# Patient Record
Sex: MALE | Race: BLACK OR AFRICAN AMERICAN | Employment: FULL TIME | URBAN - METROPOLITAN AREA
[De-identification: names, ages, dates, MRNs, and addresses within clinical notes are randomized per-mention and may not be internally consistent; named-entity substitution may affect disease eponyms.]

---

## 2018-05-29 ENCOUNTER — OFFICE VISIT (OUTPATIENT)
Dept: FAMILY MEDICINE CLINIC | Age: 48
End: 2018-05-29
Payer: MEDICARE

## 2018-05-29 VITALS
OXYGEN SATURATION: 98 % | TEMPERATURE: 98.2 F | SYSTOLIC BLOOD PRESSURE: 122 MMHG | HEART RATE: 78 BPM | HEIGHT: 70 IN | DIASTOLIC BLOOD PRESSURE: 80 MMHG | BODY MASS INDEX: 29.73 KG/M2 | WEIGHT: 207.7 LBS

## 2018-05-29 DIAGNOSIS — Z00.00 PREVENTATIVE HEALTH CARE: ICD-10-CM

## 2018-05-29 DIAGNOSIS — Z23 NEED FOR PROPHYLACTIC VACCINATION AND INOCULATION AGAINST VARICELLA: ICD-10-CM

## 2018-05-29 DIAGNOSIS — Z83.3 FAMILY HISTORY OF DIABETES MELLITUS: ICD-10-CM

## 2018-05-29 DIAGNOSIS — Z12.11 SCREENING FOR COLON CANCER: ICD-10-CM

## 2018-05-29 DIAGNOSIS — Z13.220 SCREENING FOR HYPERLIPIDEMIA: ICD-10-CM

## 2018-05-29 DIAGNOSIS — F31.9 BIPOLAR 1 DISORDER (HCC): Primary | ICD-10-CM

## 2018-05-29 DIAGNOSIS — Z87.891 FORMER SMOKER: ICD-10-CM

## 2018-05-29 DIAGNOSIS — I10 ESSENTIAL HYPERTENSION: ICD-10-CM

## 2018-05-29 DIAGNOSIS — Z23 NEED FOR PROPHYLACTIC VACCINATION AGAINST DIPHTHERIA-TETANUS-PERTUSSIS (DTP): ICD-10-CM

## 2018-05-29 PROCEDURE — 90715 TDAP VACCINE 7 YRS/> IM: CPT | Performed by: NURSE PRACTITIONER

## 2018-05-29 PROCEDURE — 99204 OFFICE O/P NEW MOD 45 MIN: CPT | Performed by: NURSE PRACTITIONER

## 2018-05-29 PROCEDURE — 90471 IMMUNIZATION ADMIN: CPT | Performed by: NURSE PRACTITIONER

## 2018-05-29 RX ORDER — LISINOPRIL 5 MG/1
5 TABLET ORAL DAILY
COMMUNITY
End: 2018-05-29 | Stop reason: SDUPTHER

## 2018-05-29 RX ORDER — HYDROCHLOROTHIAZIDE 12.5 MG/1
12.5 TABLET ORAL DAILY
COMMUNITY
End: 2018-05-29 | Stop reason: SDUPTHER

## 2018-05-29 RX ORDER — LISINOPRIL 5 MG/1
5 TABLET ORAL DAILY
Qty: 90 TABLET | Refills: 0 | Status: SHIPPED | OUTPATIENT
Start: 2018-05-29 | End: 2018-06-29 | Stop reason: SDUPTHER

## 2018-05-29 RX ORDER — LABETALOL 100 MG/1
100 TABLET, FILM COATED ORAL 2 TIMES DAILY
Qty: 180 TABLET | Refills: 0 | Status: SHIPPED | OUTPATIENT
Start: 2018-05-29 | End: 2018-06-29 | Stop reason: SDUPTHER

## 2018-05-29 RX ORDER — HYDROCHLOROTHIAZIDE 12.5 MG/1
12.5 TABLET ORAL DAILY
Qty: 90 TABLET | Refills: 0 | Status: SHIPPED | OUTPATIENT
Start: 2018-05-29 | End: 2018-06-29 | Stop reason: SDUPTHER

## 2018-05-29 RX ORDER — LABETALOL 100 MG/1
100 TABLET, FILM COATED ORAL 2 TIMES DAILY
COMMUNITY
End: 2018-05-29 | Stop reason: SDUPTHER

## 2018-05-29 ASSESSMENT — ENCOUNTER SYMPTOMS
ABDOMINAL PAIN: 0
SHORTNESS OF BREATH: 0
BLOOD IN STOOL: 0
COUGH: 0
EYE DISCHARGE: 0

## 2018-05-29 ASSESSMENT — PATIENT HEALTH QUESTIONNAIRE - PHQ9
SUM OF ALL RESPONSES TO PHQ9 QUESTIONS 1 & 2: 0
1. LITTLE INTEREST OR PLEASURE IN DOING THINGS: 0
SUM OF ALL RESPONSES TO PHQ QUESTIONS 1-9: 0
2. FEELING DOWN, DEPRESSED OR HOPELESS: 0

## 2018-06-04 ENCOUNTER — HOSPITAL ENCOUNTER (OUTPATIENT)
Age: 48
Setting detail: SPECIMEN
Discharge: HOME | End: 2018-06-04
Payer: MEDICARE

## 2018-06-04 DIAGNOSIS — Z83.3 FAMILY HISTORY OF DIABETES MELLITUS: ICD-10-CM

## 2018-06-04 DIAGNOSIS — Z00.00 PREVENTATIVE HEALTH CARE: ICD-10-CM

## 2018-06-04 LAB
ALBUMIN SERPL-MCNC: 4.2 G/DL (ref 3.5–5.2)
ALBUMIN/GLOBULIN RATIO: 1.4 (ref 1–2.5)
ALP BLD-CCNC: 55 U/L (ref 40–129)
ALT SERPL-CCNC: 16 U/L (ref 5–41)
ANION GAP SERPL CALCULATED.3IONS-SCNC: 9 MMOL/L (ref 9–17)
AST SERPL-CCNC: 22 U/L
BILIRUB SERPL-MCNC: 0.66 MG/DL (ref 0.3–1.2)
BUN BLDV-MCNC: 13 MG/DL (ref 6–20)
BUN/CREAT BLD: ABNORMAL (ref 9–20)
CALCIUM SERPL-MCNC: 8.8 MG/DL (ref 8.6–10.4)
CHLORIDE BLD-SCNC: 103 MMOL/L (ref 98–107)
CHOLESTEROL/HDL RATIO: 3
CHOLESTEROL: 190 MG/DL
CO2: 30 MMOL/L (ref 20–31)
CREAT SERPL-MCNC: 1.08 MG/DL (ref 0.7–1.2)
ESTIMATED AVERAGE GLUCOSE: 108 MG/DL
GFR AFRICAN AMERICAN: >60 ML/MIN
GFR NON-AFRICAN AMERICAN: >60 ML/MIN
GFR SERPL CREATININE-BSD FRML MDRD: ABNORMAL ML/MIN/{1.73_M2}
GFR SERPL CREATININE-BSD FRML MDRD: ABNORMAL ML/MIN/{1.73_M2}
GLUCOSE BLD-MCNC: 101 MG/DL (ref 70–99)
HBA1C MFR BLD: 5.4 % (ref 4–6)
HCT VFR BLD CALC: 42.5 % (ref 40.7–50.3)
HDLC SERPL-MCNC: 63 MG/DL
HEMOGLOBIN: 13.8 G/DL (ref 13–17)
LDL CHOLESTEROL: 116 MG/DL (ref 0–130)
MCH RBC QN AUTO: 27.8 PG (ref 25.2–33.5)
MCHC RBC AUTO-ENTMCNC: 32.5 G/DL (ref 28.4–34.8)
MCV RBC AUTO: 85.7 FL (ref 82.6–102.9)
NRBC AUTOMATED: 0 PER 100 WBC
PDW BLD-RTO: 13.1 % (ref 11.8–14.4)
PLATELET # BLD: 187 K/UL (ref 138–453)
PMV BLD AUTO: 10 FL (ref 8.1–13.5)
POTASSIUM SERPL-SCNC: 4.3 MMOL/L (ref 3.7–5.3)
RBC # BLD: 4.96 M/UL (ref 4.21–5.77)
SODIUM BLD-SCNC: 142 MMOL/L (ref 135–144)
TOTAL PROTEIN: 7.2 G/DL (ref 6.4–8.3)
TRIGL SERPL-MCNC: 54 MG/DL
VLDLC SERPL CALC-MCNC: NORMAL MG/DL (ref 1–30)
WBC # BLD: 3.6 K/UL (ref 3.5–11.3)

## 2018-06-29 ENCOUNTER — OFFICE VISIT (OUTPATIENT)
Dept: FAMILY MEDICINE CLINIC | Age: 48
End: 2018-06-29
Payer: MEDICARE

## 2018-06-29 VITALS
DIASTOLIC BLOOD PRESSURE: 74 MMHG | BODY MASS INDEX: 30.12 KG/M2 | TEMPERATURE: 97.9 F | OXYGEN SATURATION: 97 % | SYSTOLIC BLOOD PRESSURE: 120 MMHG | HEART RATE: 70 BPM | HEIGHT: 70 IN | WEIGHT: 210.4 LBS

## 2018-06-29 DIAGNOSIS — Z12.11 SCREENING FOR COLON CANCER: ICD-10-CM

## 2018-06-29 DIAGNOSIS — Z23 NEED FOR PROPHYLACTIC VACCINATION AND INOCULATION AGAINST VARICELLA: ICD-10-CM

## 2018-06-29 DIAGNOSIS — F31.9 BIPOLAR 1 DISORDER (HCC): ICD-10-CM

## 2018-06-29 DIAGNOSIS — Z83.3 FAMILY HISTORY OF DIABETES MELLITUS: ICD-10-CM

## 2018-06-29 DIAGNOSIS — I10 ESSENTIAL HYPERTENSION: Primary | ICD-10-CM

## 2018-06-29 PROCEDURE — G8427 DOCREV CUR MEDS BY ELIG CLIN: HCPCS | Performed by: NURSE PRACTITIONER

## 2018-06-29 PROCEDURE — 3017F COLORECTAL CA SCREEN DOC REV: CPT | Performed by: NURSE PRACTITIONER

## 2018-06-29 PROCEDURE — 1036F TOBACCO NON-USER: CPT | Performed by: NURSE PRACTITIONER

## 2018-06-29 PROCEDURE — 99214 OFFICE O/P EST MOD 30 MIN: CPT | Performed by: NURSE PRACTITIONER

## 2018-06-29 PROCEDURE — G8419 CALC BMI OUT NRM PARAM NOF/U: HCPCS | Performed by: NURSE PRACTITIONER

## 2018-06-29 RX ORDER — ASPIRIN 81 MG/1
TABLET, DELAYED RELEASE ORAL
Refills: 0 | COMMUNITY
Start: 2018-06-01 | End: 2018-06-29

## 2018-06-29 ASSESSMENT — ENCOUNTER SYMPTOMS
EYE DISCHARGE: 0
COUGH: 0
BLOOD IN STOOL: 0
ABDOMINAL PAIN: 0
SHORTNESS OF BREATH: 0

## 2018-07-02 RX ORDER — LISINOPRIL 5 MG/1
TABLET ORAL
Qty: 90 TABLET | Refills: 0 | Status: SHIPPED | OUTPATIENT
Start: 2018-07-02 | End: 2018-12-01 | Stop reason: SDUPTHER

## 2018-07-02 RX ORDER — ASPIRIN 81 MG/1
TABLET ORAL
Qty: 60 TABLET | Refills: 0 | Status: SHIPPED | OUTPATIENT
Start: 2018-07-02 | End: 2018-09-06 | Stop reason: SDUPTHER

## 2018-07-02 RX ORDER — LABETALOL 100 MG/1
TABLET, FILM COATED ORAL
Qty: 180 TABLET | Refills: 0 | Status: SHIPPED | OUTPATIENT
Start: 2018-07-02 | End: 2018-11-26 | Stop reason: SDUPTHER

## 2018-07-02 RX ORDER — HYDROCHLOROTHIAZIDE 12.5 MG/1
TABLET ORAL
Qty: 90 TABLET | Refills: 0 | Status: SHIPPED | OUTPATIENT
Start: 2018-07-02 | End: 2018-12-01 | Stop reason: SDUPTHER

## 2018-08-27 ENCOUNTER — HOSPITAL ENCOUNTER (EMERGENCY)
Age: 48
Discharge: HOME | End: 2018-08-27
Attending: EMERGENCY MEDICINE
Payer: MEDICARE

## 2018-08-27 VITALS
TEMPERATURE: 98.9 F | OXYGEN SATURATION: 99 % | HEART RATE: 60 BPM | HEIGHT: 70 IN | SYSTOLIC BLOOD PRESSURE: 145 MMHG | BODY MASS INDEX: 29.35 KG/M2 | WEIGHT: 205 LBS | DIASTOLIC BLOOD PRESSURE: 81 MMHG | RESPIRATION RATE: 18 BRPM

## 2018-08-27 DIAGNOSIS — L02.419 AXILLARY ABSCESS: Primary | ICD-10-CM

## 2018-08-27 PROCEDURE — 99282 EMERGENCY DEPT VISIT SF MDM: CPT

## 2018-08-27 PROCEDURE — 10060 I&D ABSCESS SIMPLE/SINGLE: CPT

## 2018-08-27 PROCEDURE — 6370000000 HC RX 637 (ALT 250 FOR IP): Performed by: EMERGENCY MEDICINE

## 2018-08-27 RX ORDER — CLINDAMYCIN HYDROCHLORIDE 300 MG/1
300 CAPSULE ORAL 4 TIMES DAILY
Qty: 28 CAPSULE | Refills: 0 | Status: SHIPPED | OUTPATIENT
Start: 2018-08-27 | End: 2018-09-03

## 2018-08-27 RX ORDER — CLINDAMYCIN HYDROCHLORIDE 150 MG/1
300 CAPSULE ORAL ONCE
Status: COMPLETED | OUTPATIENT
Start: 2018-08-27 | End: 2018-08-27

## 2018-08-27 RX ORDER — LIDOCAINE HYDROCHLORIDE AND EPINEPHRINE 10; 10 MG/ML; UG/ML
20 INJECTION, SOLUTION INFILTRATION; PERINEURAL ONCE
Status: DISCONTINUED | OUTPATIENT
Start: 2018-08-27 | End: 2018-08-27 | Stop reason: HOSPADM

## 2018-08-27 RX ADMIN — CLINDAMYCIN HYDROCHLORIDE 300 MG: 150 CAPSULE ORAL at 08:41

## 2018-08-27 ASSESSMENT — ENCOUNTER SYMPTOMS
COLOR CHANGE: 1
ABDOMINAL PAIN: 0
VOMITING: 0
NAUSEA: 0
SHORTNESS OF BREATH: 0

## 2018-08-27 ASSESSMENT — PAIN SCALES - GENERAL
PAINLEVEL_OUTOF10: 3
PAINLEVEL_OUTOF10: 8

## 2018-08-27 ASSESSMENT — PAIN DESCRIPTION - ORIENTATION: ORIENTATION: RIGHT

## 2018-08-27 ASSESSMENT — PAIN DESCRIPTION - LOCATION: LOCATION: ARM

## 2018-08-27 NOTE — ED PROVIDER NOTES
reviewed. DIFFERENTIAL DIAGNOSIS/MDM:   53-year-old male presents with an abscess to the right axilla. Mild overlying erythema. Will need drainage. He is afebrile and nontoxic. Vitals and within normal limits. No complaints otherwise. With the overlying erythema and the location of the abscess will start patient on clindamycin. Patient was advised to return to his PCPs office. Also will give patient follow-up information to [facility] surgery clinic as this may be a recurring issue. Advised to return if he develops a fever or any other symptoms. Patient's agreeable with plan will be discharged home today. DIAGNOSTIC RESULTS     EKG: All EKG's are interpreted by the Emergency Department Physician who either signs or Co-signs this chart in the absence of a cardiologist.        RADIOLOGY:   I directly visualized the following  images and reviewed the radiologist interpretations:  No orders to display           ED BEDSIDE ULTRASOUND:      LABS:  Labs Reviewed - No data to display      EMERGENCY DEPARTMENT COURSE:   Vitals:    Vitals:    08/27/18 0826   BP: (!) 145/81   Pulse: 60   Resp: 18   Temp: 98.9 °F (37.2 °C)   TempSrc: Oral   SpO2: 99%   Weight: 205 lb (93 kg)   Height: 5' 10\" (1.778 m)              CRITICAL CARE:      CONSULTS:  None      PROCEDURES:  Incision and Drainage Procedure Note    Indication: axillary abscess    Procedure: The patient was positioned appropriately and the skin over the incision site was prepped with alcohol. Local anesthesia was obtained by infiltration using 1% Lidocaine with epinephrine. An incision was then made over the apex of the lesion and approximately 4 cc of thick, purulent and bloody material was expressed. Loculations were not present. The drainage cavity was then irrigated. The patients tetanus status was updated with a tetanus booster. The patient tolerated the procedure well. Complications: None          FINAL IMPRESSION      1.  Axillary abscess

## 2018-09-06 ENCOUNTER — OFFICE VISIT (OUTPATIENT)
Dept: FAMILY MEDICINE CLINIC | Age: 48
End: 2018-09-06
Payer: COMMERCIAL

## 2018-09-06 VITALS
OXYGEN SATURATION: 98 % | HEIGHT: 70 IN | DIASTOLIC BLOOD PRESSURE: 71 MMHG | HEART RATE: 71 BPM | SYSTOLIC BLOOD PRESSURE: 112 MMHG | BODY MASS INDEX: 29.32 KG/M2 | WEIGHT: 204.8 LBS

## 2018-09-06 DIAGNOSIS — Z23 ENCOUNTER FOR IMMUNIZATION: ICD-10-CM

## 2018-09-06 DIAGNOSIS — Z12.11 SCREENING FOR COLON CANCER: ICD-10-CM

## 2018-09-06 DIAGNOSIS — L02.419 AXILLARY ABSCESS: Primary | ICD-10-CM

## 2018-09-06 DIAGNOSIS — Z23 NEED FOR PROPHYLACTIC VACCINATION AND INOCULATION AGAINST VARICELLA: ICD-10-CM

## 2018-09-06 DIAGNOSIS — Z00.00 PREVENTATIVE HEALTH CARE: ICD-10-CM

## 2018-09-06 DIAGNOSIS — I10 ESSENTIAL HYPERTENSION: ICD-10-CM

## 2018-09-06 PROCEDURE — 99214 OFFICE O/P EST MOD 30 MIN: CPT | Performed by: NURSE PRACTITIONER

## 2018-09-06 RX ORDER — ARIPIPRAZOLE 10 MG/1
10 TABLET ORAL DAILY
COMMUNITY
End: 2019-02-18 | Stop reason: CLARIF

## 2018-09-06 RX ORDER — ASPIRIN 81 MG/1
TABLET ORAL
Qty: 90 TABLET | Refills: 1 | Status: SHIPPED | OUTPATIENT
Start: 2018-09-06 | End: 2021-03-01 | Stop reason: SDUPTHER

## 2018-09-06 ASSESSMENT — ENCOUNTER SYMPTOMS
SHORTNESS OF BREATH: 0
ABDOMINAL PAIN: 0
EYE DISCHARGE: 0
COUGH: 0
BLOOD IN STOOL: 0

## 2018-09-06 ASSESSMENT — PATIENT HEALTH QUESTIONNAIRE - PHQ9
SUM OF ALL RESPONSES TO PHQ QUESTIONS 1-9: 0
2. FEELING DOWN, DEPRESSED OR HOPELESS: 0
1. LITTLE INTEREST OR PLEASURE IN DOING THINGS: 0
SUM OF ALL RESPONSES TO PHQ QUESTIONS 1-9: 0
SUM OF ALL RESPONSES TO PHQ9 QUESTIONS 1 & 2: 0

## 2018-09-06 NOTE — PROGRESS NOTES
Visit Information    Have you changed or started any medications since your last visit including any over-the-counter medicines, vitamins, or herbal medicines? no   Are you having any side effects from any of your medications? -  no  Have you stopped taking any of your medications? Is so, why? -  no    Have you seen any other physician or provider since your last visit? No  Have you had any other diagnostic tests since your last visit? No  Have you been seen in the emergency room and/or had an admission to a hospital since we last saw you? Yes - Records Obtained  Have you had your routine dental cleaning in the past 6 months? yes -     Have you activated your [employer] account? If not, what are your barriers?  Yes     Patient Care Team:  HARRY Acosta - CNP as PCP - General (Certified Nurse Practitioner)    Medical History Review  Past Medical, Family, and Social History reviewed and does contribute to the patient presenting condition    Health Maintenance   Topic Date Due    HIV screen  03/21/1983    Shingles Vaccine (1 of 2 - 2 Dose Series) 03/21/2018    Colon cancer screen colonoscopy  03/21/2018    Flu vaccine (1) 09/01/2018    Potassium monitoring  06/04/2019    Creatinine monitoring  06/04/2019    Diabetes screen  06/04/2021    Lipid screen  06/04/2023    DTaP/Tdap/Td vaccine (2 - Td) 05/29/2028

## 2018-09-06 NOTE — PROGRESS NOTES
[address] [facility] [address] [facility]  [address] [address] [facility] [address]  [address] [facility] [address]  Dept: [phone]  Dept Fax: [phone]    Mag Earl is a 46 y.o. male who presents today for his medical conditions/complaints as noted below. Mag Earl is c/o of Follow-Up from Hospital (axillary abcess)        HPI:     Patient presents with: Follow-Up from Hospital: axillary abscess      Given clindamycin, took and feels it is clearing up    Recent cyst x 2, within 5 mos. Last 2011  Got them in groin when in [employer]. Was referred to:  [address] [address] [phone]  Did not make appt. Pt is worried this is assoc with sexual activity. Seeing zepf on abilify and zoloft         Past Medical History:   Diagnosis Date    Bipolar 1 disorder ([address]     Depression     prev on zoloft , abilify     Hypertension       Past Surgical History:   Procedure Laterality Date    BICEPS TENDON REPAIR Left        Family History   Problem Relation Age of Onset    Hypertension Mother     Heart Disease Mother     Diabetes Mother     High Cholesterol Mother     Cancer Father         leukemia    Hypertension Sister         Sisters are twins    Hypertension Brother     Heart Disease Brother        Social History   Substance Use Topics    Smoking status: Former Smoker     Packs/day: 0.50     Years: 15.00     Types: Cigarettes    Smokeless tobacco: Never Used      Comment: quit 2015    Alcohol use No      Comment: none since 2010      Current Outpatient Prescriptions   Medication Sig Dispense Refill    zoster recombinant adjuvanted vaccine (SHINGRIX) 50 MCG SUSR injection Inject 0.5 mLs into the muscle once for 1 dose 50 MCG IM then repeat 2-6 months.  1 each 1    aspirin 81 MG EC tablet TAKE 1 TABLET BY MOUTH ONE TIME A DAY 90 tablet 1    ARIPiprazole (ABILIFY) 10 MG tablet Take 10 mg by mouth daily      sertraline (ZOLOFT) 50 MG tablet Take 50 mg by mouth daily      hydrochlorothiazide (HYDRODIURIL) 12.5 MG tablet TAKE 1 TABLET BY MOUTH ONE TIME A DAY 90 tablet 0    labetalol (NORMODYNE) 100 MG tablet TAKE 1 TABLET BY MOUTH TWO TIMES A  tablet 0    lisinopril (PRINIVIL;ZESTRIL) 5 MG tablet TAKE 1 TABLET BY MOUTH ONE TIME A DAY 90 tablet 0     No current facility-administered medications for this visit. Allergies   Allergen Reactions    Nsaids     Pcn [Penicillins] Swelling     Swelling,hives, itching and shortness of breath         Subjective:      Review of Systems   Constitutional: Negative for activity change, chills, fatigue and fever. Eyes: Negative for discharge and visual disturbance. Respiratory: Negative for cough and shortness of breath. Cardiovascular: Negative for chest pain and leg swelling. Gastrointestinal: Negative for abdominal pain and blood in stool. Endocrine: Negative for cold intolerance and heat intolerance. Genitourinary: Negative for dysuria and flank pain. Musculoskeletal: Negative for joint swelling and myalgias. Skin: Negative for pallor and rash. Small lump in R axilla, not TTP   Neurological: Negative for dizziness and headaches. Psychiatric/Behavioral: Negative for hallucinations and suicidal ideas. Objective:     Physical Exam   Constitutional: He is oriented to person, place, and time. He appears well-developed and well-nourished. /71   Pulse 71   Ht 5' 10\" (1.778 m)   Wt 204 lb 12.8 oz (92.9 kg)   SpO2 98% Comment: resting @ RA  BMI 29.39 kg/m²      HENT:   Head: Normocephalic and atraumatic. Eyes: Conjunctivae and EOM are normal. No scleral icterus. Neck: Neck supple. Cardiovascular: Normal rate, regular rhythm, normal heart sounds and intact distal pulses. Pulmonary/Chest: Effort normal and breath sounds normal. He has no wheezes. He has no rales. Abdominal: Soft.  Bowel sounds are normal.   Musculoskeletal: Normal range of motion. He exhibits no edema. Neurological: He is alert and oriented to person, place, and time. Skin: Skin is warm and dry. Psychiatric: He has a normal mood and affect. Nursing note and vitals reviewed. /71   Pulse 71   Ht 5' 10\" (1.778 m)   Wt 204 lb 12.8 oz (92.9 kg)   SpO2 98% Comment: resting @ RA  BMI 29.39 kg/m²     CBC:   Lab Results   Component Value Date    WBC 3.6 06/04/2018    RBC 4.96 06/04/2018    HGB 13.8 06/04/2018    HCT 42.5 06/04/2018    MCV 85.7 06/04/2018    MCH 27.8 06/04/2018    MCHC 32.5 06/04/2018    RDW 13.1 06/04/2018     06/04/2018    MPV 10.0 06/04/2018     CMP:    Lab Results   Component Value Date     06/04/2018    K 4.3 06/04/2018     06/04/2018    CO2 30 06/04/2018    BUN 13 06/04/2018    CREATININE 1.08 06/04/2018    GFRAA >60 06/04/2018    LABGLOM >60 06/04/2018    GLUCOSE 101 06/04/2018    PROT 7.2 06/04/2018    LABALBU 4.2 06/04/2018    CALCIUM 8.8 06/04/2018    BILITOT 0.66 06/04/2018    ALKPHOS 55 06/04/2018    AST 22 06/04/2018    ALT 16 06/04/2018     Lab Results   Component Value Date    LABA1C 5.4 06/04/2018           Assessment:       Diagnosis Orders   1. Axillary abscess     2. Essential hypertension  aspirin 81 MG EC tablet   3. Body mass index (BMI) of 25.0 to 25.9 in adult     4. Need for prophylactic vaccination and inoculation against varicella  zoster recombinant adjuvanted vaccine (SHINGRIX) 50 MCG SUSR injection   5. Screening for colon cancer  POCT FECAL IMMUNOCHEMICAL TEST (FIT)   6. Preventative health care  HIV-1 And HIV-2 Antibodies    INFLUENZA, QUADV, 6-35 MO, IM, MDV, 0.25ML (FLUZONE QUADV)   7. Encounter for immunization   INFLUENZA, QUADV, 6-35 MO, IM, MDV, 0.25ML ([facility])       Plan:       Diagnosis Orders   1. Axillary abscess  Improved with clindamycin, watns to [facility] derm if recurs. advised not sex activity related and pt is relieved. 2. Essential hypertension  aspirin 81 MG EC tablet. Stable. Continue  current therapy, on acei , renal chem good      3. Body mass index (BMI) of 25.0 to 25.9 in adult  The patient is asked to make an attempt to improve diet and exercise patterns to aid in medical management of this problem. 4. Need for prophylactic vaccination and inoculation against varicella  zoster recombinant adjuvanted vaccine (SHINGRIX) 50 MCG SUSR injection   5. Screening for colon cancer  POCT FECAL IMMUNOCHEMICAL TEST (FIT)   6. Preventative health care  HIV-1 And HIV-2 Antibodies    INFLUENZA, QUADV, 6-35 MO, IM, MDV, 0.25ML ([facility])   7. Encounter for immunization   INFLUENZA, Deri Bolster, 6-35 MO, IM, MDV, 0.25ML ([facility])       Michael Aguero received counseling on the following healthy behaviors: medication adherence  Reviewed prior labs and health maintenance  Continue current medications, diet and exercise. Discussed use, benefit, and side effects of prescribed medications. Barriers to medication compliance addressed. Patient given educational materials - see patient instructions  Was a self-tracking handout given in paper form or via [email or web address]? Yes    Requested Prescriptions     Signed Prescriptions Disp Refills    zoster recombinant adjuvanted vaccine (SHINGRIX) 50 MCG SUSR injection 1 each 1     Sig: Inject 0.5 mLs into the muscle once for 1 dose 50 MCG IM then repeat 2-6 months.  aspirin 81 MG EC tablet 90 tablet 1     Sig: TAKE 1 TABLET BY MOUTH ONE TIME A DAY       All patient questions answered. Patient voiced understanding. Quality Measures    Body mass index is 29.39 kg/m². Elevated. Weight control planned discussed Healthy diet and regular exercise. BP: 112/71 Blood pressure is normal. Treatment plan consists of No treatment change needed.     Lab Results   Component Value Date    LDLCHOLESTEROL 116 06/04/2018    (goal LDL reduction with dx if diabetes is 50% LDL reduction)      PHQ Scores 9/6/2018 5/29/2018   PHQ2 Score 0 0   PHQ9 Score 0 0     Interpretation of Total Score Depression Severity: 1-4 = Minimal depression, 5-9 = Mild depression, 10-14 = Moderate depression, 15-19 = Moderately severe depression, 20-27 = Severe depression    Return in about 3 months (around 12/6/2018) for HTN. Orders Placed This Encounter   Procedures    INFLUENZA, QUADV, 6-35 MO, IM, MDV, 0.25ML (FLUZONE QUADV)    HIV-1 And HIV-2 Antibodies     Standing Status:   Future     Standing Expiration Date:   9/6/2019    POCT FECAL IMMUNOCHEMICAL TEST (FIT)     Standing Status:   Future     Standing Expiration Date:   9/6/2019     Orders Placed This Encounter   Medications    zoster recombinant adjuvanted vaccine (SHINGRIX) 50 MCG SUSR injection     Sig: Inject 0.5 mLs into the muscle once for 1 dose 50 MCG IM then repeat 2-6 months. Dispense:  1 each     Refill:  1    aspirin 81 MG EC tablet     Sig: TAKE 1 TABLET BY MOUTH ONE TIME A DAY     Dispense:  90 tablet     Refill:  1       Patient given verbal and/or written educational instructions. Follow up as directed. I have reviewed and agree with the nursing documentation.     Electronically signed by HARRY Triplett CNP on 9/6/2018 at 9:02 AM

## 2018-09-07 ENCOUNTER — HOSPITAL ENCOUNTER (OUTPATIENT)
Age: 48
Discharge: HOME | End: 2018-09-07
Payer: COMMERCIAL

## 2018-09-07 DIAGNOSIS — Z00.00 PREVENTATIVE HEALTH CARE: ICD-10-CM

## 2018-09-07 DIAGNOSIS — Z12.11 SCREENING FOR COLON CANCER: ICD-10-CM

## 2018-09-07 LAB
ABSOLUTE BANDS #: 0.03 K/UL (ref 0–1)
ABSOLUTE EOS #: 0.06 K/UL (ref 0–0.4)
ABSOLUTE IMMATURE GRANULOCYTE: ABNORMAL K/UL (ref 0–0.3)
ABSOLUTE LYMPH #: 0.83 K/UL (ref 1–4.8)
ABSOLUTE MONO #: 0.29 K/UL (ref 0.1–1.3)
ALBUMIN SERPL-MCNC: 4.3 G/DL (ref 3.5–5.2)
ALBUMIN/GLOBULIN RATIO: ABNORMAL (ref 1–2.5)
ALP BLD-CCNC: 55 U/L (ref 40–129)
ALT SERPL-CCNC: 12 U/L (ref 5–41)
ANION GAP SERPL CALCULATED.3IONS-SCNC: 10 MMOL/L (ref 9–17)
AST SERPL-CCNC: 20 U/L
ATYPICAL LYMPHOCYTE ABSOLUTE COUNT: 0.06 K/UL
ATYPICAL LYMPHOCYTES: 2 %
BANDS: 1 % (ref 0–10)
BASOPHILS # BLD: 0 % (ref 0–2)
BASOPHILS ABSOLUTE: 0 K/UL (ref 0–0.2)
BILIRUB SERPL-MCNC: 0.51 MG/DL (ref 0.3–1.2)
BUN BLDV-MCNC: 14 MG/DL (ref 6–20)
BUN/CREAT BLD: ABNORMAL (ref 9–20)
CALCIUM SERPL-MCNC: 9 MG/DL (ref 8.6–10.4)
CHLORIDE BLD-SCNC: 101 MMOL/L (ref 98–107)
CHOLESTEROL/HDL RATIO: 3.4
CHOLESTEROL: 185 MG/DL
CO2: 28 MMOL/L (ref 20–31)
CONTROL: PRESENT
CREAT SERPL-MCNC: 1.31 MG/DL (ref 0.7–1.2)
DIFFERENTIAL TYPE: ABNORMAL
EOSINOPHILS RELATIVE PERCENT: 2 % (ref 0–4)
GFR AFRICAN AMERICAN: >60 ML/MIN
GFR NON-AFRICAN AMERICAN: 58 ML/MIN
GFR SERPL CREATININE-BSD FRML MDRD: ABNORMAL ML/MIN/{1.73_M2}
GFR SERPL CREATININE-BSD FRML MDRD: ABNORMAL ML/MIN/{1.73_M2}
GLUCOSE BLD-MCNC: 103 MG/DL (ref 70–99)
HCT VFR BLD CALC: 41.5 % (ref 41–53)
HDLC SERPL-MCNC: 55 MG/DL
HEMOCCULT STL QL: NEGATIVE
HEMOGLOBIN: 14.3 G/DL (ref 13.5–17.5)
HIV AG/AB: NONREACTIVE
IMMATURE GRANULOCYTES: ABNORMAL %
LDL CHOLESTEROL: 118 MG/DL (ref 0–130)
LYMPHOCYTES # BLD: 26 % (ref 24–44)
MCH RBC QN AUTO: 29 PG (ref 26–34)
MCHC RBC AUTO-ENTMCNC: 34.5 G/DL (ref 31–37)
MCV RBC AUTO: 84.1 FL (ref 80–100)
MONOCYTES # BLD: 9 % (ref 1–7)
MORPHOLOGY: NORMAL
NRBC AUTOMATED: ABNORMAL PER 100 WBC
PDW BLD-RTO: 13.3 % (ref 11.5–14.9)
PLATELET # BLD: 219 K/UL (ref 150–450)
PLATELET ESTIMATE: ABNORMAL
PMV BLD AUTO: 7 FL (ref 6–12)
POTASSIUM SERPL-SCNC: 3.8 MMOL/L (ref 3.7–5.3)
RBC # BLD: 4.94 M/UL (ref 4.5–5.9)
RBC # BLD: ABNORMAL 10*6/UL
SEG NEUTROPHILS: 60 % (ref 36–66)
SEGMENTED NEUTROPHILS ABSOLUTE COUNT: 1.93 K/UL (ref 1.3–9.1)
SODIUM BLD-SCNC: 139 MMOL/L (ref 135–144)
T3 TOTAL: 118 NG/DL (ref 80–200)
T4 TOTAL: 7.7 UG/DL (ref 4.5–12)
TOTAL PROTEIN: 7.2 G/DL (ref 6.4–8.3)
TRIGL SERPL-MCNC: 62 MG/DL
TSH SERPL DL<=0.05 MIU/L-ACNC: 1.2 MIU/L (ref 0.3–5)
VLDLC SERPL CALC-MCNC: NORMAL MG/DL (ref 1–30)
WBC # BLD: 3.2 K/UL (ref 3.5–11)
WBC # BLD: ABNORMAL 10*3/UL

## 2018-09-07 PROCEDURE — 82274 ASSAY TEST FOR BLOOD FECAL: CPT | Performed by: NURSE PRACTITIONER

## 2018-09-07 PROCEDURE — 87389 HIV-1 AG W/HIV-1&-2 AB AG IA: CPT

## 2018-09-07 PROCEDURE — 84443 ASSAY THYROID STIM HORMONE: CPT

## 2018-09-07 PROCEDURE — 84436 ASSAY OF TOTAL THYROXINE: CPT

## 2018-09-07 PROCEDURE — 80061 LIPID PANEL: CPT

## 2018-09-07 PROCEDURE — 84480 ASSAY TRIIODOTHYRONINE (T3): CPT

## 2018-09-07 PROCEDURE — 85025 COMPLETE CBC W/AUTO DIFF WBC: CPT

## 2018-09-07 PROCEDURE — 80053 COMPREHEN METABOLIC PANEL: CPT

## 2018-09-07 PROCEDURE — 36415 COLL VENOUS BLD VENIPUNCTURE: CPT

## 2018-09-14 PROCEDURE — G0008 ADMIN INFLUENZA VIRUS VAC: HCPCS | Performed by: NURSE PRACTITIONER

## 2018-09-14 PROCEDURE — 90686 IIV4 VACC NO PRSV 0.5 ML IM: CPT | Performed by: NURSE PRACTITIONER

## 2018-11-27 PROBLEM — N28.9 RENAL INSUFFICIENCY: Status: ACTIVE | Noted: 2018-11-27

## 2018-12-03 DIAGNOSIS — N28.9 RENAL INSUFFICIENCY: Primary | ICD-10-CM

## 2018-12-03 RX ORDER — HYDROCHLOROTHIAZIDE 12.5 MG/1
TABLET ORAL
Qty: 90 TABLET | Refills: 0 | Status: SHIPPED | OUTPATIENT
Start: 2018-12-03 | End: 2018-12-06 | Stop reason: SDUPTHER

## 2018-12-03 RX ORDER — LISINOPRIL 5 MG/1
TABLET ORAL
Qty: 90 TABLET | Refills: 0 | Status: SHIPPED | OUTPATIENT
Start: 2018-12-03 | End: 2018-12-06 | Stop reason: SDUPTHER

## 2018-12-06 ENCOUNTER — OFFICE VISIT (OUTPATIENT)
Dept: FAMILY MEDICINE CLINIC | Age: 48
End: 2018-12-06
Payer: MEDICARE

## 2018-12-06 VITALS
HEIGHT: 70 IN | DIASTOLIC BLOOD PRESSURE: 76 MMHG | SYSTOLIC BLOOD PRESSURE: 118 MMHG | WEIGHT: 211.6 LBS | HEART RATE: 72 BPM | OXYGEN SATURATION: 99 % | BODY MASS INDEX: 30.29 KG/M2

## 2018-12-06 DIAGNOSIS — F31.9 BIPOLAR 1 DISORDER (HCC): ICD-10-CM

## 2018-12-06 DIAGNOSIS — N28.9 RENAL INSUFFICIENCY: ICD-10-CM

## 2018-12-06 DIAGNOSIS — N52.9 ERECTILE DYSFUNCTION, UNSPECIFIED ERECTILE DYSFUNCTION TYPE: ICD-10-CM

## 2018-12-06 DIAGNOSIS — D72.819 LEUKOPENIA, UNSPECIFIED TYPE: ICD-10-CM

## 2018-12-06 DIAGNOSIS — I10 ESSENTIAL HYPERTENSION: Primary | ICD-10-CM

## 2018-12-06 PROCEDURE — 99214 OFFICE O/P EST MOD 30 MIN: CPT | Performed by: NURSE PRACTITIONER

## 2018-12-06 RX ORDER — LISINOPRIL 5 MG/1
TABLET ORAL
Qty: 90 TABLET | Refills: 0 | Status: SHIPPED | OUTPATIENT
Start: 2018-12-06 | End: 2019-02-18 | Stop reason: SDUPTHER

## 2018-12-06 RX ORDER — CARVEDILOL 6.25 MG/1
6.25 TABLET ORAL 2 TIMES DAILY
Qty: 60 TABLET | Refills: 3 | Status: SHIPPED | OUTPATIENT
Start: 2018-12-06 | End: 2019-02-18 | Stop reason: ALTCHOICE

## 2018-12-06 RX ORDER — HYDROCHLOROTHIAZIDE 12.5 MG/1
TABLET ORAL
Qty: 90 TABLET | Refills: 0 | Status: SHIPPED | OUTPATIENT
Start: 2018-12-06 | End: 2019-02-18 | Stop reason: SDUPTHER

## 2018-12-06 ASSESSMENT — ENCOUNTER SYMPTOMS
COUGH: 0
EYE DISCHARGE: 0
BLOOD IN STOOL: 0
ABDOMINAL PAIN: 0
SHORTNESS OF BREATH: 0

## 2018-12-06 NOTE — PROGRESS NOTES
[address] [facility] [address] [facility]  [address]  [address] [facility] [address]  Dept: [phone]  Dept Fax: [phone]    Honey Garcia is a 46 y.o. male who presents today for his medical conditions/complaintsas noted below. Honey Garcia is c/o of Hypertension and Erectile Dysfunction (thinks the medication have caused issues )        HPI:     Patient presents with:  Hypertension; Worried about meds and ED   Taking compliantly       Erectile Dysfunction: thinks the medication have caused issues   Achieving erection , not maintaining , not ejaculating. Started labetalol in 2012 ; had no issues back then , but was in long term for 6 y and not having sex.     Started few mths ago - since April   Started abilify and zoloft in sept - noticed no appreciable difference when started these             Past Medical History:   Diagnosis Date    B[address] disorder ([facility] [address]     Depression     prev on zoloft , abilify     Hypertension     Renal insufficiency 11/27/2018     Past Surgical History:   Procedure Laterality Date    BICEPS TENDON REPAIR Left        Family History   Problem Relation Age of Onset    Hypertension Mother     Heart Disease Mother     Diabetes Mother     High Cholesterol Mother     Cancer Father         leukemia    Hypertension Sister         Sisters are twins    Hypertension Brother     Heart Disease Brother        Social History   Substance Use Topics    Smoking status: Former Smoker     Packs/day: 0.50     Years: 15.00     Types: Cigarettes    Smokeless tobacco: Never Used      Comment: quit 2015    Alcohol use No      Comment: none since 2010      Current Outpatient Prescriptions   Medication Sig Dispense Refill    hydrochlorothiazide (HYDRODIURIL) 12.5 MG tablet TAKE 1 TABLET BY MOUTH ONE TIME A DAY 90 tablet 0    lisinopril (PRINIVIL;ZESTRIL) 5 MG tablet TAKE 1 TABLET BY MOUTH ONE TIME A DAY 90 tablet 0    carvedilol (COREG) 6.25 MG

## 2018-12-31 ENCOUNTER — TELEPHONE (OUTPATIENT)
Dept: FAMILY MEDICINE CLINIC | Age: 48
End: 2018-12-31

## 2018-12-31 ENCOUNTER — HOSPITAL ENCOUNTER (OUTPATIENT)
Age: 48
Discharge: HOME | End: 2018-12-31
Payer: COMMERCIAL

## 2018-12-31 DIAGNOSIS — D72.819 LEUKOPENIA, UNSPECIFIED TYPE: Primary | ICD-10-CM

## 2018-12-31 DIAGNOSIS — N28.9 RENAL INSUFFICIENCY: ICD-10-CM

## 2018-12-31 DIAGNOSIS — D72.819 LEUKOPENIA, UNSPECIFIED TYPE: ICD-10-CM

## 2018-12-31 LAB
ANION GAP SERPL CALCULATED.3IONS-SCNC: 8 MMOL/L (ref 9–17)
BUN BLDV-MCNC: 13 MG/DL (ref 6–20)
BUN/CREAT BLD: ABNORMAL (ref 9–20)
CALCIUM SERPL-MCNC: 9.1 MG/DL (ref 8.6–10.4)
CHLORIDE BLD-SCNC: 104 MMOL/L (ref 98–107)
CO2: 28 MMOL/L (ref 20–31)
CREAT SERPL-MCNC: 1.21 MG/DL (ref 0.7–1.2)
GFR AFRICAN AMERICAN: >60 ML/MIN
GFR NON-AFRICAN AMERICAN: >60 ML/MIN
GFR SERPL CREATININE-BSD FRML MDRD: ABNORMAL ML/MIN/{1.73_M2}
GFR SERPL CREATININE-BSD FRML MDRD: ABNORMAL ML/MIN/{1.73_M2}
GLUCOSE BLD-MCNC: 107 MG/DL (ref 70–99)
HCT VFR BLD CALC: 43.1 % (ref 41–53)
HEMOGLOBIN: 14.5 G/DL (ref 13.5–17.5)
MCH RBC QN AUTO: 28.4 PG (ref 26–34)
MCHC RBC AUTO-ENTMCNC: 33.6 G/DL (ref 31–37)
MCV RBC AUTO: 84.4 FL (ref 80–100)
NRBC AUTOMATED: ABNORMAL PER 100 WBC
PDW BLD-RTO: 13.6 % (ref 11.5–14.9)
PLATELET # BLD: 206 K/UL (ref 150–450)
PMV BLD AUTO: 7.3 FL (ref 6–12)
POTASSIUM SERPL-SCNC: 4.3 MMOL/L (ref 3.7–5.3)
RBC # BLD: 5.11 M/UL (ref 4.5–5.9)
SODIUM BLD-SCNC: 140 MMOL/L (ref 135–144)
WBC # BLD: 2.6 K/UL (ref 3.5–11)

## 2018-12-31 PROCEDURE — 85027 COMPLETE CBC AUTOMATED: CPT

## 2018-12-31 PROCEDURE — 80048 BASIC METABOLIC PNL TOTAL CA: CPT

## 2018-12-31 PROCEDURE — 36415 COLL VENOUS BLD VENIPUNCTURE: CPT

## 2019-01-03 ENCOUNTER — OFFICE VISIT (OUTPATIENT)
Dept: FAMILY MEDICINE CLINIC | Age: 49
End: 2019-01-03
Payer: MEDICARE

## 2019-01-03 VITALS
WEIGHT: 211.4 LBS | BODY MASS INDEX: 30.26 KG/M2 | HEIGHT: 70 IN | SYSTOLIC BLOOD PRESSURE: 115 MMHG | HEART RATE: 61 BPM | OXYGEN SATURATION: 99 % | DIASTOLIC BLOOD PRESSURE: 75 MMHG

## 2019-01-03 DIAGNOSIS — N52.9 ERECTILE DYSFUNCTION, UNSPECIFIED ERECTILE DYSFUNCTION TYPE: ICD-10-CM

## 2019-01-03 DIAGNOSIS — I10 ESSENTIAL HYPERTENSION: ICD-10-CM

## 2019-01-03 DIAGNOSIS — F31.9 BIPOLAR 1 DISORDER (HCC): Primary | ICD-10-CM

## 2019-01-03 DIAGNOSIS — D72.819 LEUKOPENIA, UNSPECIFIED TYPE: ICD-10-CM

## 2019-01-03 PROCEDURE — G8417 CALC BMI ABV UP PARAM F/U: HCPCS | Performed by: NURSE PRACTITIONER

## 2019-01-03 PROCEDURE — 1036F TOBACCO NON-USER: CPT | Performed by: NURSE PRACTITIONER

## 2019-01-03 PROCEDURE — 99214 OFFICE O/P EST MOD 30 MIN: CPT | Performed by: NURSE PRACTITIONER

## 2019-01-03 PROCEDURE — 3017F COLORECTAL CA SCREEN DOC REV: CPT | Performed by: NURSE PRACTITIONER

## 2019-01-03 PROCEDURE — G8482 FLU IMMUNIZE ORDER/ADMIN: HCPCS | Performed by: NURSE PRACTITIONER

## 2019-01-03 PROCEDURE — G8427 DOCREV CUR MEDS BY ELIG CLIN: HCPCS | Performed by: NURSE PRACTITIONER

## 2019-01-03 ASSESSMENT — ENCOUNTER SYMPTOMS
BLOOD IN STOOL: 0
ABDOMINAL PAIN: 0
COUGH: 0
SHORTNESS OF BREATH: 0
EYE DISCHARGE: 0

## 2019-01-07 ENCOUNTER — TELEPHONE (OUTPATIENT)
Dept: FAMILY MEDICINE CLINIC | Age: 49
End: 2019-01-07

## 2019-01-07 DIAGNOSIS — N52.9 ERECTILE DYSFUNCTION, UNSPECIFIED ERECTILE DYSFUNCTION TYPE: Primary | ICD-10-CM

## 2019-01-07 RX ORDER — SILDENAFIL 50 MG/1
50 TABLET, FILM COATED ORAL PRN
Qty: 9 TABLET | Refills: 0 | Status: SHIPPED | OUTPATIENT
Start: 2019-01-07 | End: 2019-02-07 | Stop reason: SDUPTHER

## 2019-02-12 ENCOUNTER — HOSPITAL ENCOUNTER (OUTPATIENT)
Age: 49
Discharge: HOME | End: 2019-02-12
Payer: MEDICARE

## 2019-02-12 ENCOUNTER — INITIAL CONSULT (OUTPATIENT)
Dept: ONCOLOGY | Age: 49
End: 2019-02-12
Payer: MEDICARE

## 2019-02-12 VITALS
HEART RATE: 77 BPM | BODY MASS INDEX: 31.34 KG/M2 | DIASTOLIC BLOOD PRESSURE: 70 MMHG | HEIGHT: 70 IN | SYSTOLIC BLOOD PRESSURE: 111 MMHG | WEIGHT: 218.9 LBS | TEMPERATURE: 97.9 F

## 2019-02-12 DIAGNOSIS — R53.83 FATIGUE, UNSPECIFIED TYPE: ICD-10-CM

## 2019-02-12 DIAGNOSIS — R53.83 FATIGUE, UNSPECIFIED TYPE: Primary | ICD-10-CM

## 2019-02-12 DIAGNOSIS — R71.8 OTHER ABNORMALITY OF RED BLOOD CELLS: ICD-10-CM

## 2019-02-12 DIAGNOSIS — D72.819 LEUKOPENIA, UNSPECIFIED TYPE: ICD-10-CM

## 2019-02-12 LAB
ABSOLUTE EOS #: 0.07 K/UL (ref 0–0.44)
ABSOLUTE IMMATURE GRANULOCYTE: <0.03 K/UL (ref 0–0.3)
ABSOLUTE LYMPH #: 1.68 K/UL (ref 1.1–3.7)
ABSOLUTE MONO #: 0.44 K/UL (ref 0.1–1.2)
ABSOLUTE RETIC #: 0.07 M/UL (ref 0.03–0.08)
BASOPHILS # BLD: 1 % (ref 0–2)
BASOPHILS ABSOLUTE: <0.03 K/UL (ref 0–0.2)
DIFFERENTIAL TYPE: NORMAL
EOSINOPHILS RELATIVE PERCENT: 2 % (ref 1–4)
FOLATE: >20 NG/ML
HAV IGM SER IA-ACNC: NONREACTIVE
HCT VFR BLD CALC: 42.6 % (ref 40.7–50.3)
HEMOGLOBIN: 14.5 G/DL (ref 13–17)
HEPATITIS B CORE IGM ANTIBODY: NONREACTIVE
HEPATITIS B SURFACE ANTIGEN: NONREACTIVE
HEPATITIS C ANTIBODY: NONREACTIVE
IMMATURE GRANULOCYTES: 0 %
IMMATURE RETIC FRACT: 12.3 % (ref 2.7–18.3)
IRON SATURATION: 35 % (ref 20–55)
IRON: 91 UG/DL (ref 59–158)
LACTATE DEHYDROGENASE: 185 U/L (ref 135–225)
LYMPHOCYTES # BLD: 43 % (ref 24–43)
MCH RBC QN AUTO: 28.7 PG (ref 25.2–33.5)
MCHC RBC AUTO-ENTMCNC: 34 G/DL (ref 28.4–34.8)
MCV RBC AUTO: 84.4 FL (ref 82.6–102.9)
MONOCYTES # BLD: 11 % (ref 3–12)
NRBC AUTOMATED: 0 PER 100 WBC
PDW BLD-RTO: 13 % (ref 11.8–14.4)
PLATELET # BLD: 210 K/UL (ref 138–453)
PLATELET ESTIMATE: NORMAL
PMV BLD AUTO: 9.8 FL (ref 8.1–13.5)
RBC # BLD: 5.05 M/UL (ref 4.21–5.77)
RBC # BLD: NORMAL 10*6/UL
RETIC %: 1.4 % (ref 0.5–1.9)
RETIC HEMOGLOBIN: 34.3 PG (ref 28.2–35.7)
SEG NEUTROPHILS: 43 % (ref 36–65)
SEGMENTED NEUTROPHILS ABSOLUTE COUNT: 1.67 K/UL (ref 1.5–8.1)
TOTAL IRON BINDING CAPACITY: 260 UG/DL (ref 250–450)
UNSATURATED IRON BINDING CAPACITY: 169 UG/DL (ref 112–347)
VITAMIN B-12: 627 PG/ML (ref 232–1245)
WBC # BLD: 3.9 K/UL (ref 3.5–11.3)
WBC # BLD: NORMAL 10*3/UL

## 2019-02-12 PROCEDURE — 86038 ANTINUCLEAR ANTIBODIES: CPT

## 2019-02-12 PROCEDURE — 85025 COMPLETE CBC W/AUTO DIFF WBC: CPT

## 2019-02-12 PROCEDURE — 83540 ASSAY OF IRON: CPT

## 2019-02-12 PROCEDURE — 80074 ACUTE HEPATITIS PANEL: CPT

## 2019-02-12 PROCEDURE — G8417 CALC BMI ABV UP PARAM F/U: HCPCS | Performed by: INTERNAL MEDICINE

## 2019-02-12 PROCEDURE — 86225 DNA ANTIBODY NATIVE: CPT

## 2019-02-12 PROCEDURE — 99201 HC NEW PT, E/M LEVEL 1: CPT | Performed by: INTERNAL MEDICINE

## 2019-02-12 PROCEDURE — 82607 VITAMIN B-12: CPT

## 2019-02-12 PROCEDURE — 85045 AUTOMATED RETICULOCYTE COUNT: CPT

## 2019-02-12 PROCEDURE — 88185 FLOWCYTOMETRY/TC ADD-ON: CPT

## 2019-02-12 PROCEDURE — 83550 IRON BINDING TEST: CPT

## 2019-02-12 PROCEDURE — G8482 FLU IMMUNIZE ORDER/ADMIN: HCPCS | Performed by: INTERNAL MEDICINE

## 2019-02-12 PROCEDURE — 82746 ASSAY OF FOLIC ACID SERUM: CPT

## 2019-02-12 PROCEDURE — G8427 DOCREV CUR MEDS BY ELIG CLIN: HCPCS | Performed by: INTERNAL MEDICINE

## 2019-02-12 PROCEDURE — 86665 EPSTEIN-BARR CAPSID VCA: CPT

## 2019-02-12 PROCEDURE — 36415 COLL VENOUS BLD VENIPUNCTURE: CPT

## 2019-02-12 PROCEDURE — 99204 OFFICE O/P NEW MOD 45 MIN: CPT | Performed by: INTERNAL MEDICINE

## 2019-02-12 PROCEDURE — 86235 NUCLEAR ANTIGEN ANTIBODY: CPT

## 2019-02-12 PROCEDURE — 83615 LACTATE (LD) (LDH) ENZYME: CPT

## 2019-02-12 PROCEDURE — 86663 EPSTEIN-BARR ANTIBODY: CPT

## 2019-02-12 PROCEDURE — 86664 EPSTEIN-BARR NUCLEAR ANTIGEN: CPT

## 2019-02-12 PROCEDURE — 88184 FLOWCYTOMETRY/ TC 1 MARKER: CPT

## 2019-02-12 PROCEDURE — 3017F COLORECTAL CA SCREEN DOC REV: CPT | Performed by: INTERNAL MEDICINE

## 2019-02-13 LAB
ANA REFERENCE RANGE:: ABNORMAL
ANTI DNA DOUBLE STRANDED: 136 IU/ML
ANTI JO-1 IGG: 17 U/ML
ANTI RNP AB: 31 U/ML
ANTI SSA: 9 U/ML
ANTI SSB: 25 U/ML
ANTI-CENTROMERE: 30 U/ML
ANTI-NUCLEAR ANTIBODY (ANA): POSITIVE
ANTI-SCLERODERMA: 36 U/ML
ANTI-SMITH: 21 U/ML
HISTONE ANTIBODY: 49 U/ML

## 2019-02-14 LAB
EBV EARLY ANTIGEN IGG: 47 U/ML
EBV INTERPRETATION: ABNORMAL
EBV NUCLEAR AG AB: 403 U/ML
EPSTEIN-BARR VCA IGG: 1082 U/ML
EPSTEIN-BARR VCA IGM: 27 U/ML
SURGICAL PATHOLOGY REPORT: NORMAL

## 2019-02-15 LAB — FLOW CYTOMETRY BL: NORMAL

## 2019-02-18 ENCOUNTER — OFFICE VISIT (OUTPATIENT)
Dept: FAMILY MEDICINE CLINIC | Age: 49
End: 2019-02-18
Payer: MEDICARE

## 2019-02-18 ENCOUNTER — HOSPITAL ENCOUNTER (OUTPATIENT)
Dept: ULTRASOUND IMAGING | Age: 49
Discharge: HOME | End: 2019-02-20
Payer: MEDICARE

## 2019-02-18 VITALS
WEIGHT: 220 LBS | HEIGHT: 70 IN | DIASTOLIC BLOOD PRESSURE: 70 MMHG | OXYGEN SATURATION: 98 % | TEMPERATURE: 98.6 F | SYSTOLIC BLOOD PRESSURE: 130 MMHG | BODY MASS INDEX: 31.5 KG/M2 | HEART RATE: 70 BPM

## 2019-02-18 DIAGNOSIS — N52.9 ERECTILE DYSFUNCTION, UNSPECIFIED ERECTILE DYSFUNCTION TYPE: ICD-10-CM

## 2019-02-18 DIAGNOSIS — D72.819 LEUKOPENIA, UNSPECIFIED TYPE: ICD-10-CM

## 2019-02-18 DIAGNOSIS — N28.9 RENAL INSUFFICIENCY: ICD-10-CM

## 2019-02-18 DIAGNOSIS — E66.9 CLASS 1 OBESITY WITH SERIOUS COMORBIDITY AND BODY MASS INDEX (BMI) OF 31.0 TO 31.9 IN ADULT, UNSPECIFIED OBESITY TYPE: ICD-10-CM

## 2019-02-18 DIAGNOSIS — I10 ESSENTIAL HYPERTENSION: Primary | ICD-10-CM

## 2019-02-18 PROBLEM — F33.2 MAJOR DEPRESSIVE DISORDER, RECURRENT SEVERE WITHOUT PSYCHOTIC FEATURES (HCC): Status: ACTIVE | Noted: 2019-02-18

## 2019-02-18 PROBLEM — F31.5 BIPOLAR DISORD, CRNT EPSD DEPRESS, SEVERE, W PSYCH FEATURES (HCC): Status: ACTIVE | Noted: 2019-02-18

## 2019-02-18 PROBLEM — F33.2 MAJOR DEPRESSIVE DISORDER, RECURRENT SEVERE WITHOUT PSYCHOTIC FEATURES (HCC): Status: RESOLVED | Noted: 2019-02-18 | Resolved: 2019-02-18

## 2019-02-18 PROBLEM — Z65.2 PROBLEMS RELATED TO RELEASE FROM PRISON: Status: ACTIVE | Noted: 2019-02-18

## 2019-02-18 PROBLEM — F33.3 MAJOR DEPRESSIVE DISORDER, RECURRENT EPISODE, SEVERE, WITH PSYCHOTIC BEHAVIOR (HCC): Status: ACTIVE | Noted: 2019-02-18

## 2019-02-18 PROBLEM — E66.811 CLASS 1 OBESITY WITH SERIOUS COMORBIDITY AND BODY MASS INDEX (BMI) OF 31.0 TO 31.9 IN ADULT: Status: ACTIVE | Noted: 2019-02-18

## 2019-02-18 PROBLEM — F33.3 MAJOR DEPRESSIVE DISORDER, RECURRENT EPISODE, SEVERE, WITH PSYCHOTIC BEHAVIOR (HCC): Status: RESOLVED | Noted: 2019-02-18 | Resolved: 2019-02-18

## 2019-02-18 PROBLEM — F31.5 BIPOLAR DISORD, CRNT EPSD DEPRESS, SEVERE, W PSYCH FEATURES (HCC): Status: RESOLVED | Noted: 2019-02-18 | Resolved: 2019-02-18

## 2019-02-18 PROCEDURE — 1036F TOBACCO NON-USER: CPT | Performed by: NURSE PRACTITIONER

## 2019-02-18 PROCEDURE — G8482 FLU IMMUNIZE ORDER/ADMIN: HCPCS | Performed by: NURSE PRACTITIONER

## 2019-02-18 PROCEDURE — G8417 CALC BMI ABV UP PARAM F/U: HCPCS | Performed by: NURSE PRACTITIONER

## 2019-02-18 PROCEDURE — 76705 ECHO EXAM OF ABDOMEN: CPT

## 2019-02-18 PROCEDURE — 3017F COLORECTAL CA SCREEN DOC REV: CPT | Performed by: NURSE PRACTITIONER

## 2019-02-18 PROCEDURE — G8427 DOCREV CUR MEDS BY ELIG CLIN: HCPCS | Performed by: NURSE PRACTITIONER

## 2019-02-18 PROCEDURE — 99214 OFFICE O/P EST MOD 30 MIN: CPT | Performed by: NURSE PRACTITIONER

## 2019-02-18 RX ORDER — HYDROCHLOROTHIAZIDE 12.5 MG/1
TABLET ORAL
Qty: 90 TABLET | Refills: 0 | Status: SHIPPED | OUTPATIENT
Start: 2019-02-18 | End: 2019-03-18 | Stop reason: ALTCHOICE

## 2019-02-18 RX ORDER — SILDENAFIL 100 MG/1
TABLET, FILM COATED ORAL
Qty: 15 TABLET | Refills: 1 | Status: SHIPPED | OUTPATIENT
Start: 2019-02-18 | End: 2019-03-18 | Stop reason: SDUPTHER

## 2019-02-18 RX ORDER — LABETALOL 100 MG/1
100 TABLET, FILM COATED ORAL 2 TIMES DAILY
Qty: 180 TABLET | Refills: 1 | Status: CANCELLED | OUTPATIENT
Start: 2019-02-18

## 2019-02-18 RX ORDER — LABETALOL 100 MG/1
1 TABLET, FILM COATED ORAL 2 TIMES DAILY
COMMUNITY
End: 2019-02-18 | Stop reason: ALTCHOICE

## 2019-02-18 RX ORDER — HYDROCHLOROTHIAZIDE 12.5 MG/1
1 TABLET ORAL DAILY
COMMUNITY
End: 2019-02-18 | Stop reason: SDUPTHER

## 2019-02-18 RX ORDER — ARIPIPRAZOLE 5 MG/1
1 TABLET ORAL DAILY
COMMUNITY
Start: 2019-01-10 | End: 2019-03-18 | Stop reason: ALTCHOICE

## 2019-02-18 RX ORDER — LISINOPRIL 5 MG/1
1 TABLET ORAL DAILY
COMMUNITY
End: 2019-02-18 | Stop reason: SDUPTHER

## 2019-02-18 RX ORDER — LISINOPRIL 10 MG/1
TABLET ORAL
Qty: 90 TABLET | Refills: 1 | Status: SHIPPED | OUTPATIENT
Start: 2019-02-18 | End: 2019-06-17 | Stop reason: SDUPTHER

## 2019-02-18 RX ORDER — CARVEDILOL 6.25 MG/1
6.25 TABLET ORAL 2 TIMES DAILY
Qty: 180 TABLET | Refills: 1 | Status: CANCELLED | OUTPATIENT
Start: 2019-02-18

## 2019-02-18 ASSESSMENT — PATIENT HEALTH QUESTIONNAIRE - PHQ9
SUM OF ALL RESPONSES TO PHQ QUESTIONS 1-9: 0
2. FEELING DOWN, DEPRESSED OR HOPELESS: 0
SUM OF ALL RESPONSES TO PHQ9 QUESTIONS 1 & 2: 0
1. LITTLE INTEREST OR PLEASURE IN DOING THINGS: 0
SUM OF ALL RESPONSES TO PHQ QUESTIONS 1-9: 0

## 2019-02-18 ASSESSMENT — ENCOUNTER SYMPTOMS
COUGH: 0
GASTROINTESTINAL NEGATIVE: 1
EYES NEGATIVE: 1
WHEEZING: 0
SHORTNESS OF BREATH: 0
RESPIRATORY NEGATIVE: 1

## 2019-02-26 ENCOUNTER — TELEPHONE (OUTPATIENT)
Dept: ONCOLOGY | Age: 49
End: 2019-02-26

## 2019-02-26 ENCOUNTER — OFFICE VISIT (OUTPATIENT)
Dept: ONCOLOGY | Age: 49
End: 2019-02-26
Payer: MEDICARE

## 2019-02-26 VITALS
TEMPERATURE: 98.2 F | BODY MASS INDEX: 31.64 KG/M2 | SYSTOLIC BLOOD PRESSURE: 127 MMHG | WEIGHT: 220.5 LBS | HEART RATE: 65 BPM | RESPIRATION RATE: 16 BRPM | DIASTOLIC BLOOD PRESSURE: 78 MMHG

## 2019-02-26 DIAGNOSIS — R76.8 ANA POSITIVE: ICD-10-CM

## 2019-02-26 DIAGNOSIS — D72.819 LEUKOPENIA, UNSPECIFIED TYPE: Primary | ICD-10-CM

## 2019-02-26 PROCEDURE — G8482 FLU IMMUNIZE ORDER/ADMIN: HCPCS | Performed by: INTERNAL MEDICINE

## 2019-02-26 PROCEDURE — 99214 OFFICE O/P EST MOD 30 MIN: CPT | Performed by: INTERNAL MEDICINE

## 2019-02-26 PROCEDURE — G8427 DOCREV CUR MEDS BY ELIG CLIN: HCPCS | Performed by: INTERNAL MEDICINE

## 2019-02-26 PROCEDURE — 99211 OFF/OP EST MAY X REQ PHY/QHP: CPT | Performed by: INTERNAL MEDICINE

## 2019-02-26 PROCEDURE — 1036F TOBACCO NON-USER: CPT | Performed by: INTERNAL MEDICINE

## 2019-02-26 PROCEDURE — G8417 CALC BMI ABV UP PARAM F/U: HCPCS | Performed by: INTERNAL MEDICINE

## 2019-02-26 PROCEDURE — 3017F COLORECTAL CA SCREEN DOC REV: CPT | Performed by: INTERNAL MEDICINE

## 2019-03-18 ENCOUNTER — OFFICE VISIT (OUTPATIENT)
Dept: FAMILY MEDICINE CLINIC | Age: 49
End: 2019-03-18
Payer: MEDICARE

## 2019-03-18 VITALS
SYSTOLIC BLOOD PRESSURE: 130 MMHG | TEMPERATURE: 98.4 F | WEIGHT: 218 LBS | DIASTOLIC BLOOD PRESSURE: 80 MMHG | OXYGEN SATURATION: 98 % | BODY MASS INDEX: 31.28 KG/M2 | HEART RATE: 68 BPM

## 2019-03-18 DIAGNOSIS — N52.9 ERECTILE DYSFUNCTION, UNSPECIFIED ERECTILE DYSFUNCTION TYPE: ICD-10-CM

## 2019-03-18 DIAGNOSIS — Z83.3 FAMILY HISTORY OF DIABETES MELLITUS: ICD-10-CM

## 2019-03-18 DIAGNOSIS — R76.8 ANA POSITIVE: ICD-10-CM

## 2019-03-18 DIAGNOSIS — I10 ESSENTIAL HYPERTENSION: Primary | ICD-10-CM

## 2019-03-18 PROCEDURE — 3017F COLORECTAL CA SCREEN DOC REV: CPT | Performed by: NURSE PRACTITIONER

## 2019-03-18 PROCEDURE — G8427 DOCREV CUR MEDS BY ELIG CLIN: HCPCS | Performed by: NURSE PRACTITIONER

## 2019-03-18 PROCEDURE — G8417 CALC BMI ABV UP PARAM F/U: HCPCS | Performed by: NURSE PRACTITIONER

## 2019-03-18 PROCEDURE — 99214 OFFICE O/P EST MOD 30 MIN: CPT | Performed by: NURSE PRACTITIONER

## 2019-03-18 PROCEDURE — 1036F TOBACCO NON-USER: CPT | Performed by: NURSE PRACTITIONER

## 2019-03-18 PROCEDURE — G8482 FLU IMMUNIZE ORDER/ADMIN: HCPCS | Performed by: NURSE PRACTITIONER

## 2019-03-18 RX ORDER — LAMOTRIGINE 25 MG/1
TABLET ORAL
COMMUNITY
Start: 2019-03-06 | End: 2022-09-19 | Stop reason: DRUGHIGH

## 2019-03-18 RX ORDER — SILDENAFIL 100 MG/1
TABLET, FILM COATED ORAL
Qty: 15 TABLET | Refills: 5 | Status: SHIPPED | OUTPATIENT
Start: 2019-03-18 | End: 2019-09-18 | Stop reason: SDUPTHER

## 2019-03-18 ASSESSMENT — ENCOUNTER SYMPTOMS
GASTROINTESTINAL NEGATIVE: 1
SHORTNESS OF BREATH: 0
COUGH: 0
EYES NEGATIVE: 1
WHEEZING: 0
RESPIRATORY NEGATIVE: 1

## 2019-03-18 ASSESSMENT — PATIENT HEALTH QUESTIONNAIRE - PHQ9
1. LITTLE INTEREST OR PLEASURE IN DOING THINGS: 0
2. FEELING DOWN, DEPRESSED OR HOPELESS: 0
SUM OF ALL RESPONSES TO PHQ9 QUESTIONS 1 & 2: 0
SUM OF ALL RESPONSES TO PHQ QUESTIONS 1-9: 0
SUM OF ALL RESPONSES TO PHQ QUESTIONS 1-9: 0

## 2019-03-21 ENCOUNTER — TELEPHONE (OUTPATIENT)
Dept: FAMILY MEDICINE CLINIC | Age: 49
End: 2019-03-21

## 2019-03-21 DIAGNOSIS — Z20.828 EXPOSURE TO HERPES: Primary | ICD-10-CM

## 2019-03-25 ENCOUNTER — HOSPITAL ENCOUNTER (OUTPATIENT)
Age: 49
Setting detail: SPECIMEN
Discharge: HOME | End: 2019-03-25
Payer: MEDICARE

## 2019-03-25 DIAGNOSIS — Z20.828 EXPOSURE TO HERPES: ICD-10-CM

## 2019-03-26 LAB
HERPES SIMPLEX VIRUS 1 IGG: 4.21
HERPES SIMPLEX VIRUS 2 IGG: 1.08

## 2019-06-14 ENCOUNTER — TELEPHONE (OUTPATIENT)
Dept: ONCOLOGY | Age: 49
End: 2019-06-14

## 2019-06-14 NOTE — TELEPHONE ENCOUNTER
Called [facility] [address] [facility] [address], [address], [address] [facility] [address]   [place], [facility]   [phone] to get office notes for upcoming appt. With Dr. Herminio Christopher , on6/18/19, office stated he was called back in February 2019, and has not returned there phone call.

## 2019-06-17 ENCOUNTER — OFFICE VISIT (OUTPATIENT)
Dept: FAMILY MEDICINE CLINIC | Age: 49
End: 2019-06-17
Payer: MEDICARE

## 2019-06-17 VITALS
BODY MASS INDEX: 32.71 KG/M2 | DIASTOLIC BLOOD PRESSURE: 78 MMHG | SYSTOLIC BLOOD PRESSURE: 120 MMHG | WEIGHT: 228 LBS | HEART RATE: 105 BPM | OXYGEN SATURATION: 99 %

## 2019-06-17 DIAGNOSIS — I10 ESSENTIAL HYPERTENSION: Primary | ICD-10-CM

## 2019-06-17 DIAGNOSIS — A60.02 HERPES GENITALIS IN MEN: ICD-10-CM

## 2019-06-17 PROCEDURE — G8427 DOCREV CUR MEDS BY ELIG CLIN: HCPCS | Performed by: NURSE PRACTITIONER

## 2019-06-17 PROCEDURE — G8417 CALC BMI ABV UP PARAM F/U: HCPCS | Performed by: NURSE PRACTITIONER

## 2019-06-17 PROCEDURE — 99213 OFFICE O/P EST LOW 20 MIN: CPT | Performed by: NURSE PRACTITIONER

## 2019-06-17 PROCEDURE — 3017F COLORECTAL CA SCREEN DOC REV: CPT | Performed by: NURSE PRACTITIONER

## 2019-06-17 PROCEDURE — 1036F TOBACCO NON-USER: CPT | Performed by: NURSE PRACTITIONER

## 2019-06-17 RX ORDER — VALACYCLOVIR HYDROCHLORIDE 1 G/1
1000 TABLET, FILM COATED ORAL 2 TIMES DAILY
Qty: 14 TABLET | Refills: 0 | Status: SHIPPED | OUTPATIENT
Start: 2019-06-17 | End: 2019-06-24

## 2019-06-17 RX ORDER — LISINOPRIL 20 MG/1
TABLET ORAL
Qty: 90 TABLET | Refills: 1 | Status: SHIPPED | OUTPATIENT
Start: 2019-06-17 | End: 2019-06-18 | Stop reason: SINTOL

## 2019-06-17 RX ORDER — VALACYCLOVIR HYDROCHLORIDE 500 MG/1
500 TABLET, FILM COATED ORAL DAILY
Qty: 90 TABLET | Refills: 1 | Status: SHIPPED | OUTPATIENT
Start: 2019-06-17 | End: 2019-12-12 | Stop reason: SDUPTHER

## 2019-06-17 ASSESSMENT — ENCOUNTER SYMPTOMS: RESPIRATORY NEGATIVE: 1

## 2019-06-17 NOTE — PROGRESS NOTES
Pt comes in today for HSV break out/  Pt also wants to discuss BP. Visit Information    Have you changed or started any medications since your last visit including any over-the-counter medicines, vitamins, or herbal medicines? no   Have you stopped taking any of your medications? Is so, why? -  no  Are you having any side effects from any of your medications? - no    Have you seen any other physician or provider since your last visit?  no   Have you had any other diagnostic tests since your last visit?  no   Have you been seen in the emergency room and/or had an admission in a hospital since we last saw you?  no   Have you had your routine dental cleaning in the past 6 months?  yes - appoitnment this month     Do you have an active MyChart account? If no, what is the barrier?   Yes    Patient Care Team:  Louanne Mcardle, APRN - CNP as PCP - General (Family Nurse Practitioner)  Louanne Mcardle, APRN - CNP as PCP - [facility]  Jackie Shetty MD as Consulting Physician (Hematology and Oncology)  HARRY Gutiérrez CNP as Consulting Physician (Nurse Practitioner)    Medical History Review  Past Medical, Family, and Social History reviewed and does contribute to the patient presenting condition    Health Maintenance   Topic Date Due    Shingles Vaccine (1 of 2) 02/18/2020 (Originally 3/21/2018)    Colon Cancer Screen FIT/FOBT  09/07/2019    Potassium monitoring  12/31/2019    Creatinine monitoring  12/31/2019    Diabetes screen  06/04/2021    Lipid screen  09/07/2023    DTaP/Tdap/Td vaccine (2 - Td) 05/29/2028    Flu vaccine  Completed    HIV screen  Completed    Pneumococcal 0-64 years Vaccine  Aged Out
Head: Normocephalic. Cardiovascular: Normal rate, regular rhythm and normal heart sounds. Pulmonary/Chest: Effort normal and breath sounds normal.   Genitourinary:   Genitourinary Comments: Tip of penis with erythematic lesion just at the urethral opening, scattered crusted lesions in close proximity   Neurological: He is alert and oriented to person, place, and time. Skin: Skin is warm and dry. Psychiatric: He has a normal mood and affect. His behavior is normal. Judgment and thought content normal.   Vitals reviewed. Assessment:      1. Essential hypertension    2. Herpes genitalis in men                     Plan:      No orders of the defined types were placed in this encounter. 1. Essential hypertension  well controlled, continue plan of care    - lisinopril (PRINIVIL;ZESTRIL) 20 MG tablet; TAKE 1 TABLET BY MOUTH ONE TIME A DAY  Dispense: 90 tablet; Refill: 1    2. Herpes genitalis in men  disucssion over options for treating, he would like to be on preventative dosing. He will do 2 gm daily x 7 days and then 500 mg daily. - valACYclovir (VALTREX) 500 MG tablet; Take 1 tablet by mouth daily Start within 72 hours of sx onset  Dispense: 90 tablet; Refill: 1  - valACYclovir (VALTREX) 1 g tablet; Take 1 tablet by mouth 2 times daily for 7 days Start within 24 hours of sx  Dispense: 14 tablet; Refill: 0        No follow-ups on file. Patient given educational materials - see patientinstructions. Discussed use, benefit, and side effects of prescribed medications. All patient questions answered. Pt voiced understanding. Reviewed health maintenance. Instructed to continue current medications, diet and exercise. Patient agreedwith treatment plan. Follow up as directed.      Electronically signed by HARRY Cheek CNP on 6/17/2019

## 2019-06-18 ENCOUNTER — TELEPHONE (OUTPATIENT)
Dept: FAMILY MEDICINE CLINIC | Age: 49
End: 2019-06-18

## 2019-06-18 ENCOUNTER — HOSPITAL ENCOUNTER (OUTPATIENT)
Age: 49
Discharge: HOME | End: 2019-06-18
Payer: MEDICARE

## 2019-06-18 ENCOUNTER — HOSPITAL ENCOUNTER (EMERGENCY)
Age: 49
Discharge: HOME | End: 2019-06-18
Attending: EMERGENCY MEDICINE
Payer: MEDICARE

## 2019-06-18 ENCOUNTER — OFFICE VISIT (OUTPATIENT)
Dept: ONCOLOGY | Age: 49
End: 2019-06-18
Payer: MEDICARE

## 2019-06-18 VITALS
TEMPERATURE: 98.3 F | BODY MASS INDEX: 32.04 KG/M2 | SYSTOLIC BLOOD PRESSURE: 132 MMHG | HEART RATE: 82 BPM | DIASTOLIC BLOOD PRESSURE: 79 MMHG | WEIGHT: 223.3 LBS

## 2019-06-18 VITALS
HEIGHT: 70 IN | DIASTOLIC BLOOD PRESSURE: 86 MMHG | BODY MASS INDEX: 31.5 KG/M2 | SYSTOLIC BLOOD PRESSURE: 130 MMHG | RESPIRATION RATE: 18 BRPM | OXYGEN SATURATION: 100 % | TEMPERATURE: 98.2 F | HEART RATE: 65 BPM | WEIGHT: 220 LBS

## 2019-06-18 DIAGNOSIS — D72.819 LEUKOPENIA, UNSPECIFIED TYPE: ICD-10-CM

## 2019-06-18 DIAGNOSIS — T78.3XXA ANGIOEDEMA, INITIAL ENCOUNTER: Primary | ICD-10-CM

## 2019-06-18 DIAGNOSIS — D72.819 LEUKOPENIA, UNSPECIFIED TYPE: Primary | ICD-10-CM

## 2019-06-18 LAB
ABSOLUTE EOS #: 0 K/UL (ref 0–0.4)
ABSOLUTE IMMATURE GRANULOCYTE: ABNORMAL K/UL (ref 0–0.3)
ABSOLUTE LYMPH #: 0.7 K/UL (ref 1–4.8)
ABSOLUTE MONO #: 0.1 K/UL (ref 0.1–1.2)
BASOPHILS # BLD: 1 % (ref 0–2)
BASOPHILS ABSOLUTE: 0 K/UL (ref 0–0.2)
DIFFERENTIAL TYPE: ABNORMAL
EOSINOPHILS RELATIVE PERCENT: 0 % (ref 1–4)
HCT VFR BLD CALC: 45.2 % (ref 41–53)
HEMOGLOBIN: 15.8 G/DL (ref 13.5–17.5)
IMMATURE GRANULOCYTES: ABNORMAL %
LYMPHOCYTES # BLD: 14 % (ref 24–44)
MCH RBC QN AUTO: 29.1 PG (ref 26–34)
MCHC RBC AUTO-ENTMCNC: 34.8 G/DL (ref 31–37)
MCV RBC AUTO: 83.5 FL (ref 80–100)
MONOCYTES # BLD: 2 % (ref 2–11)
NRBC AUTOMATED: ABNORMAL PER 100 WBC
PDW BLD-RTO: 13.5 % (ref 12.5–15.4)
PLATELET # BLD: 243 K/UL (ref 140–450)
PLATELET ESTIMATE: ABNORMAL
PMV BLD AUTO: 7.3 FL (ref 6–12)
RBC # BLD: 5.42 M/UL (ref 4.5–5.9)
RBC # BLD: ABNORMAL 10*6/UL
SEG NEUTROPHILS: 83 % (ref 36–66)
SEGMENTED NEUTROPHILS ABSOLUTE COUNT: 4.1 K/UL (ref 1.8–7.7)
WBC # BLD: 4.9 K/UL (ref 3.5–11)
WBC # BLD: ABNORMAL 10*3/UL

## 2019-06-18 PROCEDURE — G8417 CALC BMI ABV UP PARAM F/U: HCPCS | Performed by: INTERNAL MEDICINE

## 2019-06-18 PROCEDURE — G8427 DOCREV CUR MEDS BY ELIG CLIN: HCPCS | Performed by: INTERNAL MEDICINE

## 2019-06-18 PROCEDURE — 99282 EMERGENCY DEPT VISIT SF MDM: CPT

## 2019-06-18 PROCEDURE — 99213 OFFICE O/P EST LOW 20 MIN: CPT | Performed by: INTERNAL MEDICINE

## 2019-06-18 PROCEDURE — 99211 OFF/OP EST MAY X REQ PHY/QHP: CPT

## 2019-06-18 PROCEDURE — 6370000000 HC RX 637 (ALT 250 FOR IP): Performed by: EMERGENCY MEDICINE

## 2019-06-18 PROCEDURE — 1036F TOBACCO NON-USER: CPT | Performed by: INTERNAL MEDICINE

## 2019-06-18 PROCEDURE — 36415 COLL VENOUS BLD VENIPUNCTURE: CPT

## 2019-06-18 PROCEDURE — 85025 COMPLETE CBC W/AUTO DIFF WBC: CPT

## 2019-06-18 PROCEDURE — 3017F COLORECTAL CA SCREEN DOC REV: CPT | Performed by: INTERNAL MEDICINE

## 2019-06-18 RX ORDER — DIPHENHYDRAMINE HYDROCHLORIDE 50 MG/ML
50 INJECTION INTRAMUSCULAR; INTRAVENOUS ONCE
Status: DISCONTINUED | OUTPATIENT
Start: 2019-06-18 | End: 2019-06-18

## 2019-06-18 RX ORDER — PREDNISONE 20 MG/1
60 TABLET ORAL ONCE
Status: COMPLETED | OUTPATIENT
Start: 2019-06-18 | End: 2019-06-18

## 2019-06-18 RX ORDER — METHYLPREDNISOLONE SODIUM SUCCINATE 125 MG/2ML
125 INJECTION, POWDER, LYOPHILIZED, FOR SOLUTION INTRAMUSCULAR; INTRAVENOUS ONCE
Status: DISCONTINUED | OUTPATIENT
Start: 2019-06-18 | End: 2019-06-18

## 2019-06-18 RX ORDER — FAMOTIDINE 20 MG/1
20 TABLET, FILM COATED ORAL ONCE
Status: COMPLETED | OUTPATIENT
Start: 2019-06-18 | End: 2019-06-18

## 2019-06-18 RX ORDER — EPINEPHRINE 0.3 MG/.3ML
0.3 INJECTION SUBCUTANEOUS
Qty: 2 EACH | Refills: 1 | Status: SHIPPED | OUTPATIENT
Start: 2019-06-18 | End: 2020-04-14

## 2019-06-18 RX ORDER — PREDNISONE 20 MG/1
60 TABLET ORAL DAILY
Qty: 12 TABLET | Refills: 0 | Status: SHIPPED | OUTPATIENT
Start: 2019-06-18 | End: 2019-06-22

## 2019-06-18 RX ORDER — EPINEPHRINE 1 MG/ML
0.3 INJECTION, SOLUTION, CONCENTRATE INTRAVENOUS ONCE
Status: DISCONTINUED | OUTPATIENT
Start: 2019-06-18 | End: 2019-06-18

## 2019-06-18 RX ORDER — AMLODIPINE BESYLATE 5 MG/1
5 TABLET ORAL DAILY
Qty: 30 TABLET | Refills: 3 | Status: SHIPPED | OUTPATIENT
Start: 2019-06-18 | End: 2019-09-18 | Stop reason: SDUPTHER

## 2019-06-18 RX ADMIN — PREDNISONE 60 MG: 20 TABLET ORAL at 03:10

## 2019-06-18 RX ADMIN — FAMOTIDINE 20 MG: 20 TABLET ORAL at 03:10

## 2019-06-18 NOTE — ED PROVIDER NOTES
eMERGENCY dEPARTMENT eNCOUnter    Pt Name: Rolanda Pritchett  MRN: [id number]  [place] 1970  Date of evaluation: 6/18/19  CHIEF COMPLAINT       Chief Complaint   Patient presents with    Angioedema     HISTORY OF PRESENT ILLNESS   HPI  HISTORY OF PRESENT ILLNESS:  Past medical history of hypertension on lisinopril presents for chief complaint of swelling of his upper lip. Patient has had intermittent swelling of his upper lip over the past 24 hours. Patient denies any other allergen exposures. Denies any swelling of his tongue or trouble speaking. No swelling of his throat or trouble swallowing. No shortness of breath. Severity is moderate. No aggravating or relieving factors. Timing is one day. Course is intermittent. Context is lisinopril  -----------------------  -----------------------  REVIEW OF SYSTEMS  ED Caveat: [none]  Gen:  No fever, no chills  CV: No CP, no palpitations  Resp: No SOB, no respiratory distress  GI: No V/D, no abd pain  : No dysuria, no increased frequency  Skin: No rash, no purulent lesions  Eyes: No blurry vision, No double vision  MSK: No back pain, no joint pain  Neuro: No HA, no sensation changes  Psych: No SI/HI  -----------------------  -----------------------  ALLERGIES  -per nursing records, reviewed    PAST MEDICAL HISTORY  -See HPI    SOCIAL HISTORY  -No daily drinking, no IV drugs  -----------------------  -----------------------  PHYSICAL EXAM  Gen: Alert, no acute distress  Skin: Warm, no rashes  Head: Normocephalic, atraumatic  Neck: No midline tenderness, no nuchal rigidity  Eye: EOMI, PERRLA, normal conjunctiva  ENT: Mucous membranes moist, no pharyngeal erythema, airway patent. No drooling,  no tripoding,  no elevation of the tongue or swelling in the floor of the mouth, no difficulty extending the neck, no changes in phonation, no uvular deviation.   Mild swelling of the upper lip  CV: Normal rate, no rubs  Resp: Respirations unlabored, lungs clear to auscultation  GI: Soft, non distended, no large abdominal masses, non tender  MSK: No midline back pain, no large joint effusions  Neuro: Alert and oriented, no focal neurological deficits observed  Psych: Cooperative, appropriate mood and affect  -----------------------  -----------------------  MEDICAL DECISION MAKING  Differential Diagnosis:  - Consideration is given for   allergic reaction, angioedema, lisinopril induced angioedema, herpes  -  #Impression/Plan:  - Clinically patient's presentation is most consistent with mild angioedema. Patient has had the symptoms for greater than 24 hours and does not want to stay here to be monitored for period of time. Given the fact that he has absolutely no swelling in his oropharynx and is in no respiratory distress is not drooling is not tripoding and that this is been going on 24 hours I feel that this is likely reasonable at this time. I did discuss the risks of this. Patient understands he is to stop taking lisinopril and call his doctor. - pt is refusing to be monitored here because he wants to go to work  ##Diagnosis:  -Angioedema  -  -----------------------  -----------------------  Alyse Singh MD, [facility]  Emergency Medicine Attending  Questions? Please contact my cell phone anytime.   [phone]  *This charting supersedes any ED resident or staff charting and was written using speech recognition software  PASTMEDICAL HISTORY     Past Medical History:   Diagnosis Date    Bipolar 1 disorder ([facility] [address]     Depression     prev on zoloft , abilify     Hypertension     Renal insufficiency 11/27/2018     SURGICAL HISTORY       Past Surgical History:   Procedure Laterality Date    BICEPS TENDON REPAIR Left      CURRENT MEDICATIONS       Previous Medications    ASPIRIN 81 MG EC TABLET    TAKE 1 TABLET BY MOUTH ONE TIME A DAY    LAMOTRIGINE (LAMICTAL) 25 MG TABLET    take 1 tab daily X 2 weeks, then 2 tabs daily in am    LISINOPRIL (PRINIVIL;ZESTRIL) 20 MG TABLET TAKE 1 TABLET BY MOUTH ONE TIME A DAY    SERTRALINE (ZOLOFT) 50 MG TABLET    Take 50 mg by mouth daily     SILDENAFIL (VIAGRA) 100 MG TABLET    TAKE 1 TABLET BY MOUTH ONE TIME A DAY AS NEEDED FOR ERECTILE DYSFUNCTION    VALACYCLOVIR (VALTREX) 1 G TABLET    Take 1 tablet by mouth 2 times daily for 7 days Start within 24 hours of sx    VALACYCLOVIR (VALTREX) 500 MG TABLET    Take 1 tablet by mouth daily Start within 72 hours of sx onset     ALLERGIES     is allergic to latex; nsaids; and pcn [penicillins]. FAMILY HISTORY     indicated that his mother is alive. He indicated that his father is . He indicated that both of his sisters are alive. He indicated that all of his four brothers are alive.      SOCIAL HISTORY       Social History     Tobacco Use    Smoking status: Former Smoker     Packs/day: 0.50     Years: 15.00     Pack years: 7.50     Types: Cigarettes    Smokeless tobacco: Never Used    Tobacco comment: quit    Substance Use Topics    Alcohol use: No     Comment: none since     Drug use: No     PHYSICAL EXAM     INITIAL VITALS: /86   Pulse 65   Temp 98.2 °F (36.8 °C) (Oral)   Resp 18   Ht 5' 10\" (1.778 m)   Wt 220 lb (99.8 kg)   SpO2 100%   BMI 31.57 kg/m²     Physical Exam    MEDICAL DECISION MAKING:            Labs Reviewed - No data to display  EMERGENCY DEPARTMENTCOURSE:         Vitals:    Vitals:    19 0258   BP: 130/86   Pulse: 65   Resp: 18   Temp: 98.2 °F (36.8 °C)   TempSrc: Oral   SpO2: 100%   Weight: 220 lb (99.8 kg)   Height: 5' 10\" (1.778 m)       The patient was given the following medications while in the emergency department:  Orders Placed This Encounter   Medications    DISCONTD: famotidine (PEPCID) injection 20 mg    DISCONTD: diphenhydrAMINE (BENADRYL) injection 50 mg    DISCONTD: methylPREDNISolone sodium (SOLU-MEDROL) injection 125 mg    DISCONTD: EPINEPHrine PF 1 MG/ML injection 0.3 mg    predniSONE (DELTASONE) tablet 60 mg    famotidine (PEPCID) tablet 20 mg    EPINEPHrine (EPIPEN 2-LAURA) 0.3 MG/0.3ML SOAJ injection     Sig: Inject 0.3 mLs into the muscle once as needed (severe allergic reaction)     Dispense:  2 each     Refill:  1    predniSONE (DELTASONE) 20 MG tablet     Sig: Take 3 tablets by mouth daily for 4 days     Dispense:  12 tablet     Refill:  0     CONSULTS:  None    FINAL IMPRESSION      1. Angioedema, initial encounter          DISPOSITION/PLAN   DISPOSITION Decision To Discharge 06/18/2019 03:10:30 AM      PATIENT REFERRED TO:  No follow-up provider specified.   DISCHARGE MEDICATIONS:  New Prescriptions    EPINEPHRINE (EPIPEN 2-LAURA) 0.3 MG/0.3ML SOAJ INJECTION    Inject 0.3 mLs into the muscle once as needed (severe allergic reaction)    PREDNISONE (DELTASONE) 20 MG TABLET    Take 3 tablets by mouth daily for 4 days     Don Pennington MD  Attending Emergency Physician                   Consuelo Hebert MD  06/18/19 0906

## 2019-06-18 NOTE — PROGRESS NOTES
Patient ID: Jasmin [facility], 1970, [id number], 44 y.o. Referred by : HARRY Beckford CNP  Diagnosis:   Leukopenia  HISTORY OF PRESENT ILLNESS:    Hematologic History:  Mr. Odessa Anthony is a 24-year-old -American gentleman with past medical history of bipolar disorder, hypertension was seen during initial consultation visit for leukopenia. Patient reports he had a right axillary abscess in June of last year and was seen at ER and subsequently seen by primary care physician. He was noted to have mild low WBC on 6/4/18 with WBC count of 3.6. He was subsequently followed with repeat blood count and his most recent blood count on December 31 was a lower at 2.6 therefore he was referred to hematology for further evaluation. He denied any drenching night sweats, unintentional weight loss fever or chills. He denied any lymphadenopathy no difficulty swallowing. He does not smoke or drink alcohol. He works as a smith and active physically. He has a history of leukemia in his father. He denied any illicit drug use and high risk sexual behavior. He has negative HIV testing done last year. Interval history:  Patient is returning for follow-up visit and to discuss the results of labwork. He reports he was recently diagnosed with herpes simplex. He has been treated with antiviral.  He did not see a rheumatologist yet. He denied any drenching night sweats, unintentional weight loss fever or chills. He denied any lymphadenopathy no difficulty swallowing. During this visit patient's allergy, social, medical, surgical history and medications were reviewed and updated.     Allergies   Allergen Reactions    Latex Hives    Nsaids Itching     Tolerates baby asa    Pcn [Penicillins] Swelling     Swelling,hives, itching and shortness of breath     Current Outpatient Medications   Medication Sig Dispense Refill    EPINEPHrine (EPIPEN 2-LAURA) 0.3 MG/0.3ML SOAJ injection Inject 0.3 mLs into the muscle once as needed (severe allergic reaction) 2 each 1    lamoTRIgine (LAMICTAL) 25 MG tablet take 1 tab daily X 2 weeks, then 2 tabs daily in am      sildenafil (VIAGRA) 100 MG tablet TAKE 1 TABLET BY MOUTH ONE TIME A DAY AS NEEDED FOR ERECTILE DYSFUNCTION 15 tablet 5    sertraline (ZOLOFT) 50 MG tablet Take 50 mg by mouth daily       aspirin 81 MG EC tablet TAKE 1 TABLET BY MOUTH ONE TIME A DAY 90 tablet 1    predniSONE (DELTASONE) 20 MG tablet Take 3 tablets by mouth daily for 4 days 12 tablet 0    amLODIPine (NORVASC) 5 MG tablet Take 1 tablet by mouth daily 30 tablet 3    valACYclovir (VALTREX) 500 MG tablet Take 1 tablet by mouth daily Start within 72 hours of sx onset 90 tablet 1    valACYclovir (VALTREX) 1 g tablet Take 1 tablet by mouth 2 times daily for 7 days Start within 24 hours of sx 14 tablet 0     No current facility-administered medications for this visit. REVIEW OF SYSTEM:   Constitutional: No fever or chills. No night sweats, no weight loss   Eyes: No eye discharge, double vision, or eye pain   HEENT: negative for sore mouth, sore throat, hoarseness and voice change   Respiratory: negative for cough , sputum, dyspnea, wheezing, hemoptysis, chest pain   Cardiovascular: negative for chest pain, dyspnea, palpitations, orthopnea, PND   Gastrointestinal: negative for nausea, vomiting, diarrhea, constipation, abdominal pain, Dysphagia, hematemesis and hematochezia   Genitourinary: negative for frequency, dysuria, nocturia, urinary incontinence, and hematuria   Integument: negative for rash, skin lesions, bruises.    Hematologic/Lymphatic: negative for easy bruising, bleeding, lymphadenopathy, petechiae and swelling/edema   Endocrine: negative for heat or cold intolerance, tremor, weight changes, change in bowel habits and hair loss   Musculoskeletal: negative for myalgias, arthralgias, pain, joint swelling,and bone pain   Neurological: negative for headaches, dizziness, seizures, weakness, numbness  OBJECTIVE:         Vitals:    06/18/19 1309   BP: 132/79   Pulse: 82   Temp: 98.3 °F (36.8 °C)   PHYSICAL EXAM:   General appearance - well appearing, no in pain or distress   Mental status - alert and cooperative   Eyes - pupils equal and reactive, extraocular eye movements intact   Ears - bilateral TM's and external ear canals normal   Mouth - mucous membranes moist, pharynx normal without lesions   Neck - supple, no significant adenopathy   Lymphatics - no palpable lymphadenopathy, no hepatosplenomegaly   Chest - clear to auscultation, no wheezes, rales or rhonchi, symmetric air entry   Heart - normal rate, regular rhythm, normal S1, S2, no murmurs, rubs, clicks or gallops   Abdomen - soft, nontender, nondistended, no masses or organomegaly   Neurological - alert, oriented, normal speech, no focal findings or movement disorder noted   Musculoskeletal - no joint tenderness, deformity or swelling   Extremities - peripheral pulses normal, no pedal edema, no clubbing or cyanosis   Skin - normal coloration and turgor, no rashes, no suspicious skin lesions noted   LABORATORY DATA:     Lab Results   Component Value Date    WBC 4.9 06/18/2019    HGB 15.8 06/18/2019    HCT 45.2 06/18/2019    MCV 83.5 06/18/2019     06/18/2019    LYMPHOPCT 14 (L) 06/18/2019    RBC 5.42 06/18/2019    MCH 29.1 06/18/2019    MCHC 34.8 06/18/2019    RDW 13.5 06/18/2019    MONOPCT 2 06/18/2019    BASOPCT 1 06/18/2019    NEUTROABS 4.10 06/18/2019    LYMPHSABS 0.70 (L) 06/18/2019    MONOSABS 0.10 06/18/2019    EOSABS 0.00 06/18/2019    BASOSABS 0.00 06/18/2019       Chemistry        Component Value Date/Time     12/31/2018 0743    K 4.3 12/31/2018 0743     12/31/2018 0743    CO2 28 12/31/2018 0743    BUN 13 12/31/2018 0743    CREATININE 1.21 (H) 12/31/2018 0743        Component Value Date/Time    CALCIUM 9.1 12/31/2018 0743    ALKPHOS 55 09/07/2018 0644    AST 20 09/07/2018 0644    ALT 12 09/07/2018 0644 BILITOT 0.51 09/07/2018 0644        PATHOLOGY DATA:   FLOW CYTOMETRY REPORT  2/14/2019   INTERPRETATION   Peripheral blood:          Within normal parameters, no morphologic abnormalities.      Flow cytometric immunophenotyping of peripheral blood lymphocytes is negative for monoclonal B and aberrant T-lymphoid populations. IMAGING DATA:    Ultrasound abdomen 2/18/19  Impression   1. Mild hepatic steatosis. 2. No visualization of the spleen     ASSESSMENT:    Mr. Malachi Tyson is a 57-year-old -American gentleman with history of bipolar disorder seen for leukopenia. Patient has been taking Abilify for many years and I explained that Abilify can be associated with neutropenia. I do not suspect underlying bone marrow pathology as his hemoglobin and platelets are within normal limit and he does not have systemic B symptoms such as night sweats, fever chills or weight loss. He does have history of leukemia and his father therefore we will need to consider bone marrow biopsy if his counts do not improve. I think the leukopenia could be related to his medication Abilify however his counts are not very low and given the risk and benefit at this point I would not recommend stopping Abilify. If his counts continue to drop further then I will discuss with his psychiatrist change his Abilify to alternative option. I discussed the results of recent lab work which showed no morphologic abnormality. His flow cytometry is negative. He has positive KRISTI which is very mildly high and this could be nonspecific and advised him to follow with rheumatology  No indication for bone marrow biopsy at this point and his counts have resolved therefore we will see him as needed  Patient's questions were sought and answered to his satisfaction and he agreed to proceed with the plan.      PLAN:   Advised him to follow with rheumatology for positive KRISTI  His leukopenia has resolved  We will see him as needed  I advised him to follow-up with his primary care physician for CBC every 6 months and if his counts drop we will see him in future        Ankit Donald MD  Hematologist/Medical Oncologist    I spent more than 25 minutes examining, evaluating, reviewing data and counseling the patient. Greater than 50% of that time was spent face-to-face with the patient in counseling and coordinating her care. This note is created with the assistance of a speech recognition program.  While intending to generate a document that actually reflects the content of the visit, the document can still have some errors including those of syntax and sound a like substitutions which may escape proof reading. It such instances, actual meaning can be extrapolated by contextual diversion.     CC:  Ronak Cat, APRN - CNP

## 2019-07-25 ENCOUNTER — OFFICE VISIT (OUTPATIENT)
Dept: FAMILY MEDICINE CLINIC | Age: 49
End: 2019-07-25
Payer: MEDICARE

## 2019-07-25 VITALS
BODY MASS INDEX: 31.57 KG/M2 | OXYGEN SATURATION: 99 % | SYSTOLIC BLOOD PRESSURE: 122 MMHG | DIASTOLIC BLOOD PRESSURE: 80 MMHG | HEART RATE: 64 BPM | WEIGHT: 220 LBS

## 2019-07-25 DIAGNOSIS — I10 ESSENTIAL HYPERTENSION: Primary | ICD-10-CM

## 2019-07-25 PROCEDURE — 1036F TOBACCO NON-USER: CPT | Performed by: NURSE PRACTITIONER

## 2019-07-25 PROCEDURE — 99213 OFFICE O/P EST LOW 20 MIN: CPT | Performed by: NURSE PRACTITIONER

## 2019-07-25 PROCEDURE — G8427 DOCREV CUR MEDS BY ELIG CLIN: HCPCS | Performed by: NURSE PRACTITIONER

## 2019-07-25 PROCEDURE — G8417 CALC BMI ABV UP PARAM F/U: HCPCS | Performed by: NURSE PRACTITIONER

## 2019-07-25 PROCEDURE — 3017F COLORECTAL CA SCREEN DOC REV: CPT | Performed by: NURSE PRACTITIONER

## 2019-07-25 ASSESSMENT — ENCOUNTER SYMPTOMS: RESPIRATORY NEGATIVE: 1

## 2019-09-18 ENCOUNTER — OFFICE VISIT (OUTPATIENT)
Dept: FAMILY MEDICINE CLINIC | Age: 49
End: 2019-09-18
Payer: MEDICARE

## 2019-09-18 VITALS
DIASTOLIC BLOOD PRESSURE: 84 MMHG | BODY MASS INDEX: 32 KG/M2 | OXYGEN SATURATION: 99 % | SYSTOLIC BLOOD PRESSURE: 130 MMHG | WEIGHT: 223 LBS | HEART RATE: 74 BPM

## 2019-09-18 DIAGNOSIS — N52.9 ERECTILE DYSFUNCTION, UNSPECIFIED ERECTILE DYSFUNCTION TYPE: ICD-10-CM

## 2019-09-18 DIAGNOSIS — Z12.11 SCREENING FOR COLON CANCER: ICD-10-CM

## 2019-09-18 DIAGNOSIS — I10 ESSENTIAL HYPERTENSION: Primary | ICD-10-CM

## 2019-09-18 DIAGNOSIS — Z23 NEED FOR INFLUENZA VACCINATION: ICD-10-CM

## 2019-09-18 DIAGNOSIS — A60.02 HERPES GENITALIS IN MEN: ICD-10-CM

## 2019-09-18 PROCEDURE — 1036F TOBACCO NON-USER: CPT | Performed by: NURSE PRACTITIONER

## 2019-09-18 PROCEDURE — 99214 OFFICE O/P EST MOD 30 MIN: CPT | Performed by: NURSE PRACTITIONER

## 2019-09-18 PROCEDURE — 90686 IIV4 VACC NO PRSV 0.5 ML IM: CPT | Performed by: NURSE PRACTITIONER

## 2019-09-18 PROCEDURE — G0008 ADMIN INFLUENZA VIRUS VAC: HCPCS | Performed by: NURSE PRACTITIONER

## 2019-09-18 PROCEDURE — G8417 CALC BMI ABV UP PARAM F/U: HCPCS | Performed by: NURSE PRACTITIONER

## 2019-09-18 PROCEDURE — G8427 DOCREV CUR MEDS BY ELIG CLIN: HCPCS | Performed by: NURSE PRACTITIONER

## 2019-09-18 PROCEDURE — 3017F COLORECTAL CA SCREEN DOC REV: CPT | Performed by: NURSE PRACTITIONER

## 2019-09-18 RX ORDER — DEXTROAMPHETAMINE SACCHARATE, AMPHETAMINE ASPARTATE MONOHYDRATE, DEXTROAMPHETAMINE SULFATE AND AMPHETAMINE SULFATE 6.25; 6.25; 6.25; 6.25 MG/1; MG/1; MG/1; MG/1
1 CAPSULE, EXTENDED RELEASE ORAL
Qty: 30 CAPSULE | Status: CANCELLED | OUTPATIENT
Start: 2019-09-18

## 2019-09-18 RX ORDER — SILDENAFIL 100 MG/1
TABLET, FILM COATED ORAL
Qty: 15 TABLET | Refills: 5 | Status: SHIPPED | OUTPATIENT
Start: 2019-09-18 | End: 2020-03-23

## 2019-09-18 RX ORDER — DEXTROAMPHETAMINE SACCHARATE, AMPHETAMINE ASPARTATE MONOHYDRATE, DEXTROAMPHETAMINE SULFATE AND AMPHETAMINE SULFATE 6.25; 6.25; 6.25; 6.25 MG/1; MG/1; MG/1; MG/1
1 CAPSULE, EXTENDED RELEASE ORAL
COMMUNITY
Start: 2019-07-02 | End: 2020-04-14 | Stop reason: DRUGHIGH

## 2019-09-18 RX ORDER — AMLODIPINE BESYLATE 10 MG/1
10 TABLET ORAL DAILY
Qty: 90 TABLET | Refills: 1 | Status: SHIPPED | OUTPATIENT
Start: 2019-09-18 | End: 2020-03-13

## 2019-09-18 ASSESSMENT — ENCOUNTER SYMPTOMS
GASTROINTESTINAL NEGATIVE: 1
BLOOD IN STOOL: 0
RESPIRATORY NEGATIVE: 1
ANAL BLEEDING: 0

## 2019-09-18 NOTE — PROGRESS NOTES
[facility]  [facility] [address]  [facility] [address]  Dept: [phone]  Dept Fax: [phone]      Dustin Nichols is a 44 y.o. male who presents today for hismedical conditions/complaints as noted below. Dustin Nichols is c/o of 6 Month Follow-Up and Hypertension            HPI:      [facility] is here today for HTN check. HTN- BP is well controlled today. Compliant with taking medications, has been checking BP and doubled amlodipine due to finding his BP elevated 150/90. It has been better controlled at the 10 mg. Denies chest pain, dyspnea, edema, or HA. ED-using sildanifil. Working well for him. No negative side effects. Colon cancer screening-did FIT last year. Was negative. Is not certain what to do now. No fam hx of colon cancer, no blood in stool, no melena. Herpes-taking valtrex daily as suppression. May stop and see what happens. No further breakouts.      Hemoglobin A1C (%)   Date Value   06/04/2018 5.4             ( goal A1Cis < 7)   No results found for: LABMICR  LDL Cholesterol (mg/dL)   Date Value   09/07/2018 118   06/04/2018 116       (goal LDL is <100)   AST (U/L)   Date Value   09/07/2018 20     ALT (U/L)   Date Value   09/07/2018 12     BUN (mg/dL)   Date Value   12/31/2018 13     BP Readings from Last 3 Encounters:   09/18/19 130/84   07/25/19 122/80   06/18/19 132/79          (goal 120/80)    Past Medical History:   Diagnosis Date    B[facility] 1 disorder ([facility] [address]     Depression     prev on zoloft , abilify     Hypertension     Renal insufficiency 11/27/2018      Past Surgical History:   Procedure Laterality Date    BICEPS TENDON REPAIR Left        Family History   Problem Relation Age of Onset    Hypertension Mother     Heart Disease Mother     Diabetes Mother     High Cholesterol Mother     Cancer Father         leukemia    Hypertension Sister         Sisters are twins    Hypertension Brother     Heart Disease

## 2019-10-15 ENCOUNTER — TELEPHONE (OUTPATIENT)
Dept: FAMILY MEDICINE CLINIC | Age: 49
End: 2019-10-15

## 2019-10-15 ENCOUNTER — HOSPITAL ENCOUNTER (OUTPATIENT)
Age: 49
Setting detail: SPECIMEN
Discharge: HOME | End: 2019-10-15
Payer: MEDICARE

## 2019-10-15 DIAGNOSIS — Z20.2 POSSIBLE EXPOSURE TO STD: ICD-10-CM

## 2019-10-15 DIAGNOSIS — Z11.4 SCREENING FOR HIV (HUMAN IMMUNODEFICIENCY VIRUS): ICD-10-CM

## 2019-10-15 DIAGNOSIS — Z20.2 POSSIBLE EXPOSURE TO STD: Primary | ICD-10-CM

## 2019-10-16 DIAGNOSIS — Z12.11 SCREENING FOR COLON CANCER: ICD-10-CM

## 2019-10-16 LAB
C. TRACHOMATIS DNA ,URINE: NEGATIVE
HIV AG/AB: NONREACTIVE
N. GONORRHOEAE DNA, URINE: NEGATIVE
SPECIMEN DESCRIPTION: NORMAL

## 2019-12-02 ENCOUNTER — TELEPHONE (OUTPATIENT)
Dept: FAMILY MEDICINE CLINIC | Age: 49
End: 2019-12-02

## 2019-12-03 ENCOUNTER — TELEPHONE (OUTPATIENT)
Dept: FAMILY MEDICINE CLINIC | Age: 49
End: 2019-12-03

## 2019-12-03 DIAGNOSIS — T78.40XA ALLERGIC REACTION, INITIAL ENCOUNTER: Primary | ICD-10-CM

## 2019-12-12 DIAGNOSIS — A60.02 HERPES GENITALIS IN MEN: ICD-10-CM

## 2019-12-12 RX ORDER — VALACYCLOVIR HYDROCHLORIDE 500 MG/1
TABLET, FILM COATED ORAL
Qty: 90 TABLET | Refills: 1 | Status: SHIPPED | OUTPATIENT
Start: 2019-12-12 | End: 2020-12-01 | Stop reason: SDUPTHER

## 2020-01-09 ENCOUNTER — HOSPITAL ENCOUNTER (OUTPATIENT)
Age: 50
Discharge: HOME | End: 2020-01-09
Payer: MEDICARE

## 2020-01-09 LAB
ABSOLUTE BANDS #: 0.07 K/UL (ref 0–1)
ABSOLUTE EOS #: 0 K/UL (ref 0–0.4)
ABSOLUTE IMMATURE GRANULOCYTE: ABNORMAL K/UL (ref 0–0.3)
ABSOLUTE LYMPH #: 1.48 K/UL (ref 1–4.8)
ABSOLUTE MONO #: 0.52 K/UL (ref 0.1–1.3)
ALP BLD-CCNC: 58 U/L (ref 40–129)
ALT SERPL-CCNC: 14 U/L (ref 5–41)
AST SERPL-CCNC: 18 U/L
BANDS: 2 % (ref 0–10)
BASOPHILS # BLD: 0 % (ref 0–2)
BASOPHILS ABSOLUTE: 0 K/UL (ref 0–0.2)
BILIRUB SERPL-MCNC: 0.68 MG/DL (ref 0.3–1.2)
BILIRUBIN DIRECT: 0.17 MG/DL
BILIRUBIN, INDIRECT: 0.51 MG/DL (ref 0–1)
BUN BLDV-MCNC: 12 MG/DL (ref 6–20)
C-REACTIVE PROTEIN: 1 MG/L (ref 0–5)
CALCIUM SERPL-MCNC: 9.1 MG/DL (ref 8.6–10.4)
COMPLEMENT C4: 30 MG/DL (ref 10–40)
CREAT SERPL-MCNC: 1.17 MG/DL (ref 0.7–1.2)
DIFFERENTIAL TYPE: ABNORMAL
EOSINOPHILS RELATIVE PERCENT: 0 % (ref 0–4)
GFR AFRICAN AMERICAN: >60 ML/MIN
GFR NON-AFRICAN AMERICAN: >60 ML/MIN
GFR SERPL CREATININE-BSD FRML MDRD: NORMAL ML/MIN/{1.73_M2}
GFR SERPL CREATININE-BSD FRML MDRD: NORMAL ML/MIN/{1.73_M2}
GLUCOSE BLD-MCNC: 118 MG/DL (ref 70–99)
HCT VFR BLD CALC: 44.4 % (ref 41–53)
HEMOGLOBIN: 15.3 G/DL (ref 13.5–17.5)
IGE: 327 IU/ML
IMMATURE GRANULOCYTES: ABNORMAL %
LYMPHOCYTES # BLD: 40 % (ref 24–44)
MCH RBC QN AUTO: 29.7 PG (ref 26–34)
MCHC RBC AUTO-ENTMCNC: 34.4 G/DL (ref 31–37)
MCV RBC AUTO: 86.1 FL (ref 80–100)
MONOCYTES # BLD: 14 % (ref 1–7)
MORPHOLOGY: NORMAL
NRBC AUTOMATED: ABNORMAL PER 100 WBC
PDW BLD-RTO: 13.8 % (ref 11.5–14.9)
PHOSPHORUS: 2.4 MG/DL (ref 2.5–4.5)
PLATELET # BLD: 228 K/UL (ref 150–450)
PLATELET ESTIMATE: ABNORMAL
PMV BLD AUTO: 7.1 FL (ref 6–12)
RBC # BLD: 5.16 M/UL (ref 4.5–5.9)
RBC # BLD: ABNORMAL 10*6/UL
SEDIMENTATION RATE, ERYTHROCYTE: 2 MM (ref 0–15)
SEG NEUTROPHILS: 44 % (ref 36–66)
SEGMENTED NEUTROPHILS ABSOLUTE COUNT: 1.63 K/UL (ref 1.3–9.1)
TSH SERPL DL<=0.05 MIU/L-ACNC: 1.19 MIU/L (ref 0.3–5)
WBC # BLD: 3.7 K/UL (ref 3.5–11)
WBC # BLD: ABNORMAL 10*3/UL

## 2020-01-09 PROCEDURE — 82947 ASSAY GLUCOSE BLOOD QUANT: CPT

## 2020-01-09 PROCEDURE — 82310 ASSAY OF CALCIUM: CPT

## 2020-01-09 PROCEDURE — 85025 COMPLETE CBC W/AUTO DIFF WBC: CPT

## 2020-01-09 PROCEDURE — 86162 COMPLEMENT TOTAL (CH50): CPT

## 2020-01-09 PROCEDURE — 82785 ASSAY OF IGE: CPT

## 2020-01-09 PROCEDURE — 82565 ASSAY OF CREATININE: CPT

## 2020-01-09 PROCEDURE — 84520 ASSAY OF UREA NITROGEN: CPT

## 2020-01-09 PROCEDURE — 82248 BILIRUBIN DIRECT: CPT

## 2020-01-09 PROCEDURE — 84155 ASSAY OF PROTEIN SERUM: CPT

## 2020-01-09 PROCEDURE — 84165 PROTEIN E-PHORESIS SERUM: CPT

## 2020-01-09 PROCEDURE — 84075 ASSAY ALKALINE PHOSPHATASE: CPT

## 2020-01-09 PROCEDURE — 84460 ALANINE AMINO (ALT) (SGPT): CPT

## 2020-01-09 PROCEDURE — 86038 ANTINUCLEAR ANTIBODIES: CPT

## 2020-01-09 PROCEDURE — 36415 COLL VENOUS BLD VENIPUNCTURE: CPT

## 2020-01-09 PROCEDURE — 86140 C-REACTIVE PROTEIN: CPT

## 2020-01-09 PROCEDURE — 84443 ASSAY THYROID STIM HORMONE: CPT

## 2020-01-09 PROCEDURE — 84450 TRANSFERASE (AST) (SGOT): CPT

## 2020-01-09 PROCEDURE — 82247 BILIRUBIN TOTAL: CPT

## 2020-01-09 PROCEDURE — 84100 ASSAY OF PHOSPHORUS: CPT

## 2020-01-09 PROCEDURE — 85651 RBC SED RATE NONAUTOMATED: CPT

## 2020-01-09 PROCEDURE — 86160 COMPLEMENT ANTIGEN: CPT

## 2020-01-10 LAB
ALBUMIN (CALCULATED): 4.8 G/DL (ref 3.2–5.2)
ALBUMIN PERCENT: 67 % (ref 45–65)
ALPHA 1 PERCENT: 2 % (ref 3–6)
ALPHA 2 PERCENT: 9 % (ref 6–13)
ALPHA-1-GLOBULIN: 0.1 G/DL (ref 0.1–0.4)
ALPHA-2-GLOBULIN: 0.6 G/DL (ref 0.5–0.9)
BETA GLOBULIN: 0.7 G/DL (ref 0.5–1.1)
BETA PERCENT: 10 % (ref 11–19)
GAMMA GLOBULIN %: 13 % (ref 9–20)
GAMMA GLOBULIN: 1 G/DL (ref 0.5–1.5)
PATHOLOGIST: ABNORMAL
PROTEIN ELECTROPHORESIS, SERUM: ABNORMAL
TOTAL PROT. SUM,%: 101 % (ref 98–102)
TOTAL PROT. SUM: 7.2 G/DL (ref 6.3–8.2)
TOTAL PROTEIN: 7.2 G/DL (ref 6.4–8.3)

## 2020-01-12 LAB — COMPLEMENT TOTAL (CH50): 111 CAE UNITS (ref 60–144)

## 2020-01-13 LAB — ANTI-NUCLEAR ANTIBODY (ANA): NEGATIVE

## 2020-03-13 NOTE — TELEPHONE ENCOUNTER
Last visit: 9/18/19  Last Med refill: 9/18/19  Does patient have enough medication for 72 hours: Yes    Next Visit Date:  Future Appointments   Date Time Provider Shruti Moore   3/18/2020  8:30 AM Inell Peabody, APRN - CNP [facility] [address] Maintenance   Topic Date Due    Shingles Vaccine (1 of 2) 03/21/2018    Annual Wellness Visit (AWV)  05/29/2019    Potassium monitoring  12/31/2019    Creatinine monitoring  01/09/2021    Diabetes screen  06/04/2021    Colon cancer screen fecal DNA test (Cologuard)  10/11/2022    Lipid screen  09/07/2023    DTaP/Tdap/Td vaccine (2 - Td) 05/29/2028    Flu vaccine  Completed    HIV screen  Completed    Hepatitis A vaccine  Aged Out    Hepatitis B vaccine  Aged Out    Hib vaccine  Aged Out    Meningococcal (ACWY) vaccine  Aged Out    Pneumococcal 0-64 years Vaccine  Aged Out       Hemoglobin A1C (%)   Date Value   06/04/2018 5.4             ( goal A1C is < 7)   No results found for: LABMICR  LDL Cholesterol (mg/dL)   Date Value   09/07/2018 118   06/04/2018 116       (goal LDL is <100)   AST (U/L)   Date Value   01/09/2020 18     ALT (U/L)   Date Value   01/09/2020 14     BUN (mg/dL)   Date Value   01/09/2020 12     BP Readings from Last 3 Encounters:   09/18/19 130/84   07/25/19 122/80   06/18/19 132/79          (goal 120/80)    All Future Testing planned in [facility]              Patient Active Problem List:     Family history of diabetes mellitus     Essential hypertension     Bipolar 1 disorder ([facility] [address]     Former smoker     Renal insufficiency     Leukopenia     ED (erectile dysfunction)     Problems related to release from [facility]     Depression     Class 1 obesity with serious comorbidity and body mass index (BMI) of 31.0 to 31.9 in adult     KRISTI positive     Herpes genitalis in men

## 2020-03-14 RX ORDER — AMLODIPINE BESYLATE 10 MG/1
TABLET ORAL
Qty: 90 TABLET | Refills: 1 | Status: SHIPPED | OUTPATIENT
Start: 2020-03-14 | End: 2020-12-01 | Stop reason: SDUPTHER

## 2020-03-23 NOTE — TELEPHONE ENCOUNTER
Last visit: 9/18/19  Last Med refill: 9/18/19  Does patient have enough medication for 72 hours: No:     Next Visit Date:  Future Appointments   Date Time Provider Shruti Moore   4/14/2020  8:50 AM HARRY Barriga CNP [facility] [address] Maintenance   Topic Date Due    Shingles Vaccine (1 of 2) 03/21/2018    Annual Wellness Visit (AWV)  05/29/2019    Potassium monitoring  12/31/2019    Creatinine monitoring  01/09/2021    Diabetes screen  06/04/2021    Colon cancer screen fecal DNA test (Cologuard)  10/11/2022    Lipid screen  09/07/2023    DTaP/Tdap/Td vaccine (2 - Td) 05/29/2028    Flu vaccine  Completed    HIV screen  Completed    Hepatitis A vaccine  Aged Out    Hepatitis B vaccine  Aged Out    Hib vaccine  Aged Out    Meningococcal (ACWY) vaccine  Aged Out    Pneumococcal 0-64 years Vaccine  Aged Out       Hemoglobin A1C (%)   Date Value   06/04/2018 5.4             ( goal A1C is < 7)   No results found for: LABMICR  LDL Cholesterol (mg/dL)   Date Value   09/07/2018 118   06/04/2018 116       (goal LDL is <100)   AST (U/L)   Date Value   01/09/2020 18     ALT (U/L)   Date Value   01/09/2020 14     BUN (mg/dL)   Date Value   01/09/2020 12     BP Readings from Last 3 Encounters:   09/18/19 130/84   07/25/19 122/80   06/18/19 132/79          (goal 120/80)    All Future Testing planned in [facility]              Patient Active Problem List:     Family history of diabetes mellitus     Essential hypertension     Bipolar 1 disorder ([facility] [address]     Former smoker     Renal insufficiency     Leukopenia     ED (erectile dysfunction)     Problems related to release from long-term     Depression     Class 1 obesity with serious comorbidity and body mass index (BMI) of 31.0 to 31.9 in adult     KRISTI positive     Herpes genitalis in men

## 2020-03-25 RX ORDER — SILDENAFIL 100 MG/1
TABLET, FILM COATED ORAL
Qty: 15 TABLET | Refills: 5 | Status: SHIPPED | OUTPATIENT
Start: 2020-03-25 | End: 2020-12-01 | Stop reason: SDUPTHER

## 2020-04-14 ENCOUNTER — TELEMEDICINE (OUTPATIENT)
Dept: FAMILY MEDICINE CLINIC | Age: 50
End: 2020-04-14
Payer: MEDICARE

## 2020-04-14 VITALS
SYSTOLIC BLOOD PRESSURE: 134 MMHG | HEART RATE: 70 BPM | BODY MASS INDEX: 31.57 KG/M2 | WEIGHT: 220 LBS | DIASTOLIC BLOOD PRESSURE: 81 MMHG

## 2020-04-14 PROBLEM — F31.75 BIPOLAR DISORDER, IN PARTIAL REMISSION, MOST RECENT EPISODE DEPRESSED (HCC): Status: ACTIVE | Noted: 2020-04-14

## 2020-04-14 PROBLEM — T78.3XXA ANGIOEDEMA OF LIPS: Status: ACTIVE | Noted: 2020-04-14

## 2020-04-14 PROBLEM — F90.9 ATTENTION-DEFICIT HYPERACTIVITY DISORDER, UNSPECIFIED TYPE: Status: ACTIVE | Noted: 2020-04-14

## 2020-04-14 PROBLEM — L50.9 URTICARIA: Status: ACTIVE | Noted: 2020-04-14

## 2020-04-14 PROCEDURE — 1036F TOBACCO NON-USER: CPT | Performed by: NURSE PRACTITIONER

## 2020-04-14 PROCEDURE — 3017F COLORECTAL CA SCREEN DOC REV: CPT | Performed by: NURSE PRACTITIONER

## 2020-04-14 PROCEDURE — G8427 DOCREV CUR MEDS BY ELIG CLIN: HCPCS | Performed by: NURSE PRACTITIONER

## 2020-04-14 PROCEDURE — 99213 OFFICE O/P EST LOW 20 MIN: CPT | Performed by: NURSE PRACTITIONER

## 2020-04-14 PROCEDURE — G8417 CALC BMI ABV UP PARAM F/U: HCPCS | Performed by: NURSE PRACTITIONER

## 2020-04-14 RX ORDER — MONTELUKAST SODIUM 10 MG/1
1 TABLET ORAL DAILY
COMMUNITY
Start: 2020-02-03 | End: 2020-04-14 | Stop reason: SDUPTHER

## 2020-04-14 RX ORDER — MONTELUKAST SODIUM 10 MG/1
10 TABLET ORAL DAILY
Qty: 90 TABLET | Refills: 1 | Status: SHIPPED | OUTPATIENT
Start: 2020-04-14 | End: 2020-12-01

## 2020-04-14 RX ORDER — FEXOFENADINE HCL 180 MG/1
180 TABLET ORAL 2 TIMES DAILY
COMMUNITY
End: 2020-12-01

## 2020-04-14 RX ORDER — DEXTROAMPHETAMINE SACCHARATE, AMPHETAMINE ASPARTATE MONOHYDRATE, DEXTROAMPHETAMINE SULFATE AND AMPHETAMINE SULFATE 5; 5; 5; 5 MG/1; MG/1; MG/1; MG/1
2 CAPSULE, EXTENDED RELEASE ORAL DAILY
COMMUNITY
Start: 2020-04-08 | End: 2022-09-19 | Stop reason: SDUPTHER

## 2020-04-14 ASSESSMENT — ENCOUNTER SYMPTOMS
SHORTNESS OF BREATH: 0
RESPIRATORY NEGATIVE: 1
GASTROINTESTINAL NEGATIVE: 1
BACK PAIN: 0
COUGH: 0

## 2020-04-14 NOTE — PROGRESS NOTES
Pt is doing VV for a 6 month follow up for HTN and refills. He states is taking Singulair. It was prescribed by [facility] but he states is no longer seeing [facility] and is asking if Sharmin Rodriguez can fill for him.
synchronous discussion virtually to substitute for in-person clinic visit. Patient and provider were located at their individual homes. --HARRY Foster CNP on 4/14/2020 at 9:09 AM    An electronic signature was used to authenticate this note.

## 2020-04-30 RX ORDER — AMLODIPINE BESYLATE 10 MG/1
TABLET ORAL
Qty: 90 TABLET | Refills: 0 | OUTPATIENT
Start: 2020-04-30

## 2020-05-04 RX ORDER — AMLODIPINE BESYLATE 10 MG/1
TABLET ORAL
Qty: 90 TABLET | Refills: 0 | OUTPATIENT
Start: 2020-05-04

## 2020-06-03 RX ORDER — AMLODIPINE BESYLATE 10 MG/1
TABLET ORAL
Qty: 90 TABLET | Refills: 0 | OUTPATIENT
Start: 2020-06-03

## 2020-12-01 ENCOUNTER — OFFICE VISIT (OUTPATIENT)
Dept: FAMILY MEDICINE CLINIC | Age: 50
End: 2020-12-01
Payer: MEDICARE

## 2020-12-01 ENCOUNTER — HOSPITAL ENCOUNTER (OUTPATIENT)
Age: 50
Setting detail: SPECIMEN
Discharge: HOME | End: 2020-12-01
Payer: MEDICARE

## 2020-12-01 VITALS
OXYGEN SATURATION: 98 % | SYSTOLIC BLOOD PRESSURE: 132 MMHG | DIASTOLIC BLOOD PRESSURE: 86 MMHG | WEIGHT: 223 LBS | HEART RATE: 81 BPM | BODY MASS INDEX: 31.92 KG/M2 | TEMPERATURE: 97.3 F | HEIGHT: 70 IN

## 2020-12-01 PROCEDURE — G8417 CALC BMI ABV UP PARAM F/U: HCPCS | Performed by: NURSE PRACTITIONER

## 2020-12-01 PROCEDURE — G8484 FLU IMMUNIZE NO ADMIN: HCPCS | Performed by: NURSE PRACTITIONER

## 2020-12-01 PROCEDURE — 3017F COLORECTAL CA SCREEN DOC REV: CPT | Performed by: NURSE PRACTITIONER

## 2020-12-01 PROCEDURE — 99214 OFFICE O/P EST MOD 30 MIN: CPT | Performed by: NURSE PRACTITIONER

## 2020-12-01 PROCEDURE — 1036F TOBACCO NON-USER: CPT | Performed by: NURSE PRACTITIONER

## 2020-12-01 PROCEDURE — G8427 DOCREV CUR MEDS BY ELIG CLIN: HCPCS | Performed by: NURSE PRACTITIONER

## 2020-12-01 RX ORDER — AMLODIPINE BESYLATE 10 MG/1
TABLET ORAL
Qty: 90 TABLET | Refills: 1 | Status: SHIPPED | OUTPATIENT
Start: 2020-12-01 | End: 2021-03-01 | Stop reason: SDUPTHER

## 2020-12-01 RX ORDER — VALACYCLOVIR HYDROCHLORIDE 500 MG/1
TABLET, FILM COATED ORAL
Qty: 90 TABLET | Refills: 0 | Status: SHIPPED | OUTPATIENT
Start: 2020-12-01 | End: 2021-03-01 | Stop reason: SDUPTHER

## 2020-12-01 RX ORDER — SILDENAFIL 100 MG/1
TABLET, FILM COATED ORAL
Qty: 15 TABLET | Refills: 5 | Status: SHIPPED | OUTPATIENT
Start: 2020-12-01 | End: 2021-03-01 | Stop reason: SDUPTHER

## 2020-12-01 ASSESSMENT — ENCOUNTER SYMPTOMS
BACK PAIN: 0
RESPIRATORY NEGATIVE: 1
SHORTNESS OF BREATH: 0
GASTROINTESTINAL NEGATIVE: 1
COUGH: 0

## 2020-12-01 NOTE — PROGRESS NOTES
[address]  [facility] 2.  [address] [address]  Dept: [phone]  Dept Fax: [phone]      Nehal Aceves is a 44 y.o. male who presents today for hismedical conditions/complaints as noted below. Nehal Aceves is c/o of Hypertension            HPI:      HPI  Jannetta Claude is here today for HTN follow up. HTN- BP is well controlled today. Compliant with taking medications, amlodipine. Denies chest pain, dyspnea, edema, or HA. Genital herpes-is not taking valtrex daily any longer. Using prn- BID x 3 days for outbreak. Stopped taking last year when he was having some allergic reactions and never started taking again. Would like to have std testing. Denies any symptoms. Has not had any further allergic/angioedema symptoms. Has stopped taking allergy medication. Elevated glucose-sugar in Jan was 118. Diabetes runs in family. Weight is stable. Has been eating more \"comfort foods\". Denies polyruia or polydipsia. He did get influenza in [place] where he has been staying with a friend. Cannot recall what store it was at. ED-viagra is successful. Denies any negative side effects.      Hemoglobin A1C (%)   Date Value   06/04/2018 5.4             ( goal A1Cis < 7)   No results found for: LABMICR  LDL Cholesterol (mg/dL)   Date Value   09/07/2018 118   06/04/2018 116       (goal LDL is <100)   AST (U/L)   Date Value   01/09/2020 18     ALT (U/L)   Date Value   01/09/2020 14     BUN (mg/dL)   Date Value   01/09/2020 12     BP Readings from Last 3 Encounters:   12/01/20 132/86   04/14/20 134/81   09/18/19 130/84          (goal 120/80)    Past Medical History:   Diagnosis Date    Bipolar 1 disorder ([facility]     Depression     prev on zoloft , abilify     Hypertension     Renal insufficiency 11/27/2018      Past Surgical History:   Procedure Laterality Date    BICEPS TENDON REPAIR Left        Family History   Problem Relation Age of Onset    Hypertension Mother     Heart Disease Mother     Diabetes Mother     High Cholesterol Mother     Cancer Father         leukemia    Hypertension Sister         Sisters are twins    Hypertension Brother     Heart Disease Brother           Social History     Tobacco Use    Smoking status: Former Smoker     Packs/day: 0.50     Years: 15.00     Pack years: 7.50     Types: Cigarettes    Smokeless tobacco: Never Used    Tobacco comment: quit 2015   Substance Use Topics    Alcohol use: No     Comment: none since 2010         Current Outpatient Medications   Medication Sig Dispense Refill    amLODIPine (NORVASC) 10 MG tablet TAKE 1 TABLET BY MOUTH ONE TIME A DAY 90 tablet 1    sildenafil (VIAGRA) 100 MG tablet TAKE 1 TABLET BY MOUTH ONE TIME A DAY AS NEEDED FOR ERECTILE DYSFUNCTION 15 tablet 5    valACYclovir (VALTREX) 500 MG tablet TAKE 1 TABLET BY MOUTH ONE TIME A DAY. start within 72 hours of symptom onset 90 tablet 0    amphetamine-dextroamphetamine (ADDERALL XR) 20 MG extended release capsule Take 2 capsules by mouth daily.  lamoTRIgine (LAMICTAL) 25 MG tablet take 1 tab daily X 2 weeks, then 2 tabs daily in am      sertraline (ZOLOFT) 50 MG tablet Take 50 mg by mouth daily       aspirin 81 MG EC tablet TAKE 1 TABLET BY MOUTH ONE TIME A DAY 90 tablet 1    EPINEPHrine (EPIPEN 2-LAURA) 0.3 MG/0.3ML SOAJ injection Inject 0.3 mLs into the muscle once as needed (severe allergic reaction) 2 each 1     No current facility-administered medications for this visit.       Allergies   Allergen Reactions    Latex Hives    Lisinopril     Nsaids Itching     Tolerates baby asa    Pcn [Penicillins] Swelling     Swelling,hives, itching and shortness of breath       Health Maintenance   Topic Date Due    Shingles Vaccine (1 of 2) 03/21/2018    Annual Wellness Visit (AWV)  05/29/2019    Potassium monitoring  12/31/2019    Flu vaccine (1) 06/30/2021 (Originally 9/1/2020)    Creatinine monitoring 01/09/2021    Diabetes screen  06/04/2021    Colon cancer screen fecal DNA test (Cologuard)  10/11/2022    Lipid screen  09/07/2023    DTaP/Tdap/Td vaccine (2 - Td) 05/29/2028    HIV screen  Completed    Hepatitis A vaccine  Aged Out    Hepatitis B vaccine  Aged Out    Hib vaccine  Aged Out    Meningococcal (ACWY) vaccine  Aged Out    Pneumococcal 0-64 years Vaccine  Aged Out          Review of Systems   Constitutional: Negative. HENT: Negative. Respiratory: Negative. Negative for cough and shortness of breath. Cardiovascular: Negative. Negative for chest pain. Gastrointestinal: Negative. Endocrine: Negative. Genitourinary:        Ed     Musculoskeletal: Negative. Negative for arthralgias and back pain. Skin: Negative. Allergic/Immunologic: Positive for environmental allergies. Neurological: Negative. Negative for seizures. Psychiatric/Behavioral: Negative. Objective:     /86   Pulse 81   Temp 97.3 °F (36.3 °C) (Temporal)   Ht 5' 10\" (1.778 m)   Wt 223 lb (101.2 kg)   SpO2 98%   BMI 32.00 kg/m²   Physical Exam  Vitals signs reviewed. Constitutional:       Appearance: Normal appearance. He is well-developed. Eyes:      Conjunctiva/sclera: Conjunctivae normal.   Cardiovascular:      Rate and Rhythm: Normal rate and regular rhythm. Heart sounds: Normal heart sounds. Pulmonary:      Effort: Pulmonary effort is normal.      Breath sounds: Normal breath sounds. Neurological:      Mental Status: He is alert and oriented to person, place, and time. Psychiatric:         Behavior: Behavior normal.         Thought Content: Thought content normal.         Judgment: Judgment normal.           Assessment:      1. Essential hypertension    2. Erectile dysfunction, unspecified erectile dysfunction type    3. Impaired glucose tolerance    4. Herpes genitalis in men    5. Screening for STD (sexually transmitted disease)    6.  Screening for deficiency anemia 7. Screening for hyperlipidemia                     Plan:      Orders Placed This Encounter   Procedures    Chlamydia/GC DNA, Urine     Standing Status:   Future     Standing Expiration Date:   12/1/2021    Basic Metabolic Panel     Standing Status:   Future     Standing Expiration Date:   12/1/2021    CBC     Standing Status:   Future     Standing Expiration Date:   12/1/2021    Lipid Panel     Standing Status:   Future     Standing Expiration Date:   12/1/2021     Order Specific Question:   Is Patient Fasting?/# of Hours     Answer:   yes    Hemoglobin A1C     Standing Status:   Future     Standing Expiration Date:   12/1/2021    HIV Screen     Standing Status:   Future     Standing Expiration Date:   12/1/2021     1. Essential hypertension  well controlled, continue plan of care    - amLODIPine (NORVASC) 10 MG tablet; TAKE 1 TABLET BY MOUTH ONE TIME A DAY  Dispense: 90 tablet; Refill: 1  - Basic Metabolic Panel; Future    2. Erectile dysfunction, unspecified erectile dysfunction type  Working well  - sildenafil (VIAGRA) 100 MG tablet; TAKE 1 TABLET BY MOUTH ONE TIME A DAY AS NEEDED FOR ERECTILE DYSFUNCTION  Dispense: 15 tablet; Refill: 5    3. Impaired glucose tolerance    - Hemoglobin A1C; Future    4. Herpes genitalis in men    - valACYclovir (VALTREX) 500 MG tablet; TAKE 1 TABLET BY MOUTH ONE TIME A DAY. start within 72 hours of symptom onset  Dispense: 90 tablet; Refill: 0    5. Screening for STD (sexually transmitted disease)    - Chlamydia/GC DNA, Urine; Future  - HIV Screen; Future    6. Screening for deficiency anemia    - CBC; Future    7. Screening for hyperlipidemia    - Lipid Panel; Future      No follow-ups on file. Patient given educational materials - see patientinstructions. Discussed use, benefit, and side effects of prescribed medications. All patient questions answered. Pt voiced understanding. Reviewed health maintenance. Instructed to continue current medications, diet and exercise. Patient agreedwith treatment plan. Follow up as directed.      Electronically signed by HARRY Vasquez CNP on 12/1/2020

## 2020-12-01 NOTE — PROGRESS NOTES
Visit Information    Have you changed or started any medications since your last visit including any over-the-counter medicines, vitamins, or herbal medicines? no   Are you having any side effects from any of your medications? -  no  Have you stopped taking any of your medications? Is so, why? -  no    Have you seen any other physician or provider since your last visit? No  Have you had any other diagnostic tests since your last visit? No  Have you been seen in the emergency room and/or had an admission to a hospital since we last saw you? No  Have you had your routine dental cleaning in the past 6 months? no    Have you activated your [facility] account? If not, what are your barriers?  Yes     Patient Care Team:  HARRY Aceves CNP as PCP - General (Family Nurse Practitioner)  HARRY Aceves CNP as PCP - [facility]  Vivian Reyes MD as Consulting Physician (Hematology and Oncology)  HARRY Milner CNP as Consulting Physician (Nurse Practitioner)    Medical History Review  Past Medical, Family, and Social History reviewed and does contribute to the patient presenting condition    Health Maintenance   Topic Date Due    Shingles Vaccine (1 of 2) 03/21/2018    Annual Wellness Visit (AWV)  05/29/2019    Potassium monitoring  12/31/2019    Flu vaccine (1) 09/01/2020    Creatinine monitoring  01/09/2021    Diabetes screen  06/04/2021    Colon cancer screen fecal DNA test (Cologuard)  10/11/2022    Lipid screen  09/07/2023    DTaP/Tdap/Td vaccine (2 - Td) 05/29/2028    HIV screen  Completed    Hepatitis A vaccine  Aged Out    Hepatitis B vaccine  Aged Out    Hib vaccine  Aged Out    Meningococcal (ACWY) vaccine  Aged Out    Pneumococcal 0-64 years Vaccine  Aged Out

## 2020-12-03 ENCOUNTER — HOSPITAL ENCOUNTER (OUTPATIENT)
Age: 50
Discharge: HOME | End: 2020-12-03
Payer: MEDICARE

## 2020-12-03 LAB
ANION GAP SERPL CALCULATED.3IONS-SCNC: 11 MMOL/L (ref 9–17)
BUN BLDV-MCNC: 13 MG/DL (ref 6–20)
BUN/CREAT BLD: ABNORMAL (ref 9–20)
C. TRACHOMATIS DNA ,URINE: NEGATIVE
CALCIUM SERPL-MCNC: 9.3 MG/DL (ref 8.6–10.4)
CHLORIDE BLD-SCNC: 106 MMOL/L (ref 98–107)
CHOLESTEROL/HDL RATIO: 3.3
CHOLESTEROL: 174 MG/DL
CO2: 26 MMOL/L (ref 20–31)
CREAT SERPL-MCNC: 1.09 MG/DL (ref 0.7–1.2)
ESTIMATED AVERAGE GLUCOSE: 117 MG/DL
GFR AFRICAN AMERICAN: >60 ML/MIN
GFR NON-AFRICAN AMERICAN: >60 ML/MIN
GFR SERPL CREATININE-BSD FRML MDRD: ABNORMAL ML/MIN/{1.73_M2}
GFR SERPL CREATININE-BSD FRML MDRD: ABNORMAL ML/MIN/{1.73_M2}
GLUCOSE BLD-MCNC: 103 MG/DL (ref 70–99)
HBA1C MFR BLD: 5.7 % (ref 4–6)
HCT VFR BLD CALC: 45.2 % (ref 41–53)
HDLC SERPL-MCNC: 53 MG/DL
HEMOGLOBIN: 15.4 G/DL (ref 13.5–17.5)
HIV AG/AB: NONREACTIVE
LDL CHOLESTEROL: 109 MG/DL (ref 0–130)
MCH RBC QN AUTO: 28.9 PG (ref 26–34)
MCHC RBC AUTO-ENTMCNC: 34.1 G/DL (ref 31–37)
MCV RBC AUTO: 84.9 FL (ref 80–100)
N. GONORRHOEAE DNA, URINE: NEGATIVE
NRBC AUTOMATED: NORMAL PER 100 WBC
PDW BLD-RTO: 13.6 % (ref 11.5–14.9)
PLATELET # BLD: 208 K/UL (ref 150–450)
PMV BLD AUTO: 7.1 FL (ref 6–12)
POTASSIUM SERPL-SCNC: 4.4 MMOL/L (ref 3.7–5.3)
RBC # BLD: 5.32 M/UL (ref 4.5–5.9)
SODIUM BLD-SCNC: 143 MMOL/L (ref 135–144)
SPECIMEN DESCRIPTION: NORMAL
TRIGL SERPL-MCNC: 58 MG/DL
VLDLC SERPL CALC-MCNC: NORMAL MG/DL (ref 1–30)
WBC # BLD: 3.8 K/UL (ref 3.5–11)

## 2020-12-03 PROCEDURE — 87491 CHLMYD TRACH DNA AMP PROBE: CPT

## 2020-12-03 PROCEDURE — 36415 COLL VENOUS BLD VENIPUNCTURE: CPT

## 2020-12-03 PROCEDURE — 80048 BASIC METABOLIC PNL TOTAL CA: CPT

## 2020-12-03 PROCEDURE — 83036 HEMOGLOBIN GLYCOSYLATED A1C: CPT

## 2020-12-03 PROCEDURE — 80061 LIPID PANEL: CPT

## 2020-12-03 PROCEDURE — 87591 N.GONORRHOEAE DNA AMP PROB: CPT

## 2020-12-03 PROCEDURE — 85027 COMPLETE CBC AUTOMATED: CPT

## 2020-12-03 PROCEDURE — 87389 HIV-1 AG W/HIV-1&-2 AB AG IA: CPT

## 2020-12-04 LAB
C. TRACHOMATIS DNA ,URINE: NEGATIVE
N. GONORRHOEAE DNA, URINE: NEGATIVE
SPECIMEN DESCRIPTION: NORMAL

## 2021-02-26 ASSESSMENT — PATIENT HEALTH QUESTIONNAIRE - PHQ9
SUM OF ALL RESPONSES TO PHQ QUESTIONS 1-9: 0
2. FEELING DOWN, DEPRESSED OR HOPELESS: 0
1. LITTLE INTEREST OR PLEASURE IN DOING THINGS: 0

## 2021-02-26 NOTE — PROGRESS NOTES
Visit Information    Have you changed or started any medications since your last visit including any over-the-counter medicines, vitamins, or herbal medicines? no   Are you having any side effects from any of your medications? -  no  Have you stopped taking any of your medications? Is so, why? -  no    Have you seen any other physician or provider since your last visit? No  Have you had any other diagnostic tests since your last visit? No  Have you been seen in the emergency room and/or had an admission to a hospital since we last saw you? No  Have you had your routine dental cleaning in the past 6 months? no    Have you activated your [employer] account? If not, what are your barriers?  Yes     Patient Care Team:  HARRY Martínze CNP as PCP - General (Family Nurse Practitioner)  HARRY Martínez CNP as PCP - [facility]  Emelina Burton MD as Consulting Physician (Hematology and Oncology)  HARRY Christianson CNP as Consulting Physician (Nurse Practitioner)    Medical History Review  Past Medical, Family, and Social History reviewed and does contribute to the patient presenting condition    Health Maintenance   Topic Date Due    Shingles Vaccine (1 of 2) 03/21/2018    Annual Wellness Visit (AWV)  05/29/2019    Flu vaccine (1) 06/30/2021 (Originally 9/1/2020)    A1C test (Diabetic or Prediabetic)  12/03/2021    Potassium monitoring  12/03/2021    Creatinine monitoring  12/03/2021    Colon cancer screen fecal DNA test (Cologuard)  10/11/2022    Lipid screen  12/03/2025    DTaP/Tdap/Td vaccine (2 - Td) 05/29/2028    Hepatitis C screen  Completed    HIV screen  Completed    Hepatitis A vaccine  Aged Out    Hepatitis B vaccine  Aged Out    Hib vaccine  Aged Out    Meningococcal (ACWY) vaccine  Aged Out    Pneumococcal 0-64 years Vaccine  Aged Out

## 2021-03-01 ENCOUNTER — TELEMEDICINE (OUTPATIENT)
Dept: FAMILY MEDICINE CLINIC | Age: 51
End: 2021-03-01
Payer: MEDICARE

## 2021-03-01 DIAGNOSIS — I10 ESSENTIAL HYPERTENSION: Primary | ICD-10-CM

## 2021-03-01 DIAGNOSIS — R73.03 PREDIABETES: ICD-10-CM

## 2021-03-01 DIAGNOSIS — A60.02 HERPES GENITALIS IN MEN: ICD-10-CM

## 2021-03-01 DIAGNOSIS — N52.9 ERECTILE DYSFUNCTION, UNSPECIFIED ERECTILE DYSFUNCTION TYPE: ICD-10-CM

## 2021-03-01 PROCEDURE — 3017F COLORECTAL CA SCREEN DOC REV: CPT | Performed by: NURSE PRACTITIONER

## 2021-03-01 PROCEDURE — G8427 DOCREV CUR MEDS BY ELIG CLIN: HCPCS | Performed by: NURSE PRACTITIONER

## 2021-03-01 PROCEDURE — 99214 OFFICE O/P EST MOD 30 MIN: CPT | Performed by: NURSE PRACTITIONER

## 2021-03-01 RX ORDER — AMLODIPINE BESYLATE 10 MG/1
TABLET ORAL
Qty: 90 TABLET | Refills: 1 | Status: SHIPPED | OUTPATIENT
Start: 2021-03-01 | End: 2021-11-15 | Stop reason: SDUPTHER

## 2021-03-01 RX ORDER — SILDENAFIL 100 MG/1
TABLET, FILM COATED ORAL
Qty: 15 TABLET | Refills: 5 | Status: SHIPPED | OUTPATIENT
Start: 2021-03-01 | End: 2021-11-01 | Stop reason: SDUPTHER

## 2021-03-01 RX ORDER — VALACYCLOVIR HYDROCHLORIDE 500 MG/1
TABLET, FILM COATED ORAL
Qty: 90 TABLET | Refills: 1 | Status: SHIPPED | OUTPATIENT
Start: 2021-03-01 | End: 2021-11-15 | Stop reason: SDUPTHER

## 2021-03-01 RX ORDER — ASPIRIN 81 MG/1
TABLET ORAL
Qty: 90 TABLET | Refills: 3 | Status: SHIPPED | OUTPATIENT
Start: 2021-03-01 | End: 2022-05-25

## 2021-03-01 ASSESSMENT — ENCOUNTER SYMPTOMS
SHORTNESS OF BREATH: 0
GASTROINTESTINAL NEGATIVE: 1
BACK PAIN: 0
RESPIRATORY NEGATIVE: 1
COUGH: 0

## 2021-03-01 NOTE — PROGRESS NOTES
TELEHEALTH EVALUATION -- Audio/Visual (During XDQFV-07 public health emergency)      SUBJECTIVE/OBJECTIVE:  Misael Snow (:  1970) has requested an audio/video evaluation for the following concern(s):Hypertension      VV with Bennett Torres today for HTN and medication refills. HTN- BP is well controlled today. Compliant with taking medications. Denies chest pain, dyspnea, edema, or RIOS. Sosa Lee is working well for him. No negative side effects. Mood-stable, working with NP George Flanagan at [address] [facility].. Taking medications as prescribed. Impaired glucose tolerance-a1c in .7. making effort to make better food choices. Weight is stable. He is active. PHQ Scores 2021 3/18/2019 2019 2018 2018   PHQ2 Score 0 0 0 0 0   PHQ9 Score 0 0 0 0 0     Interpretation of Total Score Depression Severity: 1-4 = Minimal depression, 5-9 = Mild depression, 10-14 = Moderate depression, 15-19 = Moderately severe depression, 20-27 = Severe depression    Ht Readings from Last 3 Encounters:   20 5' 10\" (1.778 m)   19 5' 10\" (1.778 m)   19 5' 10\" (1.778 m)     Wt Readings from Last 3 Encounters:   20 223 lb (101.2 kg)   20 220 lb (99.8 kg)   19 223 lb (101.2 kg)         Review of Systems   Constitutional: Negative. HENT: Negative. Respiratory: Negative. Negative for cough and shortness of breath. Cardiovascular: Negative. Negative for chest pain. Gastrointestinal: Negative. Endocrine: Negative. Genitourinary:        Ed     Musculoskeletal: Negative. Negative for arthralgias and back pain. Skin: Negative. Allergic/Immunologic: Positive for environmental allergies. Neurological: Negative. Negative for seizures. Psychiatric/Behavioral: Negative. Prior to Visit Medications    Medication Sig Taking?  Authorizing Provider   amLODIPine (NORVASC) 10 MG tablet TAKE 1 TABLET BY MOUTH ONE TIME A DAY Yes Tita Hernandez, APRN - CNP   aspirin 81 MG EC tablet TAKE 1 TABLET BY MOUTH ONE TIME A DAY Yes Tita Hernandez APRN - CNP   sildenafil (VIAGRA) 100 MG tablet TAKE 1 TABLET BY MOUTH ONE TIME A DAY AS NEEDED FOR ERECTILE DYSFUNCTION Yes Tita Hernandez, APRN - CNP   valACYclovir (VALTREX) 500 MG tablet TAKE 1 TABLET BY MOUTH ONE TIME A DAY. start within 72 hours of symptom onset Yes Tita Hernandez, APRN - CNP   amphetamine-dextroamphetamine (ADDERALL XR) 20 MG extended release capsule Take 2 capsules by mouth daily. Yes Bret Meals, APRN - CNP   lamoTRIgine (LAMICTAL) 25 MG tablet take 1 tab daily X 2 weeks, then 2 tabs daily in am Yes Bret Meals, APRN - CNP   sertraline (ZOLOFT) 50 MG tablet Take 50 mg by mouth daily  Yes Bret Meals, APRN - CNP   EPINEPHrine (EPIPEN 2-LAURA) 0.3 MG/0.3ML SOAJ injection Inject 0.3 mLs into the muscle once as needed (severe allergic reaction)  Frances Machuca MD       Social History     Tobacco Use    Smoking status: Former Smoker     Packs/day: 0.50     Years: 15.00     Pack years: 7.50     Types: Cigarettes    Smokeless tobacco: Never Used    Tobacco comment: quit 2015   Substance Use Topics    Alcohol use: No     Comment: none since 2010    Drug use: No          PHYSICAL EXAMINATION:    Vital Signs: (As obtained by patient/caregiver or practitioner observation)    Patient-Reported Vitals 3/1/2021   Patient-Reported Weight -   Patient-Reported Height -   Patient-Reported Systolic 663   Patient-Reported Diastolic 86                 Constitutional: [x] Appears well-developed and well-nourished [x] No apparent distress      [] Abnormal-       Mental status  [x] Alert and awake  [x] Oriented to person/place/time [x]Able to follow commands      Eyes:  EOM    [x]  Normal  [] Abnormal-  Sclera  [x]  Normal  [] Abnormal -         Discharge [x]  None visible  [] Abnormal -    HENT:   [x] Normocephalic, atraumatic.   [] Abnormal   [x] Mouth/Throat: Mucous membranes are moist.     External Ears [x] Normal  [] Abnormal-     Neck: [x] No visualized mass     Pulmonary/Chest: [x] Respiratory effort normal.  [x] No visualized signs of difficulty breathing or respiratory distress        [] Abnormal        Musculoskeletal:   [x] Normal gait with no signs of ataxia         [x] Normal range of motion of neck        [] Abnormal-       Neurological:        [x] No Facial Asymmetry (Cranial nerve 7 motor function) (limited exam to video visit)          [x] No gaze palsy        [] Abnormal-            Skin:        [x] No significant exanthematous lesions or discoloration noted on facial skin         [] Abnormal-            Psychiatric:      [x] No Hallucinations       [x]Mood is normal      [x]Behavior is normal      [x]Judgment is normal      [x]Thought content is normal       [] Abnormal-     Other pertinent observable physical exam findings-    Due to this being a TeleHealth encounter, evaluation of the following organ systems is limited: Vitals/Constitutional/EENT/Resp/CV/GI//MS/Neuro/Skin/Heme-Lymph-Imm.       Lab Results   Component Value Date    WBC 3.8 12/03/2020    HGB 15.4 12/03/2020    HCT 45.2 12/03/2020    MCV 84.9 12/03/2020     12/03/2020       Lab Results   Component Value Date     12/03/2020    K 4.4 12/03/2020     12/03/2020    CO2 26 12/03/2020    BUN 13 12/03/2020    CREATININE 1.09 12/03/2020    GLUCOSE 103 12/03/2020    CALCIUM 9.3 12/03/2020        Lab Results   Component Value Date    ALT 14 01/09/2020    AST 18 01/09/2020    ALKPHOS 58 01/09/2020    BILITOT 0.68 01/09/2020       Lab Results   Component Value Date    TSH 1.19 01/09/2020       Lab Results   Component Value Date    CHOL 174 12/03/2020    CHOL 185 09/07/2018    CHOL 190 06/04/2018     Lab Results   Component Value Date    TRIG 58 12/03/2020    TRIG 62 09/07/2018    TRIG 54 06/04/2018     Lab Results   Component Value Date    HDL 53 12/03/2020    HDL 55 09/07/2018    HDL 63 06/04/2018     Lab Results   Component Value Date    LDLCHOLESTEROL 109 12/03/2020    LDLCHOLESTEROL 118 09/07/2018    LDLCHOLESTEROL 116 06/04/2018       Lab Results   Component Value Date    CHOLHDLRATIO 3.3 12/03/2020    CHOLHDLRATIO 3.4 09/07/2018    CHOLHDLRATIO 3.0 06/04/2018       Lab Results   Component Value Date    LABA1C 5.7 12/03/2020    LABA1C 5.4 06/04/2018         Lab Results   Component Value Date    FRSSLBLR32 627 02/12/2019       No results found for: VITD25    Orders Placed This Encounter   Medications    amLODIPine (NORVASC) 10 MG tablet     Sig: TAKE 1 TABLET BY MOUTH ONE TIME A DAY     Dispense:  90 tablet     Refill:  1    aspirin 81 MG EC tablet     Sig: TAKE 1 TABLET BY MOUTH ONE TIME A DAY     Dispense:  90 tablet     Refill:  3    sildenafil (VIAGRA) 100 MG tablet     Sig: TAKE 1 TABLET BY MOUTH ONE TIME A DAY AS NEEDED FOR ERECTILE DYSFUNCTION     Dispense:  15 tablet     Refill:  5    valACYclovir (VALTREX) 500 MG tablet     Sig: TAKE 1 TABLET BY MOUTH ONE TIME A DAY. start within 72 hours of symptom onset     Dispense:  90 tablet     Refill:  1       No orders of the defined types were placed in this encounter. Medications Discontinued During This Encounter   Medication Reason    aspirin 81 MG EC tablet REORDER    amLODIPine (NORVASC) 10 MG tablet REORDER    sildenafil (VIAGRA) 100 MG tablet REORDER    valACYclovir (VALTREX) 500 MG tablet REORDER      ASSESSMENT/PLAN:    1. Essential hypertension  -     amLODIPine (NORVASC) 10 MG tablet; TAKE 1 TABLET BY MOUTH ONE TIME A DAY, Disp-90 tablet, R-1Normal  -     aspirin 81 MG EC tablet; TAKE 1 TABLET BY MOUTH ONE TIME A DAY, Disp-90 tablet, R-3Normal  2. Erectile dysfunction, unspecified erectile dysfunction type  -     sildenafil (VIAGRA) 100 MG tablet; TAKE 1 TABLET BY MOUTH ONE TIME A DAY AS NEEDED FOR ERECTILE DYSFUNCTION, Disp-15 tablet, R-5Normal  3. Herpes genitalis in men  -     valACYclovir (VALTREX) 500 MG tablet; TAKE 1 TABLET BY MOUTH ONE TIME A DAY. start within 72 hours of symptom onset, Disp-90 tablet, R-1Normal  4. Prediabetes  Counseled on ways to control this. Increase activity, increase in vegetables and fruits, avoid sweets, limit alcohol, weight loss. Bridgette Flak received counseling on the following healthy behaviors: nutrition, exercise and medication adherence  Reviewed prior labs and health maintenance  Discussed use, benefit, and side effects of prescribed medications. Barriers to medication compliance addressed. Patient given educational materials - see patient instructions  All patient questions answered. Patient voiced understanding. The patient's past medical,surgical, social, and family history as well as his current medications and allergies were reviewed as documented in today's encounter. Medications, labs, diagnostic studies, consultations and follow-up as documented in this encounter. No follow-ups on file. Data Unavailable    No future appointments. Juan Miguel Khalil is a 44 y.o. male being evaluated by a Virtual Visit (video visit) encounter to address concerns as mentioned above. Due to this being a TeleHealth encounter (During [facility]-11 public health emergency), evaluation of the following organ systems was limited: Vitals/Constitutional/EENT/Resp/CV/GI//MS/Neuro/Skin/Heme-Lymph-Imm. Pursuant to the emergency declaration under the [address] [place] [address] authority and the [employer] Act, this Virtual Visit was conducted with patient's (and/or legal guardian's) consent, to reduce the patient's risk of exposure to COVID-19 and provide necessary medical care. The patient (and/or legal guardian) has also been advised to contact this office for worsening conditions or problems, and seek emergency medical treatment and/or call 911 if deemed necessary.      Patient identification was verified at the start of the visit: Yes      --HARRY Ramirez Asp - CNP on 3/1/2021 at 10:13 AM  An electronic signature was used to authenticate this note.

## 2021-03-17 ENCOUNTER — OFFICE VISIT (OUTPATIENT)
Dept: FAMILY MEDICINE CLINIC | Age: 51
End: 2021-03-17
Payer: MEDICARE

## 2021-03-17 VITALS
OXYGEN SATURATION: 98 % | HEIGHT: 70 IN | HEART RATE: 70 BPM | DIASTOLIC BLOOD PRESSURE: 84 MMHG | WEIGHT: 230 LBS | TEMPERATURE: 97.6 F | BODY MASS INDEX: 32.93 KG/M2 | SYSTOLIC BLOOD PRESSURE: 120 MMHG

## 2021-03-17 DIAGNOSIS — R06.83 LOUD SNORING: ICD-10-CM

## 2021-03-17 DIAGNOSIS — R06.89 GASPING FOR BREATH: Primary | ICD-10-CM

## 2021-03-17 DIAGNOSIS — R06.81 WITNESSED EPISODE OF APNEA: ICD-10-CM

## 2021-03-17 DIAGNOSIS — R53.83 FATIGUE, UNSPECIFIED TYPE: ICD-10-CM

## 2021-03-17 PROCEDURE — G8427 DOCREV CUR MEDS BY ELIG CLIN: HCPCS | Performed by: NURSE PRACTITIONER

## 2021-03-17 PROCEDURE — G8417 CALC BMI ABV UP PARAM F/U: HCPCS | Performed by: NURSE PRACTITIONER

## 2021-03-17 PROCEDURE — 3017F COLORECTAL CA SCREEN DOC REV: CPT | Performed by: NURSE PRACTITIONER

## 2021-03-17 PROCEDURE — 1036F TOBACCO NON-USER: CPT | Performed by: NURSE PRACTITIONER

## 2021-03-17 PROCEDURE — G8484 FLU IMMUNIZE NO ADMIN: HCPCS | Performed by: NURSE PRACTITIONER

## 2021-03-17 PROCEDURE — 99213 OFFICE O/P EST LOW 20 MIN: CPT | Performed by: NURSE PRACTITIONER

## 2021-03-17 ASSESSMENT — ENCOUNTER SYMPTOMS
BACK PAIN: 0
COUGH: 0
GASTROINTESTINAL NEGATIVE: 1
RESPIRATORY NEGATIVE: 1
SHORTNESS OF BREATH: 0

## 2021-03-17 NOTE — PROGRESS NOTES
[address]  [facility].  [address] [address]  Dept: [phone]  Dept Fax: [phone]    Rolanda Odell is a 44 y.o. male who presents today for his medical conditions/complaintsas noted below. Rolanda Odell is c/o of   Chief Complaint   Patient presents with   [employer] Other     needs sleep study         HPI:     HPI  Leroykadie Knowles is here today for issues with snoring, witnessed gasping for breath in his sleep, daytime fatigue. Sleeps mainly on his abdomen or side. He has had a slight weight gain but overall is very physically fit. He works out regularly. Has not had sleep study in past. Does have HTN. HTN- BP is well controlled today. Compliant with taking medications. Denies chest pain, dyspnea, edema, or HA.        Hemoglobin A1C (%)   Date Value   12/03/2020 5.7   06/04/2018 5.4             ( goal A1Cis < 7)   No results found for: LABMICR  LDL Cholesterol (mg/dL)   Date Value   12/03/2020 109   09/07/2018 118   06/04/2018 116       (goal LDL is <100)   AST (U/L)   Date Value   01/09/2020 18     ALT (U/L)   Date Value   01/09/2020 14     BUN (mg/dL)   Date Value   12/03/2020 13     BP Readings from Last 3 Encounters:   03/17/21 120/84   12/01/20 132/86   04/14/20 134/81          (goal 120/80)    Past Medical History:   Diagnosis Date    [facility]     Depression     prev on zoloft , abilify     Hypertension     Renal insufficiency 11/27/2018      Past Surgical History:   Procedure Laterality Date    BICEPS TENDON REPAIR Left        Family History   Problem Relation Age of Onset    Hypertension Mother     Heart Disease Mother     Diabetes Mother     High Cholesterol Mother     Cancer Father         leukemia    Hypertension Sister         Sisters are twins    Hypertension Brother     Heart Disease Brother        Social History     Tobacco Use    Smoking status: Former Smoker     Packs/day: 0.50     Years: Out    Hib vaccine  Aged Out    Meningococcal (ACWY) vaccine  Aged Out    Pneumococcal 0-64 years Vaccine  Aged Out       Subjective:     Review of Systems   Constitutional: Positive for fatigue. HENT: Negative. Respiratory: Negative. Negative for cough and shortness of breath. Cardiovascular: Negative. Negative for chest pain. Gastrointestinal: Negative. Endocrine: Negative. Genitourinary:        Ed     Musculoskeletal: Negative. Negative for arthralgias and back pain. Skin: Negative. Allergic/Immunologic: Positive for environmental allergies. Neurological: Negative. Negative for seizures. Psychiatric/Behavioral: Positive for sleep disturbance. Objective:     Physical Exam  Vitals signs reviewed. Constitutional:       Appearance: Normal appearance. He is well-developed. HENT:      Head: Normocephalic and atraumatic. Eyes:      Conjunctiva/sclera: Conjunctivae normal.   Neck:      Musculoskeletal: Normal range of motion. Cardiovascular:      Rate and Rhythm: Normal rate and regular rhythm. Heart sounds: Normal heart sounds. No murmur. Pulmonary:      Effort: Pulmonary effort is normal. No respiratory distress. Breath sounds: Normal breath sounds. Musculoskeletal: Normal range of motion. Skin:     General: Skin is warm and dry. Neurological:      General: No focal deficit present. Mental Status: He is alert and oriented to person, place, and time. Psychiatric:         Mood and Affect: Mood normal.         Behavior: Behavior normal.         Thought Content: Thought content normal.         Judgment: Judgment normal.       /84   Pulse 70   Temp 97.6 °F (36.4 °C)   Ht 5' 10\" (1.778 m)   Wt 230 lb (104.3 kg)   SpO2 98%   BMI 33.00 kg/m²     Assessment:       Diagnosis Orders   1. Gasping for breath  Baseline Diagnostic Sleep Study   2. Loud snoring  Baseline Diagnostic Sleep Study   3.  Fatigue, unspecified type  Baseline Diagnostic Sleep Study   4. Witnessed episode of apnea  Baseline Diagnostic Sleep Study             Plan:      Return in about 6 months (around 9/17/2021) for as scheduled. 1. Gasping for breath  2. Loud snoring  3. Fatigue, unspecified type  4. Witnessed episode of apnea    - Baseline Diagnostic Sleep Study; Future      Orders Placed This Encounter   Procedures    Baseline Diagnostic Sleep Study     Standing Status:   Future     Standing Expiration Date:   3/17/2022     Order Specific Question:   Adult or Pediatric     Answer:   Adult Study (>7 Years)     Order Specific Question:   Location For Sleep Study     Answer: Marli Castillo Specific Question:   Select Sleep Lab Location     Answer:   [facility]     Order Specific Question:   Pre-Study Patient Questions: Answer:   Complains of daytime sleepiness     Order Specific Question:   Pre-Study Patient Questions: Answer:   Snores loudly during sleep     Order Specific Question:   Pre-Study Patient Questions: Answer:   Reports choking or gasping for breath during sleep     Order Specific Question:   Pre-Study Patient Questions: Answer: Others have witnessed episodes of apnea while the patient sleeps     Order Specific Question:   Pre-Study Patient Questions: Answer:   Complains of morning headaches     Order Specific Question:   Pre-Study Patient Questions: Answer:   Impaired attention, concentration, or memory due to poor sleep     No orders of the defined types were placed in this encounter. Patient given educational materials - see patient instructions. Discussed use, benefit, and side effects of prescribed medications. All patientquestions answered. Pt voiced understanding. Reviewed health maintenance. Instructedto continue current medications, diet and exercise. Patient agreed with treatmentplan. Follow up as directed.      Electronically signed by HARRY Herrera CNP on 3/17/2021 at 10:40 AM

## 2021-03-17 NOTE — PROGRESS NOTES
Visit Information    Have you changed or started any medications since your last visit including any over-the-counter medicines, vitamins, or herbal medicines? no   Are you having any side effects from any of your medications? -  no  Have you stopped taking any of your medications? Is so, why? -  no    Have you seen any other physician or provider since your last visit? No  Have you had any other diagnostic tests since your last visit? No  Have you been seen in the emergency room and/or had an admission to a hospital since we last saw you? No  Have you had your routine dental cleaning in the past 6 months? no    Have you activated your "[employer] account? If not, what are your barriers?  Yes     Patient Care Team:  HARRY Morfin CNP as PCP - General (Family Nurse Practitioner)  HARRY Morfin CNP as PCP - [facility] Provider  May Simmons MD as Consulting Physician (Hematology and Oncology)  HARRY Sterling CNP as Consulting Physician (Nurse Practitioner)    Medical History Review  Past Medical, Family, and Social History reviewed and does contribute to the patient presenting condition    Health Maintenance   Topic Date Due    COVID-19 Vaccine (1) Never done    Shingles Vaccine (1 of 2) Never done    Annual Wellness Visit (AWV)  Never done    Flu vaccine (1) 06/30/2021 (Originally 9/1/2020)    A1C test (Diabetic or Prediabetic)  12/03/2021    Potassium monitoring  12/03/2021    Creatinine monitoring  12/03/2021    Colon cancer screen fecal DNA test (Cologuard)  10/11/2022    Lipid screen  12/03/2025    DTaP/Tdap/Td vaccine (2 - Td) 05/29/2028    Hepatitis C screen  Completed    HIV screen  Completed    Hepatitis A vaccine  Aged Out    Hepatitis B vaccine  Aged Out    Hib vaccine  Aged Out    Meningococcal (ACWY) vaccine  Aged Out    Pneumococcal 0-64 years Vaccine  Aged Out

## 2021-04-20 ENCOUNTER — HOSPITAL ENCOUNTER (OUTPATIENT)
Dept: SLEEP CENTER | Age: 51
Discharge: HOME | End: 2021-04-22
Payer: MEDICARE

## 2021-04-20 DIAGNOSIS — R06.83 LOUD SNORING: ICD-10-CM

## 2021-04-20 DIAGNOSIS — R53.83 FATIGUE, UNSPECIFIED TYPE: ICD-10-CM

## 2021-04-20 DIAGNOSIS — R06.81 WITNESSED EPISODE OF APNEA: ICD-10-CM

## 2021-04-20 DIAGNOSIS — R06.89 GASPING FOR BREATH: ICD-10-CM

## 2021-04-20 PROCEDURE — 95810 POLYSOM 6/> YRS 4/> PARAM: CPT

## 2021-04-21 VITALS
OXYGEN SATURATION: 94 % | HEART RATE: 60 BPM | WEIGHT: 230 LBS | TEMPERATURE: 97.5 F | BODY MASS INDEX: 32.93 KG/M2 | RESPIRATION RATE: 14 BRPM | HEIGHT: 70 IN

## 2021-04-21 ASSESSMENT — SLEEP AND FATIGUE QUESTIONNAIRES
HOW LIKELY ARE YOU TO NOD OFF OR FALL ASLEEP WHILE SITTING AND TALKING TO SOMEONE: 0
HOW LIKELY ARE YOU TO NOD OFF OR FALL ASLEEP IN A CAR, WHILE STOPPED FOR A FEW MINUTES IN TRAFFIC: 0
HOW LIKELY ARE YOU TO NOD OFF OR FALL ASLEEP WHEN YOU ARE A PASSENGER IN A CAR FOR AN HOUR WITHOUT A BREAK: 0
HOW LIKELY ARE YOU TO NOD OFF OR FALL ASLEEP WHILE SITTING INACTIVE IN A PUBLIC PLACE: 0
HOW LIKELY ARE YOU TO NOD OFF OR FALL ASLEEP WHILE SITTING AND READING: 1
HOW LIKELY ARE YOU TO NOD OFF OR FALL ASLEEP WHILE WATCHING TV: 3

## 2021-04-29 DIAGNOSIS — G47.33 OBSTRUCTIVE SLEEP APNEA SYNDROME: Primary | ICD-10-CM

## 2021-04-29 LAB — STATUS: NORMAL

## 2021-04-29 PROCEDURE — 95810 POLYSOM 6/> YRS 4/> PARAM: CPT | Performed by: PSYCHIATRY & NEUROLOGY

## 2021-06-30 ENCOUNTER — HOSPITAL ENCOUNTER (OUTPATIENT)
Dept: SLEEP CENTER | Age: 51
Discharge: HOME | End: 2021-07-02
Payer: MEDICARE

## 2021-06-30 DIAGNOSIS — G47.33 OBSTRUCTIVE SLEEP APNEA SYNDROME: ICD-10-CM

## 2021-06-30 PROCEDURE — 95811 POLYSOM 6/>YRS CPAP 4/> PARM: CPT

## 2021-07-01 VITALS
HEART RATE: 60 BPM | HEIGHT: 70 IN | BODY MASS INDEX: 32.93 KG/M2 | WEIGHT: 230 LBS | RESPIRATION RATE: 14 BRPM | OXYGEN SATURATION: 94 %

## 2021-07-13 LAB — STATUS: NORMAL

## 2021-07-14 DIAGNOSIS — G47.33 OBSTRUCTIVE SLEEP APNEA SYNDROME: Primary | ICD-10-CM

## 2021-07-20 ENCOUNTER — NURSE TRIAGE (OUTPATIENT)
Dept: OTHER | Facility: CLINIC | Age: 51
End: 2021-07-20

## 2021-07-20 ENCOUNTER — TELEMEDICINE (OUTPATIENT)
Dept: FAMILY MEDICINE CLINIC | Age: 51
End: 2021-07-20
Payer: MEDICARE

## 2021-07-20 DIAGNOSIS — G47.33 OSA ON CPAP: Primary | ICD-10-CM

## 2021-07-20 DIAGNOSIS — R21 SKIN RASH: ICD-10-CM

## 2021-07-20 DIAGNOSIS — Z99.89 OSA ON CPAP: Primary | ICD-10-CM

## 2021-07-20 PROCEDURE — 99441 PR PHYS/QHP TELEPHONE EVALUATION 5-10 MIN: CPT | Performed by: NURSE PRACTITIONER

## 2021-07-20 ASSESSMENT — ENCOUNTER SYMPTOMS
RESPIRATORY NEGATIVE: 1
BACK PAIN: 0
COUGH: 0
GASTROINTESTINAL NEGATIVE: 1
SHORTNESS OF BREATH: 0

## 2021-07-20 NOTE — TELEPHONE ENCOUNTER
I agree with ED evaluation
Not the last few days it is slow and painful, a few days ago    6. CAUSE: \"What do you think is causing the symptoms? \"      Yes had sex     7. OTHER SYMPTOMS: \"Do you have any other symptoms? \" (e.g., fever, abdominal pain, blood in urine)      No other symptoms no discharge noted    Protocols used: PENIS AND SCROTUM [facility]

## 2021-07-20 NOTE — PROGRESS NOTES
Pineda Arzola is a 46 y.o. male evaluated via telephone on  21    Pineda Arzola (:  1970) is a 46 y.o. male, here for evaluation of the following chief complaint(s): No chief complaint on file. Consent:  He is aware that that he may receive a bill for this telephone service, depending on his insurance coverage, and has provided verbal consent to proceed: Yes      Documentation and HPI:  I communicated with the patient about: No chief complaint on file. Zhang Coyne reports: waiting on CPAP equipment. Will be seeing Dr. Dolly Eid for follow up and monitoring of his CPAP use/effectiveness. Has dry itching spot on penis. Does have hx of HSV but has not had any sore in this location. There is no redness, drainage, open area, pain. No exposure to STD. Has been using neosporin which has not made any difference. PHQ Scores 2021 3/18/2019 2019 2018 2018   PHQ2 Score 0 0 0 0 0   PHQ9 Score 0 0 0 0 0     Interpretation of Total Score Depression Severity: 1-4 = Minimal depression, 5-9 = Mild depression, 10-14 = Moderate depression, 15-19 = Moderately severe depression, 20-27 = Severe depression      The patient's past medical, surgical, social, and family history as well as his current medications and allergies were reviewed as documented in today's encounter. Prior to Visit Medications    Medication Sig Taking? Authorizing Provider   amLODIPine (NORVASC) 10 MG tablet TAKE 1 TABLET BY MOUTH ONE TIME A DAY  HARRY Marie CNP   aspirin 81 MG EC tablet TAKE 1 TABLET BY MOUTH ONE TIME A DAY  HARRY Marie CNP   sildenafil (VIAGRA) 100 MG tablet TAKE 1 TABLET BY MOUTH ONE TIME A DAY AS NEEDED FOR ERECTILE DYSFUNCTION  HARRY Marie CNP   valACYclovir (VALTREX) 500 MG tablet TAKE 1 TABLET BY MOUTH ONE TIME A DAY.  start within 72 hours of symptom onset  HARRY Samuel CNP   amphetamine-dextroamphetamine (ADDERALL XR) 20 MG extended release capsule Take 2 capsules by mouth daily. HARRY Spears CNP   EPINEPHrine (EPIPEN 2-LAURA) 0.3 MG/0.3ML SOAJ injection Inject 0.3 mLs into the muscle once as needed (severe allergic reaction)  Cristal Wakefield MD   lamoTRIgine (LAMICTAL) 25 MG tablet take 1 tab daily X 2 weeks, then 2 tabs daily in am  HARRY Spears CNP   sertraline (ZOLOFT) 50 MG tablet Take 50 mg by mouth daily   HARRY Spears CNP       Patient-Reported Vitals 7/20/2021   Patient-Reported Weight 225   Patient-Reported Height 510   Patient-Reported Systolic 569   Patient-Reported Diastolic 80          No orders of the defined types were placed in this encounter. No orders of the defined types were placed in this encounter. There are no discontinued medications. Review of Systems   Constitutional: Positive for fatigue. HENT: Negative. Respiratory: Negative. Negative for cough and shortness of breath. Cardiovascular: Negative. Negative for chest pain. Gastrointestinal: Negative. Endocrine: Negative. Genitourinary:        Ed     Musculoskeletal: Negative. Negative for arthralgias and back pain. Skin: Negative. Allergic/Immunologic: Positive for environmental allergies. Neurological: Negative. Negative for seizures. Psychiatric/Behavioral: Positive for sleep disturbance. ASSESSMENT/PLAN:    1. TATE on CPAP  2. Skin rash    Will be seeing Dr. Terrie Leigh for TATE and CPAP follow up    Recommend that he get otc anti fungal jock itch cream and try this on the lesion. All patient questions answered. Patient voiced understanding. The patient's past medical,surgical, social, and family history as well as his current medications and allergies were reviewed as documented in today's encounter. Medications, labs, diagnostic studies, consultations and follow-up as documented in this encounter. No follow-ups on file.     Data Unavailable    I

## 2021-10-13 ENCOUNTER — OFFICE VISIT (OUTPATIENT)
Dept: PULMONOLOGY | Age: 51
End: 2021-10-13
Payer: MEDICARE

## 2021-10-13 VITALS
DIASTOLIC BLOOD PRESSURE: 84 MMHG | HEIGHT: 70 IN | HEART RATE: 68 BPM | RESPIRATION RATE: 17 BRPM | BODY MASS INDEX: 32.93 KG/M2 | SYSTOLIC BLOOD PRESSURE: 105 MMHG | OXYGEN SATURATION: 98 % | TEMPERATURE: 97.5 F | WEIGHT: 230 LBS

## 2021-10-13 DIAGNOSIS — I10 PRIMARY HYPERTENSION: ICD-10-CM

## 2021-10-13 DIAGNOSIS — Z99.89 OSA ON CPAP: Primary | ICD-10-CM

## 2021-10-13 DIAGNOSIS — G47.33 OSA ON CPAP: Primary | ICD-10-CM

## 2021-10-13 DIAGNOSIS — F31.75 BIPOLAR DISORDER, IN PARTIAL REMISSION, MOST RECENT EPISODE DEPRESSED (HCC): ICD-10-CM

## 2021-10-13 DIAGNOSIS — E66.9 CLASS 1 OBESITY WITH SERIOUS COMORBIDITY AND BODY MASS INDEX (BMI) OF 31.0 TO 31.9 IN ADULT, UNSPECIFIED OBESITY TYPE: ICD-10-CM

## 2021-10-13 PROCEDURE — 3017F COLORECTAL CA SCREEN DOC REV: CPT | Performed by: INTERNAL MEDICINE

## 2021-10-13 PROCEDURE — 99204 OFFICE O/P NEW MOD 45 MIN: CPT | Performed by: INTERNAL MEDICINE

## 2021-10-13 PROCEDURE — 1036F TOBACCO NON-USER: CPT | Performed by: INTERNAL MEDICINE

## 2021-10-13 PROCEDURE — G8427 DOCREV CUR MEDS BY ELIG CLIN: HCPCS | Performed by: INTERNAL MEDICINE

## 2021-10-13 PROCEDURE — G8417 CALC BMI ABV UP PARAM F/U: HCPCS | Performed by: INTERNAL MEDICINE

## 2021-10-13 PROCEDURE — G8484 FLU IMMUNIZE NO ADMIN: HCPCS | Performed by: INTERNAL MEDICINE

## 2021-10-13 ASSESSMENT — SLEEP AND FATIGUE QUESTIONNAIRES
ESS TOTAL SCORE: 0
HOW LIKELY ARE YOU TO NOD OFF OR FALL ASLEEP IN A CAR, WHILE STOPPED FOR A FEW MINUTES IN TRAFFIC: 0
HOW LIKELY ARE YOU TO NOD OFF OR FALL ASLEEP WHILE LYING DOWN TO REST IN THE AFTERNOON WHEN CIRCUMSTANCES PERMIT: 0
HOW LIKELY ARE YOU TO NOD OFF OR FALL ASLEEP WHEN YOU ARE A PASSENGER IN A CAR FOR AN HOUR WITHOUT A BREAK: 0
HOW LIKELY ARE YOU TO NOD OFF OR FALL ASLEEP WHILE SITTING INACTIVE IN A PUBLIC PLACE: 0
HOW LIKELY ARE YOU TO NOD OFF OR FALL ASLEEP WHILE SITTING AND READING: 0
HOW LIKELY ARE YOU TO NOD OFF OR FALL ASLEEP WHILE SITTING AND TALKING TO SOMEONE: 0
HOW LIKELY ARE YOU TO NOD OFF OR FALL ASLEEP WHILE WATCHING TV: 0
HOW LIKELY ARE YOU TO NOD OFF OR FALL ASLEEP WHILE SITTING QUIETLY AFTER LUNCH WITHOUT ALCOHOL: 0

## 2021-10-13 NOTE — PROGRESS NOTES
REASON FOR THE CONSULTATION:  sleep apnea syndrome  Obesity  And hypertension  HISTORY OF PRESENT ILLNESS:    Helga Krabbe is a 46y.o. year old male here for evaluation of sleep apnea syndrome that was diagnosed after his sleep study. The apnea hypopnea index was only 10. However he was started on CPAP with a nasal mask. Unfortunately he has not been able to tolerate the CPAP. This is primarily related to the nasal mask. Patient does have symptoms of sleep apnea he does snore. He sleeps mostly on the side. His sleep is not refreshing. He does feel tired fatigued and sleepy during the daytime. There are no symptoms of cataplexy sleep paralysis or any hallucinations. He has not noted any pedal edema no thromboembolic process. He is not known to have any coronary artery disease history. He is known to have hypertension which is well controlled no diabetes no thyroid dysfunction no stroke no pedal edema. No thromboembolic process. Is known to have systemic hypertension that is under good control. The medical record indicates that there is also some degree of renal insufficiency secondary to hypertension  No peptic ulcer disease no esophageal flux disease. Patient seems very motivated to to use the machine however except that he does not like the mask at all. No family history of sleep apnea. Does not smoke he does not drink no alcohol abuse no drug abuse. 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 He has been trying to lose weight. Have had Covid vacs  . History of depression which is well controlled no suicidal tendency    LUNG CANCER SCREENING     1. CRITERIA MET    []     CT ORDERED  []      2. CRITERIA NOT MET   [x]      3. REFUSED                    []        REASON CRITERIA NOT MET     1. SMOKING LESS THAN 30 PY  []      2. AGE LESS THAN 55 or GREATER 77 YEARS  []      3.  QUIT SMOKING 15 YEARS OR GREATER   []      4. RECENT CT WITH IN 6 MONTHS    []      5. LIFE EXPECTANCY < 5 YEARS   []      6. SIGNS  AND SYMPTOMS OF LUNG CANCER   []         Immunization   Immunization History   Administered Date(s) Administered    COVID-19, Moderna, PF, 100mcg/0.5mL 03/29/2021, 04/26/2021    Influenza Virus Vaccine 11/01/2011    Influenza, Mary Beth Pleva, IM, (6 mo and older Fluzone, Flulaval, Fluarix and 3 yrs and older Afluria) 11/01/2011, 09/18/2019    Influenza, Quadv, IM, PF (6 mo and older Fluzone, Flulaval, Fluarix, and 3 yrs and older Afluria) 09/14/2018    Td vaccine (adult) 02/15/2003    Tdap (Boostrix, Adacel) 05/29/2018        Pneumococcal Vaccine     [x] Up to date    [] Indicated   [] Refused  [] Contraindicated       Influenza Vaccine   [x] Up to date    [] Indicated   [] Refused  [] Contraindicated          PAST MEDICAL HISTORY:       Diagnosis Date    Bipolar 1 disorder ([facility] [address]     Depression     prev on zoloft , abilify     Hypertension     Renal insufficiency 11/27/2018         Family History:       Problem Relation Age of Onset    Hypertension Mother     Heart Disease Mother     Diabetes Mother     High Cholesterol Mother     Cancer Father         leukemia    Hypertension Sister         Sisters are twins    Hypertension Brother     Heart Disease Brother        SURGICAL HISTORY:   Past Surgical History:   Procedure Laterality Date    BICEPS TENDON REPAIR Left            SOCIAL AND OCCUPATIONAL HEALTH:      There there is no history of TB or TB exposure. There is no asbestos or silica dust exposure. The patient reports is no coal, foundry, quarry or [employer] exposure. Travel history reveals negative  There is no history of recreational or IV drug use. There is no hot tube exposure. Pets  absent    Occupational history [employer] patient    TOBACCO:   reports that he has quit smoking. His smoking use included cigarettes. He has a 7.50 pack-year smoking history.  He has never used smokeless tobacco.  ETOH:   reports no history of alcohol use. ALLERGIES:      Allergies   Allergen Reactions    Latex Hives    Lisinopril     Nsaids Itching     Tolerates baby asa    Pcn [Penicillins] Swelling     Swelling,hives, itching and shortness of breath         Home Meds:   Prior to Admission medications    Medication Sig Start Date End Date Taking? Authorizing Provider   amLODIPine (NORVASC) 10 MG tablet TAKE 1 TABLET BY MOUTH ONE TIME A DAY 3/1/21  Yes HARRY Marie CNP   aspirin 81 MG EC tablet TAKE 1 TABLET BY MOUTH ONE TIME A DAY 3/1/21  Yes HARRY Marie CNP   sildenafil (VIAGRA) 100 MG tablet TAKE 1 TABLET BY MOUTH ONE TIME A DAY AS NEEDED FOR ERECTILE DYSFUNCTION 3/1/21  Yes HARRY Marie CNP   valACYclovir (VALTREX) 500 MG tablet TAKE 1 TABLET BY MOUTH ONE TIME A DAY. start within 72 hours of symptom onset 3/1/21  Yes HARRY Marie CNP   amphetamine-dextroamphetamine (ADDERALL XR) 20 MG extended release capsule Take 2 capsules by mouth daily.  4/8/20  Yes HARRY Page CNP   lamoTRIgine (LAMICTAL) 25 MG tablet take 1 tab daily X 2 weeks, then 2 tabs daily in am 3/6/19  Yes HARRY Page CNP   sertraline (ZOLOFT) 50 MG tablet Take 50 mg by mouth daily  1/10/19  Yes HARRY Page CNP   EPINEPHrine (EPIPEN 2-LAURA) 0.3 MG/0.3ML SOAJ injection Inject 0.3 mLs into the muscle once as needed (severe allergic reaction) 6/18/19 4/14/20  Torsten Harrison MD              REVIEW OF SYSTEMS:    CONSTITUTIONAL:  negative for  fevers, chills, sweats, fatigue, malaise, anorexia and weight loss morbid obesity no distress not using accessory muscles  EYES:  negative for  double vision, blurred vision, dry eyes, eye discharge and redness no jaundice no Jovany syndrome  HEENT:  negative for  hearing loss, tinnitus, ear drainage, earaches and nasal congestion  RESPIRATORY:  See hpi no dyspnea no cough or sputum production  CARDIOVASCULAR:  negative for  chest pain,, palpitations, orthopnea, PND history of hypertension no angina no PND  GASTROINTESTINAL:  negative for nausea, vomiting, change in bowel habits, diarrhea, no acid reflux constipation, abdominal pain, pruritus, abdominal mass and abdominal distention  GENITOURINARY:  negative for frequency, dysuria, nocturia, urinary incontinence and hesitancy erectile dysfunction  INTEGUMENT  negative for rash, skin lesion(s), dryness, skin color change, changes in lesion, pruritus and changes in hair negative  HEMATOLOGIC/LYMPHATIC:  negative for easy bruising, bleeding, lymphadenopathy, petechiae and swelling/edema  ALLERGIC/IMMUNOLOGIC:  negative for recurrent infections, urticaria and drug reactions  ENDOCRINE:  negative for heat intolerance, cold intolerance, tremor, weight changes and change in bowel habits no diabetes  MUSCULOSKELETAL:  negative for  myalgias, arthralgias, pain, joint swelling, stiff joints and decreased range of motion no joint pain  NEUROLOGICAL:  negative for headaches, dizziness, seizures, memory problems, speech problems, visual disturbance and coordination problems no motor or sensory deficit history of depression under good control  BEHAVIOR/PSYCH:  negative for poor appetite, increased appetite, decreased sleep, increased sleep, decreased energy level, increased energy level and poor concentration overweight no distress not using accessory muscles  Skin no rash no dermatitis  Vitals:  /84   Pulse 68   Temp 97.5 °F (36.4 °C)   Resp 17   Ht 5' 10\" (1.778 m)   Wt 230 lb (104.3 kg)   SpO2 98%   BMI 33.00 kg/m²     PHYSICAL EXAM:  General Appearance:    Alert, cooperative, no distress, appears stated age   Head:    Normocephalic, without obvious abnormality, atraumatic      Eyes:    PERRL, conjunctiva/corneas clear, EOM's intact no Jovany's no jaundice   Ears:    Normal  external ear canals, both ears normal exam   Nose:   Nares normal, septum midline, mucosa normal, no drainage        or sinus tenderness normal exam oropharyngeal oropharyngeal orifice not compromised   Throat:   Lips, mucosa, and tongue normal; teeth and gums normal   Neck:   Supple, symmetrical, trachea midline, no adenopathy;     thyroid:  no enlargement/tenderness/nodules; no carotid    bruit , JVD not elevated neck is short and fat   Back:     Symmetric, no curvature, ROM normal, no CVA tenderness   Lungs:    AP diameter is not increased percussion note is normally resonant breathing vesicular expiration not prolonged no rales rhonchi are audible no pleural friction rub is audible   Chest Wall:    No tenderness or deformity      Heart:    Regular rate and rhythm, S1 and S2 normal, no murmur, rub        or gallop no rvh                           Abdomen:                                                 Pulses:                              Skin:                  Lymph nodes:                    Neurologic:                  Soft, non-tender, bowel sounds active all four quadrants,     no masses, no organomegaly         2+ and symmetric all extremities     Skin color, texture, turgor normal, no rashes or lesions       Cervical, supraclavicular not enlarged or matted or tender      CNII-XII intact, normal strength 5/5 . Sensation grossly normal  and reflexes normal 2+  throughout     Clubbing No  Lower ext edema absent  Upper ext edema absent         Musculoskeletal no synovitis. No joint swelling or tenderness        CBC: No results for input(s): WBC, HGB, PLT in the last 72 hours. BMP:  No results for input(s): NA, K, CL, CO2, BUN, CREATININE, GLUCOSE in the last 72 hours. Hepatic: No results for input(s): AST, ALT, ALB, BILITOT, ALKPHOS in the last 72 hours. Amylase: No results found for: AMYLASE  Lipase: No results found for: LIPASE  Troponin: No results for input(s): TROPONINI in the last 72 hours. BNP: No results for input(s): BNP in the last 72 hours. Lipids: No results for input(s): CHOL, HDL in the last 72 hours.     Invalid input(s): LDLCALCU  ABGs: No results found for: PHART, PO2ART, [id number]  INR: No results for input(s): INR in the last 72 hours. Thyroid:   Lab Results   Component Value Date    TSH 1.19 01/09/2020     Urinalysis: No results for input(s): BACTERIA, BLOODU, CLARITYU, COLORU, PHUR, PROTEINU, RBCUA, SPECGRAV, BILIRUBINUR, NITRU, WBCUA, LEUKOCYTESUR, GLUCOSEU in the last 72 hours. Cultures:-  No cultures    CXR  No recent x-rays      CT Scans  No recent CT scan    Echo  No echo            IMPRESSION:    Visit Diagnoses       Codes    Primary hypertension     I10      Obstructive sleep apnea syndrome:              Obesity  PLAN:      The gentleman has a mild degree of sleep apnea syndrome which responded well to the use of CPAP. He been trying to use the CPAP but unfortunately because he cannot tolerate the nasal mask. So he has been rather irregular using the CPAP machine. He continues to have daytime symptoms related to sleep apnea. He has not been able to lose much weight also his blood pressure has been under good control    I advised the patient that he should try the full facemask a prescription was given to the patient I plan to see her in follow-up in few weeks. If the full facemask works then will be great otherwise we might try nasal pillows. I encouraged him to lose weight which will greatly help the sleep apnea is mild apnea relatively mild and moderate degree of weight loss may leave the apnea altogether and he may not require the CPAP. I advised him to come monitor his caloric intake and continue his for the physical exercise to lose weight. The weight loss and to the treatment of apnea will also improve the outcome of hypertension. I plan to see in follow-up in few weeks. Dad was given time to try the full facemask.   If that does not work we will try nasal pillows          Requested Prescriptions      No prescriptions requested or ordered in this encounter       There are no discontinued medications. Anna Macdonald received counseling on the following healthy behaviors: nutrition, exercise and medication adherence    Patient given educational materials : see patient instruction       Discussed use, benefit, and side effects of prescribed medications. Barriers to medication compliance addressed. All patient questions answered. Pt voiced understanding. I hope this updates you on my evaluation and clinical thinking. Thank you for allowing me to participate in his care. Sincerely,      Electronically signed by Elfego Bravo MD on   10/13/21 at 2:01 PM EDT       Please note that this chart was generated using voice recognition Dragon dictation software. Although every effort was made to ensure the accuracy of this automated transcription, some errors in transcription may have occurred.

## 2021-10-18 ENCOUNTER — TELEPHONE (OUTPATIENT)
Dept: PULMONOLOGY | Age: 51
End: 2021-10-18

## 2021-11-01 DIAGNOSIS — N52.9 ERECTILE DYSFUNCTION, UNSPECIFIED ERECTILE DYSFUNCTION TYPE: ICD-10-CM

## 2021-11-01 RX ORDER — SILDENAFIL 100 MG/1
TABLET, FILM COATED ORAL
Qty: 15 TABLET | Refills: 5 | Status: SHIPPED | OUTPATIENT
Start: 2021-11-01 | End: 2021-11-15 | Stop reason: SDUPTHER

## 2021-11-01 NOTE — TELEPHONE ENCOUNTER
Please Approve or Refuse.   Send to Pharmacy per Pt's Request:     Next Visit Date:  11/15/2021 NTP  Last Visit Date: 7/20/2021    Hemoglobin A1C (%)   Date Value   12/03/2020 5.7   06/04/2018 5.4             ( goal A1C is < 7)   BP Readings from Last 3 Encounters:   10/13/21 105/84   03/17/21 120/84   12/01/20 132/86          (goal 120/80)  BUN   Date Value Ref Range Status   12/03/2020 13 6 - 20 mg/dL Final     CREATININE   Date Value Ref Range Status   12/03/2020 1.09 0.70 - 1.20 mg/dL Final     Potassium   Date Value Ref Range Status   12/03/2020 4.4 3.7 - 5.3 mmol/L Final

## 2021-11-15 ENCOUNTER — OFFICE VISIT (OUTPATIENT)
Dept: FAMILY MEDICINE CLINIC | Age: 51
End: 2021-11-15
Payer: MEDICARE

## 2021-11-15 VITALS
OXYGEN SATURATION: 98 % | SYSTOLIC BLOOD PRESSURE: 124 MMHG | WEIGHT: 239 LBS | TEMPERATURE: 99.1 F | BODY MASS INDEX: 34.22 KG/M2 | HEIGHT: 70 IN | DIASTOLIC BLOOD PRESSURE: 80 MMHG | HEART RATE: 71 BPM

## 2021-11-15 DIAGNOSIS — I10 ESSENTIAL HYPERTENSION: Primary | ICD-10-CM

## 2021-11-15 DIAGNOSIS — E66.09 CLASS 1 OBESITY DUE TO EXCESS CALORIES WITH SERIOUS COMORBIDITY AND BODY MASS INDEX (BMI) OF 34.0 TO 34.9 IN ADULT: ICD-10-CM

## 2021-11-15 DIAGNOSIS — N52.9 ERECTILE DYSFUNCTION, UNSPECIFIED ERECTILE DYSFUNCTION TYPE: ICD-10-CM

## 2021-11-15 DIAGNOSIS — R73.03 PREDIABETES: ICD-10-CM

## 2021-11-15 DIAGNOSIS — F90.2 ATTENTION DEFICIT HYPERACTIVITY DISORDER (ADHD), COMBINED TYPE: ICD-10-CM

## 2021-11-15 DIAGNOSIS — F31.75 BIPOLAR DISORDER, IN PARTIAL REMISSION, MOST RECENT EPISODE DEPRESSED (HCC): ICD-10-CM

## 2021-11-15 DIAGNOSIS — A60.02 HERPES GENITALIS IN MEN: ICD-10-CM

## 2021-11-15 PROBLEM — T78.3XXA ANGIOEDEMA OF LIPS: Status: RESOLVED | Noted: 2020-04-14 | Resolved: 2021-11-15

## 2021-11-15 LAB — HBA1C MFR BLD: 5.6 %

## 2021-11-15 PROCEDURE — 99214 OFFICE O/P EST MOD 30 MIN: CPT | Performed by: FAMILY MEDICINE

## 2021-11-15 PROCEDURE — G8484 FLU IMMUNIZE NO ADMIN: HCPCS | Performed by: FAMILY MEDICINE

## 2021-11-15 PROCEDURE — G8417 CALC BMI ABV UP PARAM F/U: HCPCS | Performed by: FAMILY MEDICINE

## 2021-11-15 PROCEDURE — G8427 DOCREV CUR MEDS BY ELIG CLIN: HCPCS | Performed by: FAMILY MEDICINE

## 2021-11-15 PROCEDURE — 1036F TOBACCO NON-USER: CPT | Performed by: FAMILY MEDICINE

## 2021-11-15 PROCEDURE — 83036 HEMOGLOBIN GLYCOSYLATED A1C: CPT | Performed by: FAMILY MEDICINE

## 2021-11-15 PROCEDURE — 3017F COLORECTAL CA SCREEN DOC REV: CPT | Performed by: FAMILY MEDICINE

## 2021-11-15 RX ORDER — AMLODIPINE BESYLATE 10 MG/1
TABLET ORAL
Qty: 90 TABLET | Refills: 1 | Status: SHIPPED | OUTPATIENT
Start: 2021-11-15 | End: 2022-03-10

## 2021-11-15 RX ORDER — SILDENAFIL 100 MG/1
TABLET, FILM COATED ORAL
Qty: 15 TABLET | Refills: 5 | Status: SHIPPED | OUTPATIENT
Start: 2021-11-15 | End: 2022-03-31

## 2021-11-15 RX ORDER — VALACYCLOVIR HYDROCHLORIDE 500 MG/1
TABLET, FILM COATED ORAL
Qty: 90 TABLET | Refills: 1 | Status: SHIPPED | OUTPATIENT
Start: 2021-11-15

## 2021-11-15 SDOH — ECONOMIC STABILITY: FOOD INSECURITY: WITHIN THE PAST 12 MONTHS, YOU WORRIED THAT YOUR FOOD WOULD RUN OUT BEFORE YOU GOT MONEY TO BUY MORE.: NEVER TRUE

## 2021-11-15 SDOH — ECONOMIC STABILITY: FOOD INSECURITY: WITHIN THE PAST 12 MONTHS, THE FOOD YOU BOUGHT JUST DIDN'T LAST AND YOU DIDN'T HAVE MONEY TO GET MORE.: NEVER TRUE

## 2021-11-15 ASSESSMENT — ENCOUNTER SYMPTOMS
SINUS PAIN: 0
SINUS PRESSURE: 0
COUGH: 0
CONSTIPATION: 0
CHEST TIGHTNESS: 0
PHOTOPHOBIA: 0
ABDOMINAL PAIN: 0
ANAL BLEEDING: 0
ABDOMINAL DISTENTION: 0
BACK PAIN: 0
SHORTNESS OF BREATH: 0
WHEEZING: 0

## 2021-11-15 ASSESSMENT — SOCIAL DETERMINANTS OF HEALTH (SDOH): HOW HARD IS IT FOR YOU TO PAY FOR THE VERY BASICS LIKE FOOD, HOUSING, MEDICAL CARE, AND HEATING?: NOT HARD AT ALL

## 2021-11-15 NOTE — PROGRESS NOTES
Chief Complaint   Patient presents with   [place] Established New Doctor    Hypertension         Chito Reyes  here today for follow up on chronic medical problems, go over labs and/or diagnostic studies, and medication refills. Established New Doctor and Hypertension      HPI: Patient is here for follow-up on hypertension was initially high, repeat blood pressure is normal patient is on amlodipine reports compliance denies any side effects. Prediabetes stable on diet control. Patient is on lifestyle changes and lost weight, reports he gained it back. Obesity getting worse patient has gained about 15 pounds. Patient reports he was working out and stopped his exercise. Bipolar disorder is on multiple medications follows with psychiatrist.      Erectile dysfunction takes Viagra as needed. Patient has recurrent history of herpes genitalis reports he takes valacyclovir only during episodes. Last flareup was in 2019. /80   Pulse 71   Temp 99.1 °F (37.3 °C) (Temporal)   Ht 5' 10\" (1.778 m)   Wt 239 lb (108.4 kg)   SpO2 98%   BMI 34.29 kg/m²    Body mass index is 34.29 kg/m². Wt Readings from Last 3 Encounters:   11/15/21 239 lb (108.4 kg)   10/13/21 230 lb (104.3 kg)   07/01/21 230 lb (104.3 kg)        [x]Negative depression screening. PHQ Scores 2/26/2021 3/18/2019 2/18/2019 9/6/2018 5/29/2018   PHQ2 Score 0 0 0 0 0   PHQ9 Score 0 0 0 0 0      []1-4 = Minimal depression   []5-9 = Milddepression   []10-14 = Moderate depression   []15-19 = Moderately severe depression   []20-27 = Severe depression    Discussed testing with the patient and all questions fully answered.     Hospital Outpatient Visit on 06/30/2021   Component Date Value Ref Range Status    Status 06/30/2021 Interpreted   Final         Most recent labs reviewed:     Lab Results   Component Value Date    WBC 3.8 12/03/2020    HGB 15.4 12/03/2020    HCT 45.2 12/03/2020    MCV 84.9 12/03/2020     12/03/2020       @BRIEFLAB(NA,K,CL,CO2,BUN,CREATININE,GLUCOSE,CALCIUM)@     Lab Results   Component Value Date    ALT 14 01/09/2020    AST 18 01/09/2020    ALKPHOS 58 01/09/2020    BILITOT 0.68 01/09/2020       Lab Results   Component Value Date    TSH 1.19 01/09/2020       Lab Results   Component Value Date    CHOL 174 12/03/2020    CHOL 185 09/07/2018    CHOL 190 06/04/2018     Lab Results   Component Value Date    TRIG 58 12/03/2020    TRIG 62 09/07/2018    TRIG 54 06/04/2018     Lab Results   Component Value Date    HDL 53 12/03/2020    HDL 55 09/07/2018    HDL 63 06/04/2018     Lab Results   Component Value Date    LDLCHOLESTEROL 109 12/03/2020    LDLCHOLESTEROL 118 09/07/2018    LDLCHOLESTEROL 116 06/04/2018     Lab Results   Component Value Date    VLDL NOT REPORTED 12/03/2020    VLDL NOT REPORTED 09/07/2018    VLDL NOT REPORTED 06/04/2018     Lab Results   Component Value Date    CHOLHDLRATIO 3.3 12/03/2020    CHOLHDLRATIO 3.4 09/07/2018    CHOLHDLRATIO 3.0 06/04/2018       Lab Results   Component Value Date    LABA1C 5.6 11/15/2021       Lab Results   Component Value Date    XIGAAMFT34 276 02/12/2019       Lab Results   Component Value Date    FOLATE >20.0 02/12/2019       Lab Results   Component Value Date    IRON 91 02/12/2019    TIBC 260 02/12/2019       No results found for: VITD25          Current Outpatient Medications   Medication Sig Dispense Refill    valACYclovir (VALTREX) 500 MG tablet TAKE 1 TABLET BY MOUTH ONE TIME A DAY. start within 72 hours of symptom onset 90 tablet 1    sildenafil (VIAGRA) 100 MG tablet TAKE 1 TABLET BY MOUTH ONE TIME A DAY AS NEEDED FOR ERECTILE DYSFUNCTION 15 tablet 5    amLODIPine (NORVASC) 10 MG tablet TAKE 1 TABLET BY MOUTH ONE TIME A DAY 90 tablet 1    aspirin 81 MG EC tablet TAKE 1 TABLET BY MOUTH ONE TIME A DAY 90 tablet 3    amphetamine-dextroamphetamine (ADDERALL XR) 20 MG extended release capsule Take 2 capsules by mouth daily.       lamoTRIgine (LAMICTAL) 25 MG tablet take 1 tab daily X 2 weeks, then 2 tabs daily in am      sertraline (ZOLOFT) 50 MG tablet Take 50 mg by mouth daily       EPINEPHrine (EPIPEN 2-LAURA) 0.3 MG/0.3ML SOAJ injection Inject 0.3 mLs into the muscle once as needed (severe allergic reaction) 2 each 1     No current facility-administered medications for this visit. Social History     Socioeconomic History    Marital status:      Spouse name: Not on file    Number of children: Not on file    Years of education: Not on file    Highest education level: Not on file   Occupational History    Not on file   Tobacco Use    Smoking status: Former Smoker     Packs/day: 0.50     Years: 15.00     Pack years: 7.50     Types: Cigarettes     Quit date: 2015     Years since quittin.8    Smokeless tobacco: Never Used    Tobacco comment: quit    Vaping Use    Vaping Use: Never used   Substance and Sexual Activity    Alcohol use: No     Comment: none since     Drug use: No    Sexual activity: Yes     Partners: Female   Other Topics Concern    Not on file   Social History Narrative    Not on file     Social Determinants of Health     Financial Resource Strain: Low Risk     Difficulty of Paying Living Expenses: Not hard at all   Food Insecurity: No Food Insecurity    Worried About [address] in the Last Year: Never true    [address]  in the Last Year: Never true   Transportation Needs:     Lack of Transportation (Medical): Not on file    Lack of Transportation (Non-Medical):  Not on file   Physical Activity:     Days of Exercise per Week: Not on file    Minutes of Exercise per Session: Not on file   Stress:     Feeling of Stress : Not on file   Social Connections:     Frequency of Communication with Friends and Family: Not on file    Frequency of Social Gatherings with Friends and Family: Not on file    Attends Worship Services: Not on file    Active Member of Clubs or Organizations: Not on file    Attends Club or Organization Meetings: Not on file    Marital Status: Not on file   Intimate Partner Violence:     Fear of Current or Ex-Partner: Not on file    Emotionally Abused: Not on file    Physically Abused: Not on file    Sexually Abused: Not on file   Housing Stability:     Unable to Pay for Housing in the Last Year: Not on file    Number of [place] in the Last Year: Not on file    Unstable Housing in the Last Year: Not on file     Counseling given: Yes  Comment: quit 2015        Family History   Problem Relation Age of Onset    Hypertension Mother     Heart Disease Mother     Diabetes Mother     High Cholesterol Mother    Heber Olvera Father         leukemia    Hypertension Sister         Sisters are twins    Hypertension Brother     Heart Disease Brother              -rest of complaints with corresponding details per ROS    The patient's past medical, surgical, social, and family history as well as his current medications and allergies were reviewed as documented intoday's encounter. Review of Systems   Constitutional: Positive for unexpected weight change. Negative for activity change, diaphoresis, fatigue and fever. HENT: Negative for congestion, nosebleeds, postnasal drip, sinus pressure, sinus pain and tinnitus. Eyes: Negative for photophobia and visual disturbance. Respiratory: Negative for cough, chest tightness, shortness of breath and wheezing. Cardiovascular: Negative for chest pain, palpitations and leg swelling. Gastrointestinal: Negative for abdominal distention, abdominal pain, anal bleeding and constipation. Endocrine: Negative for polyuria. Genitourinary: Negative for difficulty urinating, flank pain, frequency and urgency. Musculoskeletal: Negative for arthralgias, back pain, gait problem and neck pain. Skin: Negative for rash and wound.    Neurological: Negative for dizziness, speech difficulty, weakness, light-headedness, numbness and headaches. Psychiatric/Behavioral: Negative for agitation, decreased concentration, dysphoric mood and hallucinations. The patient is nervous/anxious. Physical Exam    PHYSICAL EXAM:   VITALS:   Vitals:    11/15/21 0902   BP: 124/80   Pulse:    Temp:    SpO2:      GENERAL:  Patient is a well-developed, well-nourished male  in no acute distress, alert and oriented x3, appropriate and pleasant conversation. HEAD: Normocephalic, atraumatic. EYES: Pupils equal, round and reactive to light and accommodation, extraocular   movements intact. ENT: Moist mucous membranes. No erythema is noted. NECK: Supple. No masses. No lymphadenopathy. CARDIOVASCULAR: Regular rate and rhythm. PULMONARY: Lungs are clear to auscultation bilaterally. ABDOMEN: Soft, nontender, nondistended. Positive bowel sounds. MUSCULOSKELETAL: Strength 5/5 bilaterally in all extremities. No tenderness to   palpation of the ribs, long bones, or spine. NEUROLOGIC: Cranial nerves II through XII grossly intact. No focal deficits are noted. ASSESSMENT AND PLAN      1. Essential hypertension  Controlled continue same medications, continue weight reduction  - amLODIPine (NORVASC) 10 MG tablet; TAKE 1 TABLET BY MOUTH ONE TIME A DAY  Dispense: 90 tablet; Refill: 1  - Comprehensive Metabolic Panel; Future  - CBC Auto Differential; Future    2. Prediabetes  A1c is improved continue weight reduction. Continue to monitor blood sugars  - POCT glycosylated hemoglobin (Hb A1C)    3. Class 1 obesity due to excess calories with serious comorbidity and body mass index (BMI) of 34.0 to 34.9 in adult  Worsening discussed with patient continue lifestyle changes. - CBC Auto Differential; Future  - Lipid Panel; Future  - Vitamin D 25 Hydroxy; Future    4. Bipolar disorder, in partial remission, most recent episode depressed ([facility] [address]  Stable continue to follow with psychiatrist    5.  Erectile dysfunction, unspecified erectile dysfunction type  Refill medications  - sildenafil (VIAGRA) 100 MG tablet; TAKE 1 TABLET BY MOUTH ONE TIME A DAY AS NEEDED FOR ERECTILE DYSFUNCTION  Dispense: 15 tablet; Refill: 5    6. Herpes genitalis in men  Continue Valtrex as needed  - valACYclovir (VALTREX) 500 MG tablet; TAKE 1 TABLET BY MOUTH ONE TIME A DAY. start within 72 hours of symptom onset  Dispense: 90 tablet; Refill: 1    7. Attention deficit hyperactivity disorder (ADHD), combined type  Continue Adderall follow-up with psychiatrist      Orders Placed This Encounter   Procedures    Comprehensive Metabolic Panel     Fasting 8 hrs     Standing Status:   Future     Standing Expiration Date:   11/15/2022    CBC Auto Differential     Standing Status:   Future     Standing Expiration Date:   11/16/2022    Lipid Panel     Standing Status:   Future     Standing Expiration Date:   11/15/2022     Order Specific Question:   Is Patient Fasting?/# of Hours     Answer:   yes, 8-10 hours    Vitamin D 25 Hydroxy     Standing Status:   Future     Standing Expiration Date:   11/15/2022    POCT glycosylated hemoglobin (Hb A1C)         Medications Discontinued During This Encounter   Medication Reason    amLODIPine (NORVASC) 10 MG tablet REORDER    valACYclovir (VALTREX) 500 MG tablet REORDER    sildenafil (VIAGRA) 100 MG tablet Erskin Nett received counseling on the following healthy behaviors: nutrition, exercise and medication adherence  Reviewed prior labs and health maintenance  Continue current medications, diet and exercise. Discussed use, benefit, and side effects of prescribed medications. Barriers to medication compliance addressed. Patient given educational materials - see patient instructions  Was a self-tracking handout given in paper form or via [employer]? Yes    Requested Prescriptions     Signed Prescriptions Disp Refills    valACYclovir (VALTREX) 500 MG tablet 90 tablet 1     Sig: TAKE 1 TABLET BY MOUTH ONE TIME A DAY.  start within 72 hours ensure accuracy, no guarantees can be provided that every mistake has been identified and corrected by editing.   Electronically signed by Isabel Astorga MD on 11/15/2021  9:20 AM

## 2021-11-15 NOTE — PROGRESS NOTES
Visit Information    Have you changed or started any medications since your last visit including any over-the-counter medicines, vitamins, or herbal medicines? no   Are you having any side effects from any of your medications? -  no  Have you stopped taking any of your medications? Is so, why? -  no    Have you seen any other physician or provider since your last visit? No  Have you had any other diagnostic tests since your last visit? No  Have you been seen in the emergency room and/or had an admission to a hospital since we last saw you? No  Have you had your routine dental cleaning in the past 6 months? yes -     Have you activated your [employer] account? If not, what are your barriers?  Yes     Patient Care Team:  HARRY Sol CNP as PCP - General (Family Nurse Practitioner)  Rajeev Matthew MD as Consulting Physician (Hematology and Oncology)  HARRY Bryant CNP as Consulting Physician (Nurse Practitioner)    Medical History Review  Past Medical, Family, and Social History reviewed and does contribute to the patient presenting condition    Health Maintenance   Topic Date Due    Shingles Vaccine (1 of 2) Never done   [employer] Visit (AWV)  Never done    Flu vaccine (1) 09/01/2021    COVID-19 Vaccine (3 - Booster for [facility] [place] series) 10/26/2021    A1C test (Diabetic or Prediabetic)  12/03/2021    Potassium monitoring  12/03/2021    Creatinine monitoring  12/03/2021    Colon cancer screen fecal DNA test (Cologuard)  10/11/2022    Lipid screen  12/03/2025    DTaP/Tdap/Td vaccine (2 - Td or Tdap) 05/29/2028    Hepatitis C screen  Completed    HIV screen  Completed    Hepatitis A vaccine  Aged Out    Hepatitis B vaccine  Aged Out    Hib vaccine  Aged Out    Meningococcal (ACWY) vaccine  Aged Out    Pneumococcal 0-64 years Vaccine  Aged Farhan

## 2021-11-18 ENCOUNTER — HOSPITAL ENCOUNTER (OUTPATIENT)
Age: 51
Setting detail: SPECIMEN
Discharge: HOME | End: 2021-11-18
Payer: MEDICARE

## 2021-11-18 LAB
ABSOLUTE BANDS #: 0.08 K/UL (ref 0–1)
ABSOLUTE EOS #: 0.23 K/UL (ref 0–0.4)
ABSOLUTE IMMATURE GRANULOCYTE: ABNORMAL K/UL (ref 0–0.3)
ABSOLUTE LYMPH #: 1.37 K/UL (ref 1–4.8)
ABSOLUTE MONO #: 0.3 K/UL (ref 0.1–1.3)
ALBUMIN SERPL-MCNC: 4.3 G/DL (ref 3.5–5.2)
ALBUMIN/GLOBULIN RATIO: ABNORMAL (ref 1–2.5)
ALP BLD-CCNC: 68 U/L (ref 40–129)
ALT SERPL-CCNC: 28 U/L (ref 5–41)
ANION GAP SERPL CALCULATED.3IONS-SCNC: 11 MMOL/L (ref 9–17)
AST SERPL-CCNC: 28 U/L
BANDS: 2 % (ref 0–10)
BASOPHILS # BLD: 0 % (ref 0–2)
BASOPHILS ABSOLUTE: 0 K/UL (ref 0–0.2)
BILIRUB SERPL-MCNC: 0.56 MG/DL (ref 0.3–1.2)
BUN BLDV-MCNC: 16 MG/DL (ref 6–20)
BUN/CREAT BLD: ABNORMAL (ref 9–20)
CALCIUM SERPL-MCNC: 9.4 MG/DL (ref 8.6–10.4)
CHLORIDE BLD-SCNC: 103 MMOL/L (ref 98–107)
CHOLESTEROL/HDL RATIO: 3.1
CHOLESTEROL: 172 MG/DL
CO2: 25 MMOL/L (ref 20–31)
CREAT SERPL-MCNC: 1.26 MG/DL (ref 0.7–1.2)
DIFFERENTIAL TYPE: ABNORMAL
EOSINOPHILS RELATIVE PERCENT: 6 % (ref 0–4)
GFR AFRICAN AMERICAN: >60 ML/MIN
GFR NON-AFRICAN AMERICAN: 60 ML/MIN
GFR SERPL CREATININE-BSD FRML MDRD: ABNORMAL ML/MIN/{1.73_M2}
GFR SERPL CREATININE-BSD FRML MDRD: ABNORMAL ML/MIN/{1.73_M2}
GLUCOSE BLD-MCNC: 108 MG/DL (ref 70–99)
HCT VFR BLD CALC: 43.2 % (ref 41–53)
HDLC SERPL-MCNC: 55 MG/DL
HEMOGLOBIN: 14.8 G/DL (ref 13.5–17.5)
IMMATURE GRANULOCYTES: ABNORMAL %
LDL CHOLESTEROL: 103 MG/DL (ref 0–130)
LYMPHOCYTES # BLD: 36 % (ref 24–44)
MCH RBC QN AUTO: 29.3 PG (ref 26–34)
MCHC RBC AUTO-ENTMCNC: 34.4 G/DL (ref 31–37)
MCV RBC AUTO: 85.1 FL (ref 80–100)
MONOCYTES # BLD: 8 % (ref 1–7)
MORPHOLOGY: NORMAL
NRBC AUTOMATED: ABNORMAL PER 100 WBC
PDW BLD-RTO: 13.7 % (ref 11.5–14.9)
PLATELET # BLD: 228 K/UL (ref 150–450)
PLATELET ESTIMATE: ABNORMAL
PMV BLD AUTO: 7.2 FL (ref 6–12)
POTASSIUM SERPL-SCNC: 4.1 MMOL/L (ref 3.7–5.3)
RBC # BLD: 5.07 M/UL (ref 4.5–5.9)
RBC # BLD: ABNORMAL 10*6/UL
SEG NEUTROPHILS: 48 % (ref 36–66)
SEGMENTED NEUTROPHILS ABSOLUTE COUNT: 1.82 K/UL (ref 1.3–9.1)
SODIUM BLD-SCNC: 139 MMOL/L (ref 135–144)
TOTAL PROTEIN: 7.5 G/DL (ref 6.4–8.3)
TRIGL SERPL-MCNC: 70 MG/DL
VITAMIN D 25-HYDROXY: 20.9 NG/ML (ref 30–100)
VLDLC SERPL CALC-MCNC: NORMAL MG/DL (ref 1–30)
WBC # BLD: 3.8 K/UL (ref 3.5–11)
WBC # BLD: ABNORMAL 10*3/UL

## 2021-11-18 PROCEDURE — 80061 LIPID PANEL: CPT

## 2021-11-18 PROCEDURE — 36415 COLL VENOUS BLD VENIPUNCTURE: CPT

## 2021-11-18 PROCEDURE — 80053 COMPREHEN METABOLIC PANEL: CPT

## 2021-11-18 PROCEDURE — 85025 COMPLETE CBC W/AUTO DIFF WBC: CPT

## 2021-11-18 PROCEDURE — 82306 VITAMIN D 25 HYDROXY: CPT

## 2021-11-22 DIAGNOSIS — E55.9 VITAMIN D DEFICIENCY: Primary | ICD-10-CM

## 2021-11-22 RX ORDER — ERGOCALCIFEROL 1.25 MG/1
50000 CAPSULE ORAL WEEKLY
Qty: 12 CAPSULE | Refills: 1 | Status: SHIPPED | OUTPATIENT
Start: 2021-11-22 | End: 2022-02-14 | Stop reason: SDUPTHER

## 2021-11-22 RX ORDER — ERGOCALCIFEROL 1.25 MG/1
50000 CAPSULE ORAL WEEKLY
Qty: 12 CAPSULE | Refills: 1 | Status: CANCELLED | OUTPATIENT
Start: 2021-11-22

## 2022-02-14 ENCOUNTER — OFFICE VISIT (OUTPATIENT)
Dept: FAMILY MEDICINE CLINIC | Age: 52
End: 2022-02-14
Payer: MEDICARE

## 2022-02-14 ENCOUNTER — TELEPHONE (OUTPATIENT)
Dept: FAMILY MEDICINE CLINIC | Age: 52
End: 2022-02-14

## 2022-02-14 VITALS
HEIGHT: 70 IN | OXYGEN SATURATION: 98 % | HEART RATE: 86 BPM | DIASTOLIC BLOOD PRESSURE: 80 MMHG | WEIGHT: 242 LBS | SYSTOLIC BLOOD PRESSURE: 138 MMHG | TEMPERATURE: 98.3 F | BODY MASS INDEX: 34.65 KG/M2

## 2022-02-14 DIAGNOSIS — F31.75 BIPOLAR DISORDER, IN PARTIAL REMISSION, MOST RECENT EPISODE DEPRESSED (HCC): ICD-10-CM

## 2022-02-14 DIAGNOSIS — R73.03 PREDIABETES: ICD-10-CM

## 2022-02-14 DIAGNOSIS — E66.09 CLASS 1 OBESITY DUE TO EXCESS CALORIES WITH SERIOUS COMORBIDITY AND BODY MASS INDEX (BMI) OF 31.0 TO 31.9 IN ADULT: ICD-10-CM

## 2022-02-14 DIAGNOSIS — I10 ESSENTIAL HYPERTENSION: Primary | ICD-10-CM

## 2022-02-14 DIAGNOSIS — F90.2 ATTENTION DEFICIT HYPERACTIVITY DISORDER (ADHD), COMBINED TYPE: ICD-10-CM

## 2022-02-14 DIAGNOSIS — Z23 NEED FOR VACCINATION: ICD-10-CM

## 2022-02-14 DIAGNOSIS — G47.33 OSA ON CPAP: ICD-10-CM

## 2022-02-14 DIAGNOSIS — N28.9 RENAL INSUFFICIENCY: ICD-10-CM

## 2022-02-14 DIAGNOSIS — E55.9 VITAMIN D DEFICIENCY: ICD-10-CM

## 2022-02-14 DIAGNOSIS — Z99.89 OSA ON CPAP: ICD-10-CM

## 2022-02-14 PROBLEM — D72.819 LEUKOPENIA: Status: RESOLVED | Noted: 2018-12-06 | Resolved: 2022-02-14

## 2022-02-14 PROCEDURE — G0008 ADMIN INFLUENZA VIRUS VAC: HCPCS | Performed by: FAMILY MEDICINE

## 2022-02-14 PROCEDURE — G8417 CALC BMI ABV UP PARAM F/U: HCPCS | Performed by: FAMILY MEDICINE

## 2022-02-14 PROCEDURE — 90674 CCIIV4 VAC NO PRSV 0.5 ML IM: CPT | Performed by: FAMILY MEDICINE

## 2022-02-14 PROCEDURE — 3017F COLORECTAL CA SCREEN DOC REV: CPT | Performed by: FAMILY MEDICINE

## 2022-02-14 PROCEDURE — 1036F TOBACCO NON-USER: CPT | Performed by: FAMILY MEDICINE

## 2022-02-14 PROCEDURE — G8482 FLU IMMUNIZE ORDER/ADMIN: HCPCS | Performed by: FAMILY MEDICINE

## 2022-02-14 PROCEDURE — G8427 DOCREV CUR MEDS BY ELIG CLIN: HCPCS | Performed by: FAMILY MEDICINE

## 2022-02-14 PROCEDURE — 99214 OFFICE O/P EST MOD 30 MIN: CPT | Performed by: FAMILY MEDICINE

## 2022-02-14 RX ORDER — ERGOCALCIFEROL 1.25 MG/1
50000 CAPSULE ORAL WEEKLY
Qty: 12 CAPSULE | Refills: 3 | Status: SHIPPED | OUTPATIENT
Start: 2022-02-14

## 2022-02-14 ASSESSMENT — PATIENT HEALTH QUESTIONNAIRE - PHQ9
SUM OF ALL RESPONSES TO PHQ9 QUESTIONS 1 & 2: 0
1. LITTLE INTEREST OR PLEASURE IN DOING THINGS: 0
SUM OF ALL RESPONSES TO PHQ QUESTIONS 1-9: 0
2. FEELING DOWN, DEPRESSED OR HOPELESS: 0
SUM OF ALL RESPONSES TO PHQ QUESTIONS 1-9: 0

## 2022-02-14 ASSESSMENT — ENCOUNTER SYMPTOMS
SORE THROAT: 0
SINUS PRESSURE: 0
CONSTIPATION: 0
ABDOMINAL DISTENTION: 0
COUGH: 0
CHEST TIGHTNESS: 0
SHORTNESS OF BREATH: 0
BACK PAIN: 0
ABDOMINAL PAIN: 0
WHEEZING: 0

## 2022-02-14 NOTE — PROGRESS NOTES
Chief Complaint   Patient presents with    Hypertension    Hyperlipidemia         Vishnu Santana  here today for follow up on chronic medical problems, go over labs and/or diagnostic studies, and medication refills. Hypertension and Hyperlipidemia      HPI: Patient is scheduled for follow-up on lab work and hypertension. Hypertension controlled on current treatment denies any chest pain shortness of breath. Patient is compliant with medications. Prediabetes on diet control previous A1c is normal.    Patient had blood work done that showed mildly elevated creatinine, discussed with patient keep yourself hydrated. Avoid NSAIDs. Sleep apnea on CPAP reports compliance. Bipolar disorder follows with psychiatrist stable symptoms. ADHD on Adderall follows with psychiatrist denies any side effects, denies any illicit drug use. Obesity worsening patient has gained few pounds reports he is trying to watch his diet and does physical activity. /80   Pulse 86   Temp 98.3 °F (36.8 °C)   Ht 5' 10\" (1.778 m)   Wt 242 lb (109.8 kg)   SpO2 98%   BMI 34.72 kg/m²    Body mass index is 34.72 kg/m². Wt Readings from Last 3 Encounters:   02/14/22 242 lb (109.8 kg)   11/15/21 239 lb (108.4 kg)   10/13/21 230 lb (104.3 kg)        [x]Negative depression screening. PHQ Scores 2/14/2022 2/26/2021 3/18/2019 2/18/2019 9/6/2018 5/29/2018   PHQ2 Score 0 0 0 0 0 0   PHQ9 Score 0 0 0 0 0 0      []1-4 = Minimal depression   []5-9 = Milddepression   []10-14 = Moderate depression   []15-19 = Moderately severe depression   []20-27 = Severe depression    Discussed testing with the patient and all questions fully answered.     Hospital Outpatient Visit on 11/18/2021   Component Date Value Ref Range Status    WBC 11/18/2021 3.8  3.5 - 11.0 k/uL Final    RBC 11/18/2021 5.07  4.5 - 5.9 m/uL Final    Hemoglobin 11/18/2021 14.8  13.5 - 17.5 g/dL Final    Hematocrit 11/18/2021 43.2  41 - 53 % Final  MCV 11/18/2021 85.1  80 - 100 fL Final    MCH 11/18/2021 29.3  26 - 34 pg Final    MCHC 11/18/2021 34.4  31 - 37 g/dL Final    RDW 11/18/2021 13.7  11.5 - 14.9 % Final    Platelets 49/83/8357 228  150 - 450 k/uL Final    MPV 11/18/2021 7.2  6.0 - 12.0 fL Final    NRBC Automated 11/18/2021 NOT REPORTED  per 100 WBC Final    Differential Type 11/18/2021 NOT REPORTED   Final    Immature Granulocytes 11/18/2021 NOT REPORTED  0 % Final    Absolute Immature Granulocyte 11/18/2021 NOT REPORTED  0.00 - 0.30 k/uL Final    WBC Morphology 11/18/2021 NOT REPORTED   Final    RBC Morphology 11/18/2021 NOT REPORTED   Final    Platelet Estimate 51/01/4086 NOT REPORTED   Final    Seg Neutrophils 11/18/2021 48  36 - 66 % Final    Lymphocytes 11/18/2021 36  24 - 44 % Final    Monocytes 11/18/2021 8* 1 - 7 % Final    Eosinophils % 11/18/2021 6* 0 - 4 % Final    Basophils 11/18/2021 0  0 - 2 % Final    Bands 11/18/2021 2  0 - 10 % Final    Segs Absolute 11/18/2021 1.82  1.3 - 9.1 k/uL Final    Absolute Lymph # 11/18/2021 1.37  1.0 - 4.8 k/uL Final    Absolute Mono # 11/18/2021 0.30  0.1 - 1.3 k/uL Final    Absolute Eos # 11/18/2021 0.23  0.0 - 0.4 k/uL Final    Basophils Absolute 11/18/2021 0.00  0.0 - 0.2 k/uL Final    Absolute Bands # 11/18/2021 0.08  0.0 - 1.0 k/uL Final    Morphology 11/18/2021 Normal   Final    Glucose 11/18/2021 108* 70 - 99 mg/dL Final    BUN 11/18/2021 16  6 - 20 mg/dL Final    CREATININE 11/18/2021 1.26* 0.70 - 1.20 mg/dL Final    Bun/Cre Ratio 11/18/2021 NOT REPORTED  9 - 20 Final    Calcium 11/18/2021 9.4  8.6 - 10.4 mg/dL Final    Sodium 11/18/2021 139  135 - 144 mmol/L Final    Potassium 11/18/2021 4.1  3.7 - 5.3 mmol/L Final    Chloride 11/18/2021 103  98 - 107 mmol/L Final    CO2 11/18/2021 25  20 - 31 mmol/L Final    Anion Gap 11/18/2021 11  9 - 17 mmol/L Final    Alkaline Phosphatase 11/18/2021 68  40 - 129 U/L Final    ALT 11/18/2021 28  5 - 41 U/L Final    AST 11/18/2021 28  <40 U/L Final    Total Bilirubin 11/18/2021 0.56  0.3 - 1.2 mg/dL Final    Total Protein 11/18/2021 7.5  6.4 - 8.3 g/dL Final    Albumin 11/18/2021 4.3  3.5 - 5.2 g/dL Final    Albumin/Globulin Ratio 11/18/2021 NOT REPORTED  1.0 - 2.5 Final    GFR Non- 11/18/2021 60* >60 mL/min Final    GFR  11/18/2021 >60  >60 mL/min Final    GFR Comment 11/18/2021        Final    Comment: Average GFR for 52-63 years old:   80 mL/min/1.73sq m  Chronic Kidney Disease:   <60 mL/min/1.73sq m  Kidney failure:   <15 mL/min/1.73sq m              eGFR calculated using average adult body mass. Additional eGFR calculator available at:        [email or web address]            GFR Staging 11/18/2021 NOT REPORTED   Final    Cholesterol 11/18/2021 172  <200 mg/dL Final    Comment:    Cholesterol Guidelines:      <200  Desirable   200-240  Borderline      >240  Undesirable         HDL 11/18/2021 55  >40 mg/dL Final    Comment:    HDL Guidelines:    <40     Undesirable   40-59    Borderline    >59     Desirable         LDL Cholesterol 11/18/2021 103  0 - 130 mg/dL Final    Comment:    LDL Guidelines:     <100    Desirable   100-129   Near to/above Desirable   130-159   Borderline      >159   Undesirable     Direct (measured) LDL and calculated LDL are not interchangeable tests.  Chol/HDL Ratio 11/18/2021 3.1  <5 Final            Triglycerides 11/18/2021 70  <150 mg/dL Final    Comment:    Triglyceride Guidelines:     <150   Desirable   150-199  Borderline   200-499  High     >499   Very high   Based on AHA Guidelines for fasting triglyceride, October 2012.          VLDL 11/18/2021 NOT REPORTED  1 - 30 mg/dL Final    Vit D, 25-Hydroxy 11/18/2021 20.9* 30.0 - 100.0 ng/mL Final    Comment:    Reference Range:  Vitamin D status         Range   Deficiency              <20 ng/mL   Mild Deficiency       20-30 ng/mL   Sufficiency           ng/mL   Toxicity >100 ng/mL           Most recent labs reviewed:     Lab Results   Component Value Date    WBC 3.8 11/18/2021    HGB 14.8 11/18/2021    HCT 43.2 11/18/2021    MCV 85.1 11/18/2021     11/18/2021       @BRIEFLAB(NA,K,CL,CO2,BUN,CREATININE,GLUCOSE,CALCIUM)@     Lab Results   Component Value Date    ALT 28 11/18/2021    AST 28 11/18/2021    ALKPHOS 68 11/18/2021    BILITOT 0.56 11/18/2021       Lab Results   Component Value Date    TSH 1.19 01/09/2020       Lab Results   Component Value Date    CHOL 172 11/18/2021    CHOL 174 12/03/2020    CHOL 185 09/07/2018     Lab Results   Component Value Date    TRIG 70 11/18/2021    TRIG 58 12/03/2020    TRIG 62 09/07/2018     Lab Results   Component Value Date    HDL 55 11/18/2021    HDL 53 12/03/2020    HDL 55 09/07/2018     Lab Results   Component Value Date    LDLCHOLESTEROL 103 11/18/2021    LDLCHOLESTEROL 109 12/03/2020    LDLCHOLESTEROL 118 09/07/2018     Lab Results   Component Value Date    VLDL NOT REPORTED 11/18/2021    VLDL NOT REPORTED 12/03/2020    VLDL NOT REPORTED 09/07/2018     Lab Results   Component Value Date    CHOLHDLRATIO 3.1 11/18/2021    CHOLHDLRATIO 3.3 12/03/2020    CHOLHDLRATIO 3.4 09/07/2018       Lab Results   Component Value Date    LABA1C 5.6 11/15/2021       Lab Results   Component Value Date    ENFLGCLN55 627 02/12/2019       Lab Results   Component Value Date    FOLATE >20.0 02/12/2019       Lab Results   Component Value Date    IRON 91 02/12/2019    TIBC 260 02/12/2019       Lab Results   Component Value Date    VITD25 20.9 (L) 11/18/2021             Current Outpatient Medications   Medication Sig Dispense Refill    zoster recombinant adjuvanted vaccine (SHINGRIX) 50 MCG/0.5ML SUSR injection Inject 0.5 mLs into the muscle See Admin Instructions 1 dose now and repeat in 2-6 months 0.5 mL 0    vitamin D (ERGOCALCIFEROL) 1.25 MG (45699 UT) CAPS capsule Take 1 capsule by mouth once a week 12 capsule 3    valACYclovir (VALTREX) 500 MG tablet TAKE 1 TABLET BY MOUTH ONE TIME A DAY. start within 72 hours of symptom onset 90 tablet 1    sildenafil (VIAGRA) 100 MG tablet TAKE 1 TABLET BY MOUTH ONE TIME A DAY AS NEEDED FOR ERECTILE DYSFUNCTION 15 tablet 5    amLODIPine (NORVASC) 10 MG tablet TAKE 1 TABLET BY MOUTH ONE TIME A DAY 90 tablet 1    aspirin 81 MG EC tablet TAKE 1 TABLET BY MOUTH ONE TIME A DAY 90 tablet 3    amphetamine-dextroamphetamine (ADDERALL XR) 20 MG extended release capsule Take 2 capsules by mouth daily.  lamoTRIgine (LAMICTAL) 25 MG tablet take 1 tab daily X 2 weeks, then 2 tabs daily in am      sertraline (ZOLOFT) 50 MG tablet Take 50 mg by mouth daily       EPINEPHrine (EPIPEN 2-LAURA) 0.3 MG/0.3ML SOAJ injection Inject 0.3 mLs into the muscle once as needed (severe allergic reaction) 2 each 1     No current facility-administered medications for this visit.              Social History     Socioeconomic History    Marital status:      Spouse name: Not on file    Number of children: Not on file    Years of education: Not on file    Highest education level: Not on file   Occupational History    Not on file   Tobacco Use    Smoking status: Former Smoker     Packs/day: 0.50     Years: 15.00     Pack years: 7.50     Types: Cigarettes     Quit date: 2015     Years since quittin.1    Smokeless tobacco: Never Used    Tobacco comment: quit    Vaping Use    Vaping Use: Never used   Substance and Sexual Activity    Alcohol use: No     Comment: none since     Drug use: No    Sexual activity: Yes     Partners: Female   Other Topics Concern    Not on file   Social History Narrative    Not on file     Social Determinants of Health     Financial Resource Strain: Low Risk     Difficulty of Paying Living Expenses: Not hard at all   Food Insecurity: No Food Insecurity    Worried About [address] in the Last Year: Never true    [address] [place] in the Last Year: Never true   Transportation Needs:     Lack of Transportation (Medical): Not on file    Lack of Transportation (Non-Medical): Not on file   Physical Activity:     Days of Exercise per Week: Not on file    Minutes of Exercise per Session: Not on file   Stress:     Feeling of Stress : Not on file   Social Connections:     Frequency of Communication with Friends and Family: Not on file    Frequency of Social Gatherings with Friends and Family: Not on file    Attends Hinduism Services: Not on file    Active Member of Clubs or Organizations: Not on file    Attends Club or Organization Meetings: Not on file    Marital Status: Not on file   Intimate Partner Violence:     Fear of Current or Ex-Partner: Not on file    Emotionally Abused: Not on file    Physically Abused: Not on file    Sexually Abused: Not on file   Housing Stability:     Unable to Pay for Housing in the Last Year: Not on file    Number of [place] in the Last Year: Not on file    Unstable Housing in the Last Year: Not on file     Counseling given: Not Answered  Comment: quit 2015        Family History   Problem Relation Age of Onset    Hypertension Mother     Heart Disease Mother     Diabetes Mother     High Cholesterol Mother    Yady Boyd Father         leukemia    Hypertension Sister         Sisters are twins    Hypertension Brother     Heart Disease Brother              -rest of complaints with corresponding details per ROS    The patient's past medical, surgical, social, and family history as well as his current medications and allergies were reviewed as documented intoday's encounter. Review of Systems   Constitutional: Negative for activity change, appetite change, diaphoresis, fatigue and unexpected weight change. HENT: Negative for congestion, sinus pressure and sore throat. Eyes: Negative for visual disturbance. Respiratory: Negative for cough, chest tightness, shortness of breath and wheezing.     Cardiovascular: Negative for chest pain, palpitations and leg swelling. Gastrointestinal: Negative for abdominal distention, abdominal pain and constipation. Endocrine: Negative for polyuria. Genitourinary: Negative for difficulty urinating, frequency and urgency. Musculoskeletal: Negative for arthralgias, back pain and gait problem. Neurological: Negative for dizziness, speech difficulty, weakness, numbness and headaches. Psychiatric/Behavioral: Negative for agitation, behavioral problems, decreased concentration, dysphoric mood, self-injury and sleep disturbance. The patient is nervous/anxious. The patient is not hyperactive. Physical Exam    PHYSICAL EXAM:   VITALS:   Vitals:    02/14/22 1002   BP: 138/80   Pulse: 86   Temp: 98.3 °F (36.8 °C)   SpO2: 98%     GENERAL:  Patient is a well-developed, well-nourished male  in no acute distress, alert and oriented x3, appropriate and pleasant conversation. HEAD: Normocephalic, atraumatic. EYES: Pupils equal, round and reactive to light and accommodation, extraocular   movements intact. ENT: Moist mucous membranes. No erythema is noted. NECK: Supple. No masses. No lymphadenopathy. CARDIOVASCULAR: Regular rate and rhythm. PULMONARY: Lungs are clear to auscultation bilaterally. ABDOMEN: Soft, nontender, nondistended. Positive bowel sounds. MUSCULOSKELETAL: Strength 5/5 bilaterally in all extremities. No tenderness to   palpation of the ribs, long bones, or spine. NEUROLOGIC: Cranial nerves II through XII grossly intact. No focal deficits are noted. ASSESSMENT AND PLAN      1. Essential hypertension  Controlled continue same medications monitor blood pressure at home all blood work discussed with patient    2. Prediabetes  Continue diet control continue lifestyle changes    3. Renal insufficiency  Mildly elevated creatinine encouraged to keep yourself hydrated    4.  Vitamin D deficiency  Refill vitamin D  - vitamin D (ERGOCALCIFEROL) 1.25 MG (45230 UT) CAPS capsule; Take 1 capsule by mouth once a week  Dispense: 12 capsule; Refill: 3    5. Bipolar disorder, in partial remission, most recent episode depressed ([address]  Stable follow-up with psychiatrist    6. TATE on CPAP  Stable continue using CPAP    7. Class 1 obesity due to excess calories with serious comorbidity and body mass index (BMI) of 31.0 to 31.9 in adult  Continue lifestyle changes continue diet control    8. Attention deficit hyperactivity disorder (ADHD), combined type  Stable follow-up with psychiatrist    9. Need for vaccination    - zoster recombinant adjuvanted vaccine [facility]) 50 MCG/0.5ML SUSR injection; Inject 0.5 mLs into the muscle See Admin Instructions 1 dose now and repeat in 2-6 months  Dispense: 0.5 mL; Refill: 0  - INFLUENZA, MDCK QUADV, 2 YRS AND OLDER, IM, PF, PREFILL SYR OR SDV, 0.5ML (FLUCELVAX QUADV, PF)      Orders Placed This Encounter   Procedures    INFLUENZA, MDCK QUADV, 2 YRS AND OLDER, IM, PF, PREFILL SYR OR SDV, 0.5ML (FLUCELVAX QUADV, PF)         Medications Discontinued During This Encounter   Medication Reason    vitamin D (ERGOCALCIFEROL) 1.25 MG (37265 UT) CAPS capsule REORDER       Chidi received counseling on the following healthy behaviors: nutrition, exercise and medication adherence  Reviewed prior labs and health maintenance  Continue current medications, diet and exercise. Discussed use, benefit, and side effects of prescribed medications. Barriers to medication compliance addressed. Patient given educational materials - see patient instructions  Was a self-tracking handout given in paper form or via [employer]?  Yes    Requested Prescriptions     Signed Prescriptions Disp Refills    zoster recombinant adjuvanted vaccine (SHINGRIX) 50 MCG/0.5ML SUSR injection 0.5 mL 0     Sig: Inject 0.5 mLs into the muscle See Admin Instructions 1 dose now and repeat in 2-6 months    vitamin D (ERGOCALCIFEROL) 1.25 MG (84553 UT) CAPS capsule 12 capsule 3     Sig: Take 1 capsule by mouth once a week       All patient questions answered. Patient voiced understanding. Quality Measures    Body mass index is 34.72 kg/m². Elevated. Weight control planned discussed Healthy diet and regular exercise. BP: 138/80 Blood pressure is normal. Treatment plan consists of Weight Reduction, DASH Eating Plan and No treatment change needed. Lab Results   Component Value Date    LDLCHOLESTEROL 103 11/18/2021    (goal LDL reduction with dx if diabetes is 50% LDL reduction)      PHQ Scores 2/14/2022 2/26/2021 3/18/2019 2/18/2019 9/6/2018 5/29/2018   PHQ2 Score 0 0 0 0 0 0   PHQ9 Score 0 0 0 0 0 0     Interpretation of Total Score Depression Severity: 1-4 = Minimal depression, 5-9 = Mild depression, 10-14 = Moderate depression, 15-19 = Moderately severe depression, 20-27 = Severe depression    The patient'spast medical, surgical, social, and family history as well as his   current medications and allergies were reviewed as documented in today's encounter. Medications, labs, diagnostic studies, consultations andfollow-up as documented in this encounter. Return in about 4 months (around 6/14/2022). Patient wasseen with total face to face time of 30 minutes. More than 50% of this visit was counseling and education. Future Appointments   Date Time Provider Shruti Moore   4/13/2022  3:30 PM Teresa Bearden MD [facility]   6/20/2022 11:45 AM Corrinne Burows, MD [facility]     This note was completed by using the assistance of a speech-recognition program. However, inadvertent computerized transcription errors may be present. Althoughevery effort was made to ensure accuracy, no guarantees can be provided that every mistake has been identified and corrected by editing.   Electronically signed by Corrinne Burows, MD on 2/14/2022  11:00 AM

## 2022-02-14 NOTE — TELEPHONE ENCOUNTER
Dinesh Hyatt 24 called states she filled script back in novermber for vitamin d2 and asking if you still want the vitamin d 50,0000?

## 2022-02-14 NOTE — PROGRESS NOTES
Visit Information    Have you changed or started any medications since your last visit including any over-the-counter medicines, vitamins, or herbal medicines? no   Are you having any side effects from any of your medications? -  no  Have you stopped taking any of your medications? Is so, why? -  no    Have you seen any other physician or provider since your last visit? No  Have you had any other diagnostic tests since your last visit? No  Have you been seen in the emergency room and/or had an admission to a hospital since we last saw you? No  Have you had your routine dental cleaning in the past 6 months? yes     Have you activated your [employer] account? If not, what are your barriers?  Yes     Patient Care Team:  Shawna Coronado MD as PCP - General (Family Medicine)  Shawna Coronado MD as PCP - [facility]  Sacha Lagos MD as Consulting Physician (Hematology and Oncology)  HARRY Jaeger CNP as Consulting Physician (Nurse Practitioner)    Medical History Review  Past Medical, Family, and Social History reviewed and does contribute to the patient presenting condition    Health Maintenance   Topic Date Due    Shingles Vaccine (1 of 2) Never done   [employer] Visit (AWV)  Never done    Flu vaccine (1) 09/01/2021    COVID-19 Vaccine (3 - Booster for [place] [employer] series) 09/26/2021    Depression Monitoring  02/26/2022    Colon cancer screen fecal DNA test (Cologuard)  10/11/2022    A1C test (Diabetic or Prediabetic)  11/15/2022    Potassium monitoring  11/18/2022    Creatinine monitoring  11/18/2022    Lipid screen  11/18/2026    DTaP/Tdap/Td vaccine (2 - Td or Tdap) 05/29/2028    Hepatitis C screen  Completed    HIV screen  Completed    Hepatitis A vaccine  Aged Out    Hepatitis B vaccine  Aged Out    Hib vaccine  Aged Out    Meningococcal (ACWY) vaccine  Aged Out    Pneumococcal 0-64 years Vaccine  Aged Out

## 2022-03-10 DIAGNOSIS — I10 ESSENTIAL HYPERTENSION: ICD-10-CM

## 2022-03-10 RX ORDER — AMLODIPINE BESYLATE 10 MG/1
TABLET ORAL
Qty: 90 TABLET | Refills: 0 | Status: SHIPPED | OUTPATIENT
Start: 2022-03-10 | End: 2022-05-25 | Stop reason: SDUPTHER

## 2022-03-10 NOTE — TELEPHONE ENCOUNTER
Please Approve or Refuse.   Send to Pharmacy per Pt's Request:      Next Visit Date:  6/20/2022   Last Visit Date: 2/14/2022    Hemoglobin A1C (%)   Date Value   11/15/2021 5.6   12/03/2020 5.7   06/04/2018 5.4             ( goal A1C is < 7)   BP Readings from Last 3 Encounters:   02/14/22 138/80   11/15/21 124/80   10/13/21 105/84          (goal 120/80)  BUN   Date Value Ref Range Status   11/18/2021 16 6 - 20 mg/dL Final     CREATININE   Date Value Ref Range Status   11/18/2021 1.26 (H) 0.70 - 1.20 mg/dL Final     Potassium   Date Value Ref Range Status   11/18/2021 4.1 3.7 - 5.3 mmol/L Final

## 2022-03-31 DIAGNOSIS — N52.9 ERECTILE DYSFUNCTION, UNSPECIFIED ERECTILE DYSFUNCTION TYPE: ICD-10-CM

## 2022-03-31 RX ORDER — SILDENAFIL 100 MG/1
TABLET, FILM COATED ORAL
Qty: 15 TABLET | Refills: 0 | Status: SHIPPED | OUTPATIENT
Start: 2022-03-31 | End: 2022-05-04

## 2022-03-31 NOTE — TELEPHONE ENCOUNTER
..  Please Approve or Refuse.   Send to Pharmacy per Pt's Request:      Next Visit Date:  6/20/2022   Last Visit Date: 2/14/2022    Hemoglobin A1C (%)   Date Value   11/15/2021 5.6   12/03/2020 5.7   06/04/2018 5.4             ( goal A1C is < 7)   BP Readings from Last 3 Encounters:   02/14/22 138/80   11/15/21 124/80   10/13/21 105/84          (goal 120/80)  BUN   Date Value Ref Range Status   11/18/2021 16 6 - 20 mg/dL Final     CREATININE   Date Value Ref Range Status   11/18/2021 1.26 (H) 0.70 - 1.20 mg/dL Final     Potassium   Date Value Ref Range Status   11/18/2021 4.1 3.7 - 5.3 mmol/L Final

## 2022-04-13 ENCOUNTER — OFFICE VISIT (OUTPATIENT)
Dept: PULMONOLOGY | Age: 52
End: 2022-04-13
Payer: MEDICARE

## 2022-04-13 VITALS
BODY MASS INDEX: 34.65 KG/M2 | HEART RATE: 77 BPM | WEIGHT: 242 LBS | RESPIRATION RATE: 17 BRPM | DIASTOLIC BLOOD PRESSURE: 81 MMHG | HEIGHT: 70 IN | SYSTOLIC BLOOD PRESSURE: 129 MMHG | TEMPERATURE: 97.3 F | OXYGEN SATURATION: 98 %

## 2022-04-13 DIAGNOSIS — Z99.89 OSA ON CPAP: Primary | ICD-10-CM

## 2022-04-13 DIAGNOSIS — E66.9 CLASS 1 OBESITY WITH SERIOUS COMORBIDITY AND BODY MASS INDEX (BMI) OF 31.0 TO 31.9 IN ADULT, UNSPECIFIED OBESITY TYPE: ICD-10-CM

## 2022-04-13 DIAGNOSIS — I10 ESSENTIAL HYPERTENSION: ICD-10-CM

## 2022-04-13 DIAGNOSIS — G47.33 OSA ON CPAP: Primary | ICD-10-CM

## 2022-04-13 PROCEDURE — 3017F COLORECTAL CA SCREEN DOC REV: CPT | Performed by: INTERNAL MEDICINE

## 2022-04-13 PROCEDURE — G8417 CALC BMI ABV UP PARAM F/U: HCPCS | Performed by: INTERNAL MEDICINE

## 2022-04-13 PROCEDURE — 1036F TOBACCO NON-USER: CPT | Performed by: INTERNAL MEDICINE

## 2022-04-13 PROCEDURE — G8427 DOCREV CUR MEDS BY ELIG CLIN: HCPCS | Performed by: INTERNAL MEDICINE

## 2022-04-13 PROCEDURE — 99214 OFFICE O/P EST MOD 30 MIN: CPT | Performed by: INTERNAL MEDICINE

## 2022-04-13 ASSESSMENT — SLEEP AND FATIGUE QUESTIONNAIRES
HOW LIKELY ARE YOU TO NOD OFF OR FALL ASLEEP WHEN YOU ARE A PASSENGER IN A CAR FOR AN HOUR WITHOUT A BREAK: 0
ESS TOTAL SCORE: 0
HOW LIKELY ARE YOU TO NOD OFF OR FALL ASLEEP WHILE SITTING QUIETLY AFTER LUNCH WITHOUT ALCOHOL: 0
HOW LIKELY ARE YOU TO NOD OFF OR FALL ASLEEP WHILE WATCHING TV: 0
HOW LIKELY ARE YOU TO NOD OFF OR FALL ASLEEP WHILE SITTING INACTIVE IN A PUBLIC PLACE: 0
HOW LIKELY ARE YOU TO NOD OFF OR FALL ASLEEP IN A CAR, WHILE STOPPED FOR A FEW MINUTES IN TRAFFIC: 0
HOW LIKELY ARE YOU TO NOD OFF OR FALL ASLEEP WHILE LYING DOWN TO REST IN THE AFTERNOON WHEN CIRCUMSTANCES PERMIT: 0
HOW LIKELY ARE YOU TO NOD OFF OR FALL ASLEEP WHILE SITTING AND TALKING TO SOMEONE: 0
HOW LIKELY ARE YOU TO NOD OFF OR FALL ASLEEP WHILE SITTING AND READING: 0

## 2022-04-13 NOTE — PROGRESS NOTES
REASON FOR THE CONSULTATION:  sleep apnea syndrome  Obesity  And hypertension  HISTORY OF PRESENT ILLNESS:    Elida Estrada is a 45y.o. year old male here for evaluation of sleep apnea syndrome that was diagnosed after his sleep study. His sleep apnea responding very well to the use of CPAP he uses CPAP most of the time spent time he does sleep on the sofa and forget to use the machine. Overall he is very happy with the use of the machine and has improvement in his daytime symptoms because of relief of the apnea. He does not have any coronary disease but does have hypertension which is well controlled no diabetes no thyroid dysfunction she he is overweight and trying to lose weight. He does not feel sleepy tired fatigue during the daytime. Does not smoke he does not drink no alcohol abuse no drug abuse. He is making effort to lose weight. Have had Covid vacs  . History of depression which is well controlled no suicidal tendency    LUNG CANCER SCREENING     1. CRITERIA MET    []     CT ORDERED  []      2. CRITERIA NOT MET   [x]      3. REFUSED                    []        REASON CRITERIA NOT MET     1. SMOKING LESS THAN 30 PY  []      2. AGE LESS THAN 55 or GREATER 77 YEARS  []      3. QUIT SMOKING 15 YEARS OR GREATER   []      4. RECENT CT WITH IN 11 MONTHS    []      5. LIFE EXPECTANCY < 5 YEARS   []      6.  SIGNS  AND SYMPTOMS OF LUNG CANCER   []         Immunization   Immunization History   Administered Date(s) Administered    COVID-19Roderick, Primary or Immunocompromised, PF, 100mcg/0.5mL 03/29/2021, 04/26/2021, 11/16/2021    COVID-19, [place] Vaccine, Vaccine Unspecified 03/29/2021, 04/26/2021    Influenza Virus Vaccine 11/01/2011    Influenza, MDCK Quadv, IM, PF (Flucelvax 2 yrs and older) 02/14/2022    Influenza, Quadv, IM, (6 mo and older Fluzone, Flulaval, Fluarix and 3 yrs and older Afluria) 11/01/2011, 09/18/2019    Influenza, Quadv, IM, PF (6 mo and older Fluzone, Flulaval, Fluarix, and 3 yrs and older Afluria) 09/14/2018    Td vaccine (adult) 02/15/2003    Tdap (Boostrix, Adacel) 05/29/2018        Pneumococcal Vaccine     [x] Up to date    [] Indicated   [] Refused  [] Contraindicated       Influenza Vaccine   [x] Up to date    [] Indicated   [] Refused  [] Contraindicated          PAST MEDICAL HISTORY:       Diagnosis Date    Bipolar 1 disorder ([facility] [address]     Depression     prev on zoloft , abilify     Hypertension     Renal insufficiency 11/27/2018         Family History:       Problem Relation Age of Onset    Hypertension Mother     Heart Disease Mother     Diabetes Mother     High Cholesterol Mother     Cancer Father         leukemia    Hypertension Sister         Sisters are twins    Hypertension Brother     Heart Disease Brother        SURGICAL HISTORY:   Past Surgical History:   Procedure Laterality Date    BICEPS TENDON REPAIR Left            SOCIAL AND OCCUPATIONAL HEALTH:      There there is no history of TB or TB exposure. There is no asbestos or silica dust exposure. The patient reports is no coal, foundry, quarry or [employer] exposure. Travel history reveals negative  There is no history of recreational or IV drug use. There is no hot tube exposure. 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 His smoking use included cigarettes. He has a 7.50 pack-year smoking history. He has never used smokeless tobacco.  ETOH:   reports no history of alcohol use. ALLERGIES:      Allergies   Allergen Reactions    Latex Hives    Lisinopril     Nsaids Itching     Tolerates baby asa    Pcn [Penicillins] Swelling     Swelling,hives, itching and shortness of breath         Home Meds:   Prior to Admission medications    Medication Sig Start Date End Date Taking?  Authorizing Provider   sildenafil (VIAGRA) 100 MG tablet TAKE 1 TABLET BY MOUTH one time a day as NEEDED FOR ERECTILE DYSFUNCTION 3/31/22  Yes Princess Villegas MD   amLODIPine (NORVASC) 10 MG tablet TAKE 1 TABLET BY MOUTH EVERY DAY 3/10/22  Yes Princess Villegas MD   zoster recombinant adjuvanted vaccine [facility]) 50 MCG/0.5ML SUSR injection Inject 0.5 mLs into the muscle See Admin Instructions 1 dose now and repeat in 2-6 months 2/14/22  Yes Princess Villegas MD   vitamin D (ERGOCALCIFEROL) 1.25 MG (67860 UT) CAPS capsule Take 1 capsule by mouth once a week 2/14/22  Yes Princess Villegas MD   valACYclovir (VALTREX) 500 MG tablet TAKE 1 TABLET BY MOUTH ONE TIME A DAY. start within 72 hours of symptom onset 11/15/21  Yes Princess Villegas MD   aspirin 81 MG EC tablet TAKE 1 TABLET BY MOUTH ONE TIME A DAY 3/1/21  Yes HARRY Marie CNP   amphetamine-dextroamphetamine (ADDERALL XR) 20 MG extended release capsule Take 2 capsules by mouth daily.  4/8/20  Yes HARRY Romero CNP   lamoTRIgine (LAMICTAL) 25 MG tablet take 1 tab daily X 2 weeks, then 2 tabs daily in am 3/6/19  Yes HARRY Romero CNP   sertraline (ZOLOFT) 50 MG tablet Take 50 mg by mouth daily  1/10/19  Yes HARRY Romero CNP   EPINEPHrine (EPIPEN 2-LAURA) 0.3 MG/0.3ML SOAJ injection Inject 0.3 mLs into the muscle once as needed (severe allergic reaction) 6/18/19 4/14/20  Louisa Coronado MD              REVIEW OF SYSTEMS:UNCHANGED    CONSTITUTIONAL:  negative for  fevers, chills, sweats, fatigue, malaise, anorexia and weight loss morbid obesity no distress not using accessory muscles  EYES:  negative for  double vision, blurred vision, dry eyes, eye discharge and redness no jaundice no Jovany syndrome  HEENT:  negative for  hearing loss, tinnitus, ear drainage, earaches and nasal congestion  RESPIRATORY:  See hpi no dyspnea no cough or sputum production  CARDIOVASCULAR:  negative for  chest pain,, palpitations, orthopnea, PND history of hypertension no angina no PND  GASTROINTESTINAL:  negative for nausea, vomiting, change in bowel habits, diarrhea, no acid reflux constipation, abdominal pain, pruritus, abdominal mass and abdominal distention  GENITOURINARY:  negative for frequency, dysuria, nocturia, urinary incontinence and hesitancy erectile dysfunction  INTEGUMENT  negative for rash, skin lesion(s), dryness, skin color change, changes in lesion, pruritus and changes in hair negative  HEMATOLOGIC/LYMPHATIC:  negative for easy bruising, bleeding, lymphadenopathy, petechiae and swelling/edema  ALLERGIC/IMMUNOLOGIC:  negative for recurrent infections, urticaria and drug reactions  ENDOCRINE:  negative for heat intolerance, cold intolerance, tremor, weight changes and change in bowel habits no diabetes  MUSCULOSKELETAL:  negative for  myalgias, arthralgias, pain, joint swelling, stiff joints and decreased range of motion no joint pain  NEUROLOGICAL:  negative for headaches, dizziness, seizures, memory problems, speech problems, visual disturbance and coordination problems no motor or sensory deficit history of depression under good control  BEHAVIOR/PSYCH:  negative for poor appetite, increased appetite, decreased sleep, increased sleep, decreased energy level, increased energy level and poor concentration overweight no distress not using accessory muscles  Skin no rash no dermatitis  Vitals:  /81   Pulse 77   Temp 97.3 °F (36.3 °C)   Resp 17   Ht 5' 10\" (1.778 m)   Wt 242 lb (109.8 kg)   SpO2 98%   BMI 34.72 kg/m²     PHYSICAL EXAM:  General Appearance:    Alert, cooperative, no distress, appears stated age   Head:    Normocephalic, without obvious abnormality, atraumatic      Eyes:    PERRL, conjunctiva/corneas clear, EOM's intact no Jovany's no jaundice   Ears:    Normal  external ear canals, both ears normal exam   Nose:   Nares normal, septum midline, mucosa normal, no drainage        or sinus tenderness normal exam oropharyngeal oropharyngeal orifice not compromised   Throat:   Lips, mucosa, and tongue normal; teeth and gums normal   Neck:   Supple, symmetrical, trachea midline, no adenopathy;     thyroid:  no enlargement/tenderness/nodules; no carotid    bruit , JVD not elevated neck is short and fat   Back:     Symmetric, no curvature, ROM normal, no CVA tenderness   Lungs:    AP diameter is not increased percussion note is normally resonant breathing vesicular expiration not prolonged no rales rhonchi are audible no pleural friction rub is audible   Chest Wall:    No tenderness or deformity      Heart:    Regular rate and rhythm, S1 and S2 normal, no murmur, rub        or gallop no rvh                           Abdomen:                                                 Pulses:                              Skin:                  Lymph nodes:                    Neurologic:                  Soft, non-tender, bowel sounds active all four quadrants,     no masses, no organomegaly         2+ and symmetric all extremities     Skin color, texture, turgor normal, no rashes or lesions       Cervical, supraclavicular not enlarged or matted or tender      CNII-XII intact, normal strength 5/5 . Sensation grossly normal  and reflexes normal 2+  throughout     Clubbing No  Lower ext edema absent  Upper ext edema absent         Musculoskeletal no synovitis. No joint swelling or tenderness        CBC: No results for input(s): WBC, HGB, PLT in the last 72 hours. BMP:  No results for input(s): NA, K, CL, CO2, BUN, CREATININE, GLUCOSE in the last 72 hours. Hepatic: No results for input(s): AST, ALT, ALB, BILITOT, ALKPHOS in the last 72 hours. Amylase: No results found for: AMYLASE  Lipase: No results found for: LIPASE  Troponin: No results for input(s): TROPONINI in the last 72 hours. BNP: No results for input(s): BNP in the last 72 hours. Lipids: No results for input(s): CHOL, HDL in the last 72 hours. Invalid input(s): LDLCALCU  ABGs: No results found for: PHART, PO2ART, [id number]  INR: No results for input(s): INR in the last 72 hours.   Thyroid:   Lab Results Component Value Date    TSH 1.19 01/09/2020     Urinalysis: No results for input(s): BACTERIA, BLOODU, CLARITYU, COLORU, PHUR, PROTEINU, RBCUA, SPECGRAV, BILIRUBINUR, NITRU, WBCUA, LEUKOCYTESUR, GLUCOSEU in the last 72 hours. Cultures:-  No cultures    CXR  No recent x-rays      CT Scans  No recent CT scan    Echo  No echo            IMPRESSION:    Obstructive sleep apnea syndrome:              Obesity  PLAN:      Sleep apnea responding very well to the use of CPAP. Advised him to continue the use of CPAP as before. He does not feel sleepy tired fatigue during the daytime. Overall quality of life has significantly improved. Blood pressure is under good control    No angina no PND    No thyroid dysfunction no pedal edema no thromboembolic process    No nasal stuffiness no esophageal flux disease    He already had all the vaccines and and the boosters. I encouraged him to lose weight    I will see him follow-up in  6 months. Questions were answered. Requested Prescriptions      No prescriptions requested or ordered in this encounter       There are no discontinued medications. Janet Hodges received counseling on the following healthy behaviors: nutrition, exercise and medication adherence    Patient given educational materials : see patient instruction       Discussed use, benefit, and side effects of prescribed medications. Barriers to medication compliance addressed. All patient questions answered. Pt voiced understanding. I hope this updates you on my evaluation and clinical thinking. Thank you for allowing me to participate in his care. Sincerely,      Electronically signed by Miguel Michael MD on   10/13/21 at 2:01 PM EDT       Please note that this chart was generated using voice recognition Dragon dictation software. Although every effort was made to ensure the accuracy of this automated transcription, some errors in transcription may have occurred.

## 2022-05-04 DIAGNOSIS — N52.9 ERECTILE DYSFUNCTION, UNSPECIFIED ERECTILE DYSFUNCTION TYPE: ICD-10-CM

## 2022-05-04 RX ORDER — SILDENAFIL 100 MG/1
TABLET, FILM COATED ORAL
Qty: 15 TABLET | Refills: 0 | Status: SHIPPED | OUTPATIENT
Start: 2022-05-04

## 2022-05-25 ENCOUNTER — HOSPITAL ENCOUNTER (OUTPATIENT)
Age: 52
Setting detail: SPECIMEN
Discharge: HOME | End: 2022-05-25
Payer: MEDICARE

## 2022-05-25 ENCOUNTER — OFFICE VISIT (OUTPATIENT)
Dept: FAMILY MEDICINE CLINIC | Age: 52
End: 2022-05-25
Payer: MEDICARE

## 2022-05-25 ENCOUNTER — HOSPITAL ENCOUNTER (OUTPATIENT)
Age: 52
Setting detail: SPECIMEN
Discharge: HOME | End: 2022-05-25

## 2022-05-25 VITALS
DIASTOLIC BLOOD PRESSURE: 80 MMHG | OXYGEN SATURATION: 100 % | SYSTOLIC BLOOD PRESSURE: 138 MMHG | WEIGHT: 232 LBS | TEMPERATURE: 97.1 F | HEIGHT: 70 IN | HEART RATE: 67 BPM | BODY MASS INDEX: 33.21 KG/M2

## 2022-05-25 DIAGNOSIS — R35.1 BENIGN PROSTATIC HYPERPLASIA WITH NOCTURIA: ICD-10-CM

## 2022-05-25 DIAGNOSIS — N40.1 BENIGN PROSTATIC HYPERPLASIA WITH NOCTURIA: ICD-10-CM

## 2022-05-25 DIAGNOSIS — S03.40XA SPRAIN OF TEMPOROMANDIBULAR JOINT, INITIAL ENCOUNTER: ICD-10-CM

## 2022-05-25 DIAGNOSIS — Z12.5 ENCOUNTER FOR SCREENING FOR MALIGNANT NEOPLASM OF PROSTATE: ICD-10-CM

## 2022-05-25 DIAGNOSIS — R30.0 DYSURIA: ICD-10-CM

## 2022-05-25 DIAGNOSIS — N18.2 STAGE 2 CHRONIC KIDNEY DISEASE: ICD-10-CM

## 2022-05-25 DIAGNOSIS — R73.03 PREDIABETES: ICD-10-CM

## 2022-05-25 DIAGNOSIS — E55.9 VITAMIN D DEFICIENCY: ICD-10-CM

## 2022-05-25 DIAGNOSIS — I10 ESSENTIAL HYPERTENSION: Primary | ICD-10-CM

## 2022-05-25 LAB
ALBUMIN SERPL-MCNC: 4.6 G/DL (ref 3.5–5.2)
ALP BLD-CCNC: 83 U/L (ref 40–129)
ALT SERPL-CCNC: 22 U/L (ref 5–41)
ANION GAP SERPL CALCULATED.3IONS-SCNC: 10 MMOL/L (ref 9–17)
AST SERPL-CCNC: 22 U/L
BILIRUB SERPL-MCNC: 0.56 MG/DL (ref 0.3–1.2)
BILIRUBIN, POC: NEGATIVE
BLOOD URINE, POC: ABNORMAL
BUN BLDV-MCNC: 13 MG/DL (ref 6–20)
CALCIUM SERPL-MCNC: 9.6 MG/DL (ref 8.6–10.4)
CHLORIDE BLD-SCNC: 100 MMOL/L (ref 98–107)
CLARITY, POC: CLEAR
CO2: 27 MMOL/L (ref 20–31)
COLOR, POC: YELLOW
CREAT SERPL-MCNC: 1.13 MG/DL (ref 0.7–1.2)
CREATININE URINE: 96.8 MG/DL (ref 39–259)
GFR AFRICAN AMERICAN: >60 ML/MIN
GFR NON-AFRICAN AMERICAN: >60 ML/MIN
GFR SERPL CREATININE-BSD FRML MDRD: ABNORMAL ML/MIN/{1.73_M2}
GLUCOSE BLD-MCNC: 108 MG/DL (ref 70–99)
GLUCOSE URINE, POC: NEGATIVE
HBA1C MFR BLD: 5.6 %
KETONES, POC: NEGATIVE
LEUKOCYTE EST, POC: NEGATIVE
MICROALBUMIN/CREAT 24H UR: <12 MG/L
MICROALBUMIN/CREAT UR-RTO: NORMAL MCG/MG CREAT
NITRITE, POC: NEGATIVE
PH, POC: 7
POTASSIUM SERPL-SCNC: 4.2 MMOL/L (ref 3.7–5.3)
PROSTATE SPECIFIC ANTIGEN: 2.85 NG/ML
PROTEIN, POC: ABNORMAL
SODIUM BLD-SCNC: 137 MMOL/L (ref 135–144)
SPECIFIC GRAVITY, POC: 1.02
TOTAL PROTEIN: 7.6 G/DL (ref 6.4–8.3)
URIC ACID: 6.4 MG/DL (ref 3.4–7)
UROBILINOGEN, POC: 0.2

## 2022-05-25 PROCEDURE — 82570 ASSAY OF URINE CREATININE: CPT

## 2022-05-25 PROCEDURE — 1036F TOBACCO NON-USER: CPT | Performed by: FAMILY MEDICINE

## 2022-05-25 PROCEDURE — 83036 HEMOGLOBIN GLYCOSYLATED A1C: CPT | Performed by: FAMILY MEDICINE

## 2022-05-25 PROCEDURE — G0103 PSA SCREENING: HCPCS

## 2022-05-25 PROCEDURE — 82043 UR ALBUMIN QUANTITATIVE: CPT

## 2022-05-25 PROCEDURE — G8427 DOCREV CUR MEDS BY ELIG CLIN: HCPCS | Performed by: FAMILY MEDICINE

## 2022-05-25 PROCEDURE — G8417 CALC BMI ABV UP PARAM F/U: HCPCS | Performed by: FAMILY MEDICINE

## 2022-05-25 PROCEDURE — 36415 COLL VENOUS BLD VENIPUNCTURE: CPT

## 2022-05-25 PROCEDURE — 84550 ASSAY OF BLOOD/URIC ACID: CPT

## 2022-05-25 PROCEDURE — 3017F COLORECTAL CA SCREEN DOC REV: CPT | Performed by: FAMILY MEDICINE

## 2022-05-25 PROCEDURE — 80053 COMPREHEN METABOLIC PANEL: CPT

## 2022-05-25 PROCEDURE — 81002 URINALYSIS NONAUTO W/O SCOPE: CPT | Performed by: FAMILY MEDICINE

## 2022-05-25 PROCEDURE — 99214 OFFICE O/P EST MOD 30 MIN: CPT | Performed by: FAMILY MEDICINE

## 2022-05-25 RX ORDER — TAMSULOSIN HYDROCHLORIDE 0.4 MG/1
0.4 CAPSULE ORAL DAILY
Qty: 30 CAPSULE | Refills: 5 | Status: SHIPPED | OUTPATIENT
Start: 2022-05-25

## 2022-05-25 RX ORDER — AMLODIPINE BESYLATE 10 MG/1
TABLET ORAL
Qty: 90 TABLET | Refills: 5 | Status: SHIPPED | OUTPATIENT
Start: 2022-05-25

## 2022-05-25 ASSESSMENT — ENCOUNTER SYMPTOMS
BLOOD IN STOOL: 0
CHEST TIGHTNESS: 0
RHINORRHEA: 0
ABDOMINAL DISTENTION: 0
COUGH: 0
COLOR CHANGE: 0
BACK PAIN: 0
CONSTIPATION: 0
TROUBLE SWALLOWING: 0
SINUS PRESSURE: 0
WHEEZING: 0
STRIDOR: 0
SHORTNESS OF BREATH: 0
ABDOMINAL PAIN: 0
EYE REDNESS: 0
SORE THROAT: 0
DIARRHEA: 0

## 2022-05-25 NOTE — PATIENT INSTRUCTIONS
New Updates for [id number] [facility] [employer]/ [email or web address] ([facility]) ANYA    Thank you for choosing US to give you the best care! [employer] ([facility]) is always trying to think of new ways to help their patients. We are asking all patients to try out the new digital registration that is now available through your [facility] account or the new ANYA, [email or web address] ([facility]). Via the anya you're now able to update your personal and registration information prior to your upcoming appointment. This will save you time once you arrive at the office to check-in, not to mention your information remains safe!! Many other perks come from signing up for an account, such as:   Requesting refills   Scheduling an appointment   Completing an [address] Sending a message to the office/provider   Having access to your medication list   Paying your bill/copay prior to your appointment   Scheduling your yearly mammogram   Review your test results    If you are not familiar with [facility] or the [facility]) ANYA, please ask one of us and we will be happy to answer any questions or help you set-up your account. Your [facility] office,  [address] [facility]    Patient Education        Temporomandibular Disorder: Care Instructions  Overview     Temporomandibular disorders (TMDs) are problems with the muscles and joints that connect your jaw to your skull. The disorders cause pain when you talk,chew, swallow, or yawn. You may feel this pain on one or both sides. TMDs are often caused by tight jaw muscles. The tightness can be caused by clenching or grinding your teeth. This may happen when you have a lot of stressin your life. If you lower your stress, you may be able to stop clenching or grinding your teeth. This will help relax your jaw and reduce your pain. Your doctor maysuggest a dental splint. Splints can help reduce teeth grinding and clenching. You may also be able to do some things at home to feel better.  But if none of this works, your doctor may prescribe medicine to help relax your muscles andcontrol the pain. Follow-up care is a key part of your treatment and safety. Be sure to make and go to all appointments, and call your doctor if you are having problems. It's also a good idea to know your test results and keep alist of the medicines you take. How can you care for yourself at home?  Put either an ice pack or a warm, moist cloth on your jaw for 15 minutes several times a day. You can try switching back and forth between moist heat and cold.  Make eating easy on your jaw. Choose softer foods that are easy to chew like eggs, yogurt, or soup. Avoid hard foods that cause your jaws to work very hard. Try cutting your food into small pieces. And if your jaw gets too painful to chew, or if it locks, you may need to puree your food for a while.  To relax your jaw, repeat this exercise for a few minutes every morning and evening. Watch yourself in a mirror. Gently open and close your mouth. Move your jaw straight up and down. But don't do this if it makes your pain worse.  Manage stress. You may be clenching or tightening your muscles when you are under stress.  Get at least 30 minutes of exercise on most days of the week to relieve stress. Walking is a good choice.  Ask your doctor if you can take an over-the-counter pain medicine, such as acetaminophen (Tylenol), ibuprofen (Advil, Motrin), or naproxen (Aleve). Be safe with medicines. Read and follow all instructions on the label.  Use good posture for sitting and standing. Slumping your shoulders disturbs the alignment of your facial bones and muscles.  Don't:  ? Hold a phone between your shoulder and your jaw. ? Open your mouth all the way, like when you sing loudly or yawn. ? Clench or grind your teeth, bite your lips, or chew your fingernails. ? Clench things such as pens, pipes, or cigars between your teeth. When should you call for help?    Call your doctor now or seek immediate medical care if:     Your jaw is locked open or shut or it is hard to move your jaw. Watch closely for changes in your health, and be sure to contact your doctor if:     Your jaw pain gets worse.      Your face is swollen.      You do not get better as expected. Where can you learn more? Go to [email or web address] and sign in to your [employer] account. Enter L741 in the [employer] box to learn more about \"Temporomandibular Disorder: Care Instructions. \"     If you do not have an account, please click on the \"Sign Up Now\" link. Current as of: June 30, 2021               Content Version: 13.2  © 2006-2022 Healthwise, Incorporated. Care instructions adapted under license by [facility] ([facility]). If you have questions about a medical condition or this instruction, always ask your healthcare professional. [address] any warranty or liability for your use of this information.

## 2022-05-25 NOTE — PROGRESS NOTES
Visit Information    Have you changed or started any medications since your last visit including any over-the-counter medicines, vitamins, or herbal medicines? no   Are you having any side effects from any of your medications? -  no  Have you stopped taking any of your medications? Is so, why? -  no    Have you seen any other physician or provider since your last visit? No  Have you had any other diagnostic tests since your last visit? No  Have you been seen in the emergency room and/or had an admission to a hospital since we last saw you? No  Have you had your routine dental cleaning in the past 6 months? Yes     Have you activated your [employer] account? If not, what are your barriers?  Yes     Patient Care Team:  Josh De Los Santos MD as PCP - General (Family Medicine)  Josh De Los Santos MD as PCP - [facility] Provider  John Greene MD as Consulting Physician (Hematology and Oncology)  HARRY Shaver - CNP as Consulting Physician (Nurse Practitioner)    Medical History Review  Past Medical, Family, and Social History reviewed and does contribute to the patient presenting condition    Health Maintenance   Topic Date Due    Annual Wellness Visit (AWV)  Never done    Shingles vaccine (2 of 2) 04/12/2022    Colorectal Cancer Screen  10/11/2022    A1C test (Diabetic or Prediabetic)  11/15/2022    Depression Monitoring  02/14/2023    Lipids  11/18/2026    DTaP/Tdap/Td vaccine (2 - Td or Tdap) 05/29/2028    Flu vaccine  Completed    COVID-19 Vaccine  Completed    Hepatitis C screen  Completed    HIV screen  Completed    Hepatitis A vaccine  Aged Out    Hepatitis B vaccine  Aged Out    Hib vaccine  Aged Out    Meningococcal (ACWY) vaccine  Aged Out    Pneumococcal 0-64 years Vaccine  Aged Out

## 2022-05-25 NOTE — PROGRESS NOTES
Chief Complaint   Patient presents with    Hypertension    Other     prostate issues     Dysuria     sunday it started     Otalgia     right side          Senait Wise  here today for follow up on chronic medical problems, go over labs and/or diagnostic studies, and medication refills. Hypertension, Other (prostate issues ), Dysuria (sunday it started ), and Otalgia (right side )      HPI: Patient is scheduled for sick call complaining of urgency increased frequency of urination. Patient reports the symptoms started 3 to 4 weeks before. He feels very urge to go has to wake up at night feels pressure like symptoms and is uncomfortable. Patient denies any flank pain, complains of suprapubic pressure. He denies any fever chills never had the symptoms in the past.    Urine test in office is showing hematuria no UTI. Patient denies any history of kidney stones in the past.    Patient has prediabetes A1c has improved on diet control. Hypertension controlled on amlodipine denies any side effects compliant with medications. Patient also complains of pain in the right ear, reports is more on the right side of the TM joint. It started 1 to 2 weeks before, it gets worse when he opens his jaw. He feels radiating pain in the right ear denies any sinus congestion sore throat or recent acute illness. He does admit to chewing gum multiple times. He tried Tylenol with mild relief. Patient has history of CKD with mild elevation of creatinine GFR is normal.  Patient cannot take NSAIDs for pain. Vitamin D deficiency on supplements needs recheck. /80   Pulse 67   Temp 97.1 °F (36.2 °C)   Ht 5' 10\" (1.778 m)   Wt 232 lb (105.2 kg)   SpO2 100%   BMI 33.29 kg/m²    Body mass index is 33.29 kg/m². Wt Readings from Last 3 Encounters:   05/25/22 232 lb (105.2 kg)   04/13/22 242 lb (109.8 kg)   02/14/22 242 lb (109.8 kg)        [x]Negative depression screening.   PHQ Scores 2/14/2022 2/26/2021 3/18/2019 2/18/2019 9/6/2018 5/29/2018   PHQ2 Score 0 0 0 0 0 0   PHQ9 Score 0 0 0 0 0 0      []1-4 = Minimal depression   []5-9 = Milddepression   []10-14 = Moderate depression   []15-19 = Moderately severe depression   []20-27 = Severe depression    Discussed testing with the patient and all questions fully answered.     Hospital Outpatient Visit on 11/18/2021   Component Date Value Ref Range Status    WBC 11/18/2021 3.8  3.5 - 11.0 k/uL Final    RBC 11/18/2021 5.07  4.5 - 5.9 m/uL Final    Hemoglobin 11/18/2021 14.8  13.5 - 17.5 g/dL Final    Hematocrit 11/18/2021 43.2  41 - 53 % Final    MCV 11/18/2021 85.1  80 - 100 fL Final    MCH 11/18/2021 29.3  26 - 34 pg Final    MCHC 11/18/2021 34.4  31 - 37 g/dL Final    RDW 11/18/2021 13.7  11.5 - 14.9 % Final    Platelets 61/56/2749 228  150 - 450 k/uL Final    MPV 11/18/2021 7.2  6.0 - 12.0 fL Final    NRBC Automated 11/18/2021 NOT REPORTED  per 100 WBC Final    Differential Type 11/18/2021 NOT REPORTED   Final    Immature Granulocytes 11/18/2021 NOT REPORTED  0 % Final    Absolute Immature Granulocyte 11/18/2021 NOT REPORTED  0.00 - 0.30 k/uL Final    WBC Morphology 11/18/2021 NOT REPORTED   Final    RBC Morphology 11/18/2021 NOT REPORTED   Final    Platelet Estimate 72/88/9444 NOT REPORTED   Final    Seg Neutrophils 11/18/2021 48  36 - 66 % Final    Lymphocytes 11/18/2021 36  24 - 44 % Final    Monocytes 11/18/2021 8* 1 - 7 % Final    Eosinophils % 11/18/2021 6* 0 - 4 % Final    Basophils 11/18/2021 0  0 - 2 % Final    Bands 11/18/2021 2  0 - 10 % Final    Segs Absolute 11/18/2021 1.82  1.3 - 9.1 k/uL Final    Absolute Lymph # 11/18/2021 1.37  1.0 - 4.8 k/uL Final    Absolute Mono # 11/18/2021 0.30  0.1 - 1.3 k/uL Final    Absolute Eos # 11/18/2021 0.23  0.0 - 0.4 k/uL Final    Basophils Absolute 11/18/2021 0.00  0.0 - 0.2 k/uL Final    Absolute Bands # 11/18/2021 0.08  0.0 - 1.0 k/uL Final    Morphology 11/18/2021 Normal Final    Glucose 11/18/2021 108* 70 - 99 mg/dL Final    BUN 11/18/2021 16  6 - 20 mg/dL Final    CREATININE 11/18/2021 1.26* 0.70 - 1.20 mg/dL Final    Bun/Cre Ratio 11/18/2021 NOT REPORTED  9 - 20 Final    Calcium 11/18/2021 9.4  8.6 - 10.4 mg/dL Final    Sodium 11/18/2021 139  135 - 144 mmol/L Final    Potassium 11/18/2021 4.1  3.7 - 5.3 mmol/L Final    Chloride 11/18/2021 103  98 - 107 mmol/L Final    CO2 11/18/2021 25  20 - 31 mmol/L Final    Anion Gap 11/18/2021 11  9 - 17 mmol/L Final    Alkaline Phosphatase 11/18/2021 68  40 - 129 U/L Final    ALT 11/18/2021 28  5 - 41 U/L Final    AST 11/18/2021 28  <40 U/L Final    Total Bilirubin 11/18/2021 0.56  0.3 - 1.2 mg/dL Final    Total Protein 11/18/2021 7.5  6.4 - 8.3 g/dL Final    Albumin 11/18/2021 4.3  3.5 - 5.2 g/dL Final    Albumin/Globulin Ratio 11/18/2021 NOT REPORTED  1.0 - 2.5 Final    GFR Non- 11/18/2021 60* >60 mL/min Final    GFR  11/18/2021 >60  >60 mL/min Final    GFR Comment 11/18/2021        Final    Comment: Average GFR for 52-63 years old:   80 mL/min/1.73sq m  Chronic Kidney Disease:   <60 mL/min/1.73sq m  Kidney failure:   <15 mL/min/1.73sq m              eGFR calculated using average adult body mass.  Additional eGFR calculator available at:        [email or web address]            GFR Staging 11/18/2021 NOT REPORTED   Final    Cholesterol 11/18/2021 172  <200 mg/dL Final    Comment:    Cholesterol Guidelines:      <200  Desirable   200-240  Borderline      >240  Undesirable         HDL 11/18/2021 55  >40 mg/dL Final    Comment:    HDL Guidelines:    <40     Undesirable   40-59    Borderline    >59     Desirable         LDL Cholesterol 11/18/2021 103  0 - 130 mg/dL Final    Comment:    LDL Guidelines:     <100    Desirable   100-129   Near to/above Desirable   130-159   Borderline      >159   Undesirable     Direct (measured) LDL and calculated LDL are not interchangeable tests.  Chol/HDL Ratio 11/18/2021 3.1  <5 Final            Triglycerides 11/18/2021 70  <150 mg/dL Final    Comment:    Triglyceride Guidelines:     <150   Desirable   150-199  Borderline   200-499  High     >499   Very high   Based on AHA Guidelines for fasting triglyceride, October 2012.          VLDL 11/18/2021 NOT REPORTED  1 - 30 mg/dL Final    Vit D, 25-Hydroxy 11/18/2021 20.9* 30.0 - 100.0 ng/mL Final    Comment:    Reference Range:  Vitamin D status         Range   Deficiency              <20 ng/mL   Mild Deficiency       20-30 ng/mL   Sufficiency           ng/mL   Toxicity               >100 ng/mL           Most recent labs reviewed:     Lab Results   Component Value Date    WBC 3.8 11/18/2021    HGB 14.8 11/18/2021    HCT 43.2 11/18/2021    MCV 85.1 11/18/2021     11/18/2021       @BRIEFLAB(NA,K,CL,CO2,BUN,CREATININE,GLUCOSE,CALCIUM)@     Lab Results   Component Value Date    ALT 28 11/18/2021    AST 28 11/18/2021    ALKPHOS 68 11/18/2021    BILITOT 0.56 11/18/2021       Lab Results   Component Value Date    TSH 1.19 01/09/2020       Lab Results   Component Value Date    CHOL 172 11/18/2021    CHOL 174 12/03/2020    CHOL 185 09/07/2018     Lab Results   Component Value Date    TRIG 70 11/18/2021    TRIG 58 12/03/2020    TRIG 62 09/07/2018     Lab Results   Component Value Date    HDL 55 11/18/2021    HDL 53 12/03/2020    HDL 55 09/07/2018     Lab Results   Component Value Date    LDLCHOLESTEROL 103 11/18/2021    LDLCHOLESTEROL 109 12/03/2020    LDLCHOLESTEROL 118 09/07/2018     Lab Results   Component Value Date    VLDL NOT REPORTED 11/18/2021    VLDL NOT REPORTED 12/03/2020    VLDL NOT REPORTED 09/07/2018     Lab Results   Component Value Date    CHOLHDLRATIO 3.1 11/18/2021    CHOLHDLRATIO 3.3 12/03/2020    CHOLHDLRATIO 3.4 09/07/2018       Lab Results   Component Value Date    LABA1C 5.6 05/25/2022       Lab Results   Component Value Date    HKTLFVIJ78 627 02/12/2019 Lab Results   Component Value Date    FOLATE >20.0 02/12/2019       Lab Results   Component Value Date    IRON 91 02/12/2019    TIBC 260 02/12/2019       Lab Results   Component Value Date    VITD25 20.9 (L) 11/18/2021             Current Outpatient Medications   Medication Sig Dispense Refill    amLODIPine (NORVASC) 10 MG tablet TAKE 1 TABLET BY MOUTH EVERY DAY 90 tablet 5    tamsulosin (FLOMAX) 0.4 mg capsule Take 1 capsule by mouth daily 30 capsule 5    sildenafil (VIAGRA) 100 MG tablet TAKE 1 TABLET BY MOUTH one time a day as NEEDED FOR ERECTILE DYSFUNCTION 15 tablet 0    vitamin D (ERGOCALCIFEROL) 1.25 MG (26934 UT) CAPS capsule Take 1 capsule by mouth once a week 12 capsule 3    valACYclovir (VALTREX) 500 MG tablet TAKE 1 TABLET BY MOUTH ONE TIME A DAY. start within 72 hours of symptom onset 90 tablet 1    amphetamine-dextroamphetamine (ADDERALL XR) 20 MG extended release capsule Take 2 capsules by mouth daily.  lamoTRIgine (LAMICTAL) 25 MG tablet take 1 tab daily X 2 weeks, then 2 tabs daily in am      sertraline (ZOLOFT) 50 MG tablet Take 50 mg by mouth daily       zoster recombinant adjuvanted vaccine [facility]) 50 MCG/0.5ML SUSR injection Inject 0.5 mLs into the muscle See Admin Instructions 1 dose now and repeat in 2-6 months (Patient not taking: Reported on 5/25/2022) 0.5 mL 0    EPINEPHrine (EPIPEN 2-LAURA) 0.3 MG/0.3ML SOAJ injection Inject 0.3 mLs into the muscle once as needed (severe allergic reaction) 2 each 1     No current facility-administered medications for this visit.              Social History     Socioeconomic History    Marital status:      Spouse name: Not on file    Number of children: Not on file    Years of education: Not on file    Highest education level: Not on file   Occupational History    Not on file   Tobacco Use    Smoking status: Former Smoker     Packs/day: 0.50     Years: 15.00     Pack years: 7.50     Types: Cigarettes     Quit date: 1/1/2015 Years since quittin.4    Smokeless tobacco: Never Used    Tobacco comment: quit    Vaping Use    Vaping Use: Never used   Substance and Sexual Activity    Alcohol use: No     Comment: none since     Drug use: No    Sexual activity: Yes     Partners: Female   Other Topics Concern    Not on file   Social History Narrative    Not on file     Social Determinants of Health     Financial Resource Strain: Low Risk     Difficulty of Paying Living Expenses: Not hard at all   Food Insecurity: No Food Insecurity    Worried About [address] in the Last Year: Never true    [facility] in the Last Year: Never true   Transportation Needs:     Lack of Transportation (Medical): Not on file    Lack of Transportation (Non-Medical):  Not on file   Physical Activity:     Days of Exercise per Week: Not on file    Minutes of Exercise per Session: Not on file   Stress:     Feeling of Stress : Not on file   Social Connections:     Frequency of Communication with Friends and Family: Not on file    Frequency of Social Gatherings with Friends and Family: Not on file    Attends L[employer] day Services: Not on file    Active Member of Clubs or Organizations: Not on file    Attends Club or Organization Meetings: Not on file    Marital Status: Not on file   Intimate Partner Violence:     Fear of Current or Ex-Partner: Not on file    Emotionally Abused: Not on file    Physically Abused: Not on file    Sexually Abused: Not on file   Housing Stability:     Unable to Pay for Housing in the Last Year: Not on file    Number of [place] in the Last Year: Not on file    Unstable Housing in the Last Year: Not on file     Counseling given: Not Answered  Comment: quit         Family History   Problem Relation Age of Onset    Hypertension Mother     Heart Disease Mother     Diabetes Mother     High Cholesterol Mother     Cancer Father         leukemia    Hypertension Sister         Sisters are twins    Hypertension Brother     Heart Disease Brother              -rest of complaints with corresponding details per ROS    The patient's past medical, surgical, social, and family history as well as his current medications and allergies were reviewed as documented intoday's encounter. Review of Systems   Constitutional: Positive for activity change. Negative for appetite change, fatigue, fever and unexpected weight change. HENT: Positive for ear pain. Negative for congestion, postnasal drip, rhinorrhea, sinus pressure, sore throat, tinnitus and trouble swallowing. Eyes: Negative for redness and visual disturbance. Respiratory: Negative for cough, chest tightness, shortness of breath, wheezing and stridor. Cardiovascular: Negative for chest pain, palpitations and leg swelling. Gastrointestinal: Negative for abdominal distention, abdominal pain, blood in stool, constipation and diarrhea. Endocrine: Negative for polydipsia, polyphagia and polyuria. Genitourinary: Positive for difficulty urinating, dysuria, frequency and urgency. Negative for flank pain, hematuria and penile discharge. Musculoskeletal: Negative for arthralgias, back pain, gait problem, myalgias and neck pain. Skin: Negative for color change, rash and wound. Allergic/Immunologic: Negative for food allergies and immunocompromised state. Neurological: Negative for dizziness, speech difficulty, weakness, light-headedness, numbness and headaches. Psychiatric/Behavioral: Negative for agitation, behavioral problems, decreased concentration, dysphoric mood, hallucinations, sleep disturbance and suicidal ideas. The patient is nervous/anxious. Physical Exam  Vitals and nursing note reviewed. Constitutional:       General: He is not in acute distress. Appearance: Normal appearance. He is well-developed. He is obese. He is not diaphoretic. HENT:      Head: Normocephalic and atraumatic.         Comments: TM joint tenderness. Right Ear: Tympanic membrane and ear canal normal.      Left Ear: Tympanic membrane and ear canal normal.      Nose: Nose normal. No congestion or rhinorrhea. Eyes:      General:         Right eye: No discharge. Left eye: No discharge. Extraocular Movements: Extraocular movements intact. Conjunctiva/sclera: Conjunctivae normal.      Pupils: Pupils are equal, round, and reactive to light. Neck:      Thyroid: No thyromegaly. Cardiovascular:      Rate and Rhythm: Normal rate and regular rhythm. Heart sounds: Normal heart sounds. No murmur heard. Pulmonary:      Effort: Pulmonary effort is normal. No respiratory distress. Breath sounds: Normal breath sounds. No wheezing or rhonchi. Abdominal:      General: Bowel sounds are normal. There is no distension. Palpations: Abdomen is soft. There is no mass. Tenderness: There is no abdominal tenderness. There is no right CVA tenderness, left CVA tenderness, guarding or rebound. Musculoskeletal:         General: No tenderness. Cervical back: Normal range of motion and neck supple. No rigidity or spasms. Normal range of motion. Thoracic back: No spasms. Normal range of motion. Lumbar back: Normal range of motion. Lymphadenopathy:      Cervical: No cervical adenopathy. Skin:     Coloration: Skin is not jaundiced or pale. Findings: No bruising, erythema or rash. Neurological:      General: No focal deficit present. Mental Status: He is alert and oriented to person, place, and time. Cranial Nerves: No cranial nerve deficit. Sensory: No sensory deficit. Motor: No weakness or tremor. Coordination: Coordination normal.      Gait: Gait and tandem walk normal.      Deep Tendon Reflexes: Reflexes are normal and symmetric. Psychiatric:         Attention and Perception: Attention and perception normal. He is attentive. Mood and Affect: Mood is anxious.  Mood is not depressed. Affect is not tearful. Speech: He is communicative. Speech is not rapid and pressured, delayed or slurred. Behavior: Behavior normal. Behavior is not agitated or slowed. Thought Content: Thought content normal.         Judgment: Judgment normal.             ASSESSMENT AND PLAN      1. Essential hypertension  Controlled continue same medications continue amlodipine. Continue to monitor blood pressure. - amLODIPine (NORVASC) 10 MG tablet; TAKE 1 TABLET BY MOUTH EVERY DAY  Dispense: 90 tablet; Refill: 5    2. Prediabetes  Improved continue diet control continue to monitor  - POCT glycosylated hemoglobin (Hb A1C)    3. Benign prostatic hyperplasia with nocturia  Symptoms are likely due to [facility], start on Flomax check prostate will do urine for lab to rule out hematuria. - Comprehensive Metabolic Panel; Future  - PSA Screening; Future  - tamsulosin (FLOMAX) 0.4 mg capsule; Take 1 capsule by mouth daily  Dispense: 30 capsule; Refill: 5  - Urinalysis with Reflex to Culture; Future    4. Sprain of temporomandibular joint, initial encounter  Discussed in detail with patient, about the signs and symptoms discussed to avoid chewing gum. Handout also provided take Tylenol for pain. 5. Stage 2 chronic kidney disease  Stable recheck urine test, uric acid and also CMP  - Uric Acid; Future  - Microalbumin / Creatinine Urine Ratio; Future    6. Vitamin D deficiency  Continue vitamin D supplements    7. Dysuria    - POCT Urinalysis no Micro    8. Encounter for screening for malignant neoplasm of prostate     - PSA Screening;  Future      Orders Placed This Encounter   Procedures    Comprehensive Metabolic Panel     Fasting 8 hrs     Standing Status:   Future     Number of Occurrences:   1     Standing Expiration Date:   5/25/2023    Uric Acid     Standing Status:   Future     Number of Occurrences:   1     Standing Expiration Date:   5/25/2023    Microalbumin / Creatinine Urine Ratio Standing Status:   Future     Number of Occurrences:   1     Standing Expiration Date:   5/25/2023    PSA Screening     Standing Status:   Future     Number of Occurrences:   1     Standing Expiration Date:   5/25/2023    Urinalysis with Reflex to Culture     Standing Status:   Future     Standing Expiration Date:   5/25/2023     Order Specific Question:   SPECIFY(EX-CATH,MIDSTREAM,CYSTO,ETC)? Answer:   midstream    POCT glycosylated hemoglobin (Hb A1C)    POCT Urinalysis no Micro         Medications Discontinued During This Encounter   Medication Reason    aspirin 81 MG EC tablet     amLODIPine (NORVASC) 10 MG tablet REORDER       Marnimaddi Martini received counseling on the following healthy behaviors: nutrition, exercise and medication adherence  Reviewed prior labs and health maintenance. Continue current medications, diet and exercise. Discussed use, benefit, and side effects of prescribed medications. Barriers to medication compliance addressed. Patient given educational materials - see patient instructions. All patient questions answered. Patient voiced understanding. The patient'spast medical, surgical, social, and family history as well as his   current medications and allergies were reviewed as documented in today's encounter. Medications, labs, diagnostic studies, consultations andfollow-up as documented in this encounter. Return for keep anya . Patient wasseen with total face to face time of 30 minutes. More than 50% of this visit was counseling and education. Future Appointments   Date Time Provider Shruti Moore   6/20/2022 11:45 AM Brenda Vasquez MD  [id number]   10/19/2022  3:00 PM Tho Walton MD [facility] [address]     This note was completed by using the assistance of a speech-recognition program. However, inadvertent computerized transcription errors may be present.  Althoughevery effort was made to ensure accuracy, no guarantees can be provided that every mistake has been identified and corrected by editing.   Electronically signed by Raymond Haq MD on 5/25/2022  10:04 AM

## 2022-05-27 LAB
CULTURE: NO GROWTH
SPECIMEN DESCRIPTION: NORMAL

## 2022-06-20 ENCOUNTER — OFFICE VISIT (OUTPATIENT)
Dept: FAMILY MEDICINE CLINIC | Age: 52
End: 2022-06-20
Payer: MEDICARE

## 2022-06-20 VITALS
HEIGHT: 70 IN | HEART RATE: 69 BPM | OXYGEN SATURATION: 98 % | BODY MASS INDEX: 33.04 KG/M2 | WEIGHT: 230.8 LBS | DIASTOLIC BLOOD PRESSURE: 94 MMHG | TEMPERATURE: 97.8 F | SYSTOLIC BLOOD PRESSURE: 136 MMHG

## 2022-06-20 DIAGNOSIS — I10 ESSENTIAL HYPERTENSION: ICD-10-CM

## 2022-06-20 DIAGNOSIS — Z99.89 OSA ON CPAP: ICD-10-CM

## 2022-06-20 DIAGNOSIS — Z00.00 MEDICARE ANNUAL WELLNESS VISIT, SUBSEQUENT: Primary | ICD-10-CM

## 2022-06-20 DIAGNOSIS — Z71.89 ACP (ADVANCE CARE PLANNING): ICD-10-CM

## 2022-06-20 DIAGNOSIS — G47.33 OSA ON CPAP: ICD-10-CM

## 2022-06-20 DIAGNOSIS — Z71.89 ADVANCE CARE PLANNING: ICD-10-CM

## 2022-06-20 PROCEDURE — G8427 DOCREV CUR MEDS BY ELIG CLIN: HCPCS | Performed by: FAMILY MEDICINE

## 2022-06-20 PROCEDURE — 99214 OFFICE O/P EST MOD 30 MIN: CPT | Performed by: FAMILY MEDICINE

## 2022-06-20 PROCEDURE — 1036F TOBACCO NON-USER: CPT | Performed by: FAMILY MEDICINE

## 2022-06-20 PROCEDURE — G0439 PPPS, SUBSEQ VISIT: HCPCS | Performed by: FAMILY MEDICINE

## 2022-06-20 PROCEDURE — 3017F COLORECTAL CA SCREEN DOC REV: CPT | Performed by: FAMILY MEDICINE

## 2022-06-20 PROCEDURE — G8417 CALC BMI ABV UP PARAM F/U: HCPCS | Performed by: FAMILY MEDICINE

## 2022-06-20 RX ORDER — DEXTROAMPHETAMINE SACCHARATE, AMPHETAMINE ASPARTATE, DEXTROAMPHETAMINE SULFATE AND AMPHETAMINE SULFATE 3.75; 3.75; 3.75; 3.75 MG/1; MG/1; MG/1; MG/1
TABLET ORAL
COMMUNITY
Start: 2022-06-04

## 2022-06-20 RX ORDER — HYDROCHLOROTHIAZIDE 25 MG/1
25 TABLET ORAL EVERY MORNING
Qty: 90 TABLET | Refills: 1 | Status: SHIPPED | OUTPATIENT
Start: 2022-06-20

## 2022-06-20 RX ORDER — POTASSIUM CHLORIDE 750 MG/1
10 TABLET, EXTENDED RELEASE ORAL DAILY
Qty: 90 TABLET | Refills: 1 | Status: SHIPPED | OUTPATIENT
Start: 2022-06-20

## 2022-06-20 ASSESSMENT — PATIENT HEALTH QUESTIONNAIRE - PHQ9
9. THOUGHTS THAT YOU WOULD BE BETTER OFF DEAD, OR OF HURTING YOURSELF: 0
2. FEELING DOWN, DEPRESSED OR HOPELESS: 0
3. TROUBLE FALLING OR STAYING ASLEEP: 0
SUM OF ALL RESPONSES TO PHQ QUESTIONS 1-9: 0
8. MOVING OR SPEAKING SO SLOWLY THAT OTHER PEOPLE COULD HAVE NOTICED. OR THE OPPOSITE, BEING SO FIGETY OR RESTLESS THAT YOU HAVE BEEN MOVING AROUND A LOT MORE THAN USUAL: 0
6. FEELING BAD ABOUT YOURSELF - OR THAT YOU ARE A FAILURE OR HAVE LET YOURSELF OR YOUR FAMILY DOWN: 0
SUM OF ALL RESPONSES TO PHQ QUESTIONS 1-9: 0
7. TROUBLE CONCENTRATING ON THINGS, SUCH AS READING THE NEWSPAPER OR WATCHING TELEVISION: 0
SUM OF ALL RESPONSES TO PHQ QUESTIONS 1-9: 0
1. LITTLE INTEREST OR PLEASURE IN DOING THINGS: 0
SUM OF ALL RESPONSES TO PHQ QUESTIONS 1-9: 0
4. FEELING TIRED OR HAVING LITTLE ENERGY: 0
5. POOR APPETITE OR OVEREATING: 0
SUM OF ALL RESPONSES TO PHQ9 QUESTIONS 1 & 2: 0
10. IF YOU CHECKED OFF ANY PROBLEMS, HOW DIFFICULT HAVE THESE PROBLEMS MADE IT FOR YOU TO DO YOUR WORK, TAKE CARE OF THINGS AT HOME, OR GET ALONG WITH OTHER PEOPLE: 0

## 2022-06-20 ASSESSMENT — ENCOUNTER SYMPTOMS
EYE REDNESS: 0
ABDOMINAL PAIN: 0
SHORTNESS OF BREATH: 0
SINUS PRESSURE: 0
BLOOD IN STOOL: 0
COUGH: 0
CONSTIPATION: 0
COLOR CHANGE: 0
BACK PAIN: 0
RHINORRHEA: 0
ABDOMINAL DISTENTION: 0
DIARRHEA: 0

## 2022-06-20 ASSESSMENT — LIFESTYLE VARIABLES
HOW MANY STANDARD DRINKS CONTAINING ALCOHOL DO YOU HAVE ON A TYPICAL DAY: 3 OR 4
HOW OFTEN DO YOU HAVE A DRINK CONTAINING ALCOHOL: MONTHLY OR LESS

## 2022-06-20 ASSESSMENT — VISUAL ACUITY
OS_CC: 20/13
OD_CC: 20/13

## 2022-06-20 NOTE — PATIENT INSTRUCTIONS
Personalized Preventive Plan for Hardeep Butter - 6/20/2022  Medicare offers a range of preventive health benefits. Some of the tests and screenings are paid in full while other may be subject to a deductible, co-insurance, and/or copay. Some of these benefits include a comprehensive review of your medical history including lifestyle, illnesses that may run in your family, and various assessments and screenings as appropriate. After reviewing your medical record and screening and assessments performed today your provider may have ordered immunizations, labs, imaging, and/or referrals for you. A list of these orders (if applicable) as well as your Preventive Care list are included within your After Visit Summary for your review. Other Preventive Recommendations:    · A preventive eye exam performed by an eye specialist is recommended every 1-2 years to screen for glaucoma; cataracts, macular degeneration, and other eye disorders. · A preventive dental visit is recommended every 6 months. · Try to get at least 150 minutes of exercise per week or 10,000 steps per day on a pedometer . · Order or download the FREE \"Exercise & Physical Activity: Your Everyday Guide\" from [employer]. Call 7-546.765.1545 or search [employer] online. · You need 5608-8547 mg of calcium and 3677-6402 IU of vitamin D per day. It is possible to meet your calcium requirement with diet alone, but a vitamin D supplement is usually necessary to meet this goal.  · When exposed to the sun, use a sunscreen that protects against both UVA and UVB radiation with an SPF of 30 or greater. Reapply every 2 to 3 hours or after sweating, drying off with a towel, or swimming. · Always wear a seat belt when traveling in a car. Always wear a helmet when riding a bicycle or motorcycle.

## 2022-06-20 NOTE — PROGRESS NOTES
Visit Information    Have you changed or started any medications since your last visit including any over-the-counter medicines, vitamins, or herbal medicines? no   Have you stopped taking any of your medications? Is so, why? -  no  Are you having any side effects from any of your medications? - no    Have you seen any other physician or provider since your last visit?  no   Have you had any other diagnostic tests since your last visit?  no   Have you been seen in the emergency room and/or had an admission in a hospital since we last saw you? NO   Have you had your routine dental cleaning in the past 6 months?  yes -      Do you have an active MyChart account? If no, what is the barrier?   Yes    Patient Care Team:  Seymour Amaya MD as PCP - General (Family Medicine)  Seymour Amaya MD as PCP - [facility]  Riaz Parada MD as Consulting Physician (Hematology and Oncology)  HARRY Hernandez - CNP as Consulting Physician (Nurse Practitioner)    Medical History Review  Past Medical, Family, and Social History reviewed and does contribute to the patient presenting condition    Health Maintenance   Topic Date Due    Annual Wellness Visit (AWV)  Never done    Colorectal Cancer Screen  10/11/2022    Depression Monitoring  02/14/2023    A1C test (Diabetic or Prediabetic)  05/25/2023    Lipids  11/18/2026    DTaP/Tdap/Td vaccine (2 - Td or Tdap) 05/29/2028    Flu vaccine  Completed    Shingles vaccine  Completed    COVID-19 Vaccine  Completed    Hepatitis C screen  Completed    HIV screen  Completed    Hepatitis A vaccine  Aged Out    Hepatitis B vaccine  Aged Out    Hib vaccine  Aged Out    Meningococcal (ACWY) vaccine  Aged Out    Pneumococcal 0-64 years Vaccine  Aged Out

## 2022-06-20 NOTE — PROGRESS NOTES
Medicare Annual Wellness Visit    Brenda Siegel is here for Medicare AWV and [employer] (THE PAST YEAR)    Assessment & Plan   Medicare annual wellness visit, subsequent  Essential hypertension  Uncontrolled start hydrochlorothiazide along with potassium monitor blood pressure at home call back. -     hydroCHLOROthiazide (HYDRODIURIL) 25 MG tablet; Take 1 tablet by mouth every morning, Disp-90 tablet, R-1Normal  -     potassium chloride (KLOR-CON M) 10 MEQ extended release tablet; Take 1 tablet by mouth daily, Disp-90 tablet, R-1Normal  Advance care planning  -     [facility] Referral to ACP Clinical Specialist  ACP (advance care planning)  TATE on CPAP  Discussed with patient to use petroleum jelly as a barrier. Recommendations for Preventive Services Due: see orders and patient instructions/AVS.  Recommended screening schedule for the next 5-10 years is provided to the patient in written form: see Patient Instructions/AVS.     Return in 2 months (on 8/20/2022) for Medicare Annual Wellness Visit in 1 year. Subjective   Patient is scheduled for Medicare wellness visit reports he also has done in the past through nurses. No records found. Patient blood pressure is running high he is currently on amlodipine, monitors his blood pressure at home. He is on Adderall that can raise blood pressure. Patient reports he was also taking hydrochlorothiazide in the past which he stopped because his blood pressure was running normal.  His both diastolic and systolic is high. Patient also has sleep apnea and reports his nose gets dry and he bleeds after using that. He did not try any Vaseline to help. He wears nose pillow. He denies any chest pain shortness of breath dyspnea on exertion. Patient's complete Health Risk Assessment and screening values have been reviewed and are found in Flowsheets.  The following problems were reviewed today and where indicated follow up appointments were made and/or referrals stairs?: No  Do you have either shower bars, grab bars, non-slip mats or non-slip surfaces in your shower or bathtub?: (!) No  Do all of your stairways have a railing or banister?: Not Applicable  Do you always fasten your seatbelt when you are in a car?: Yes    Safety Interventions:  · Home safety tips provided           Objective   Vitals:    06/20/22 1149 06/20/22 1152   BP: (!) 138/100 (!) 136/94   Pulse: 69    Temp: 97.8 °F (36.6 °C)    SpO2: 98%    Weight: 230 lb 12.8 oz (104.7 kg)    Height: 5' 10\" (1.778 m)       Body mass index is 33.12 kg/m². Review of Systems   Constitutional: Negative for activity change, appetite change, fatigue, fever and unexpected weight change. HENT: Positive for congestion. Negative for postnasal drip, rhinorrhea and sinus pressure. Eyes: Negative for redness and visual disturbance. Respiratory: Negative for cough and shortness of breath. Cardiovascular: Negative for chest pain. Gastrointestinal: Negative for abdominal distention, abdominal pain, blood in stool, constipation and diarrhea. Endocrine: Negative for polydipsia, polyphagia and polyuria. Genitourinary: Negative for difficulty urinating, flank pain, frequency and urgency. Musculoskeletal: Negative for arthralgias, back pain, gait problem, myalgias and neck pain. Skin: Negative for color change, rash and wound. Allergic/Immunologic: Negative for food allergies and immunocompromised state. Neurological: Negative for dizziness, speech difficulty, weakness, light-headedness, numbness and headaches. Psychiatric/Behavioral: Negative for agitation, behavioral problems, decreased concentration, dysphoric mood, hallucinations, sleep disturbance and suicidal ideas. The patient is nervous/anxious. Physical Exam  Vitals and nursing note reviewed. Constitutional:       General: He is not in acute distress. Appearance: Normal appearance. He is well-developed. He is obese.  He is not diaphoretic. HENT:      Head: Normocephalic and atraumatic. Nose: Congestion present. Eyes:      General:         Right eye: No discharge. Extraocular Movements: Extraocular movements intact. Conjunctiva/sclera: Conjunctivae normal.      Pupils: Pupils are equal, round, and reactive to light. Neck:      Thyroid: No thyromegaly. Cardiovascular:      Rate and Rhythm: Normal rate and regular rhythm. Heart sounds: Normal heart sounds. No murmur heard. Pulmonary:      Effort: Pulmonary effort is normal. No respiratory distress. Breath sounds: Normal breath sounds. No wheezing or rhonchi. Abdominal:      General: Bowel sounds are normal. There is no distension. Palpations: Abdomen is soft. There is no mass. Tenderness: There is no abdominal tenderness. There is no guarding. Musculoskeletal:         General: No swelling or tenderness. Cervical back: Normal range of motion and neck supple. No rigidity or spasms. Thoracic back: No spasms. Normal range of motion. Lumbar back: No spasms. Normal range of motion. Lymphadenopathy:      Cervical: No cervical adenopathy. Skin:     Coloration: Skin is not jaundiced or pale. Findings: No bruising. Neurological:      General: No focal deficit present. Mental Status: He is alert and oriented to person, place, and time. Cranial Nerves: No cranial nerve deficit. Sensory: No sensory deficit. Motor: No weakness or tremor. Coordination: Coordination normal.      Gait: Gait and tandem walk normal.      Deep Tendon Reflexes: Reflexes are normal and symmetric. Psychiatric:         Attention and Perception: Attention and perception normal. He is attentive. Mood and Affect: Mood is anxious. Mood is not depressed. Affect is not tearful. Speech: He is communicative. Speech is not rapid and pressured, delayed or slurred.          Behavior: Behavior normal. Behavior is not agitated or slowed. Thought Content: Thought content normal.         Judgment: Judgment normal.         Allergies   Allergen Reactions    Latex Hives    Lisinopril     Nsaids Itching     Tolerates baby asa    Pcn [Penicillins] Swelling     Swelling,hives, itching and shortness of breath     Prior to Visit Medications    Medication Sig Taking? Authorizing Provider   amphetamine-dextroamphetamine (ADDERALL) 15 MG tablet TAKE 1 TABLET BY MOUTH IN THE AFTERNOON Yes Historical Provider, MD   hydroCHLOROthiazide (HYDRODIURIL) 25 MG tablet Take 1 tablet by mouth every morning Yes Shai Petersen MD   potassium chloride (KLOR-CON M) 10 MEQ extended release tablet Take 1 tablet by mouth daily Yes Shai Petersen MD   amLODIPine (NORVASC) 10 MG tablet TAKE 1 TABLET BY MOUTH EVERY DAY Yes Shai Petersen MD   tamsulosin (FLOMAX) 0.4 mg capsule Take 1 capsule by mouth daily Yes Shai Petersen MD   zoster recombinant adjuvanted vaccine [facility]) 50 MCG/0.5ML SUSR injection Inject 0.5 mLs into the muscle See Admin Instructions 1 dose now and repeat in 2-6 months Yes Shai Petersen MD   vitamin D (ERGOCALCIFEROL) 1.25 MG (90683 UT) CAPS capsule Take 1 capsule by mouth once a week Yes Shai Petersen MD   amphetamine-dextroamphetamine (ADDERALL XR) 20 MG extended release capsule Take 2 capsules by mouth daily. Yes HARRY Gilmore CNP   lamoTRIgine (LAMICTAL) 25 MG tablet take 1 tab daily X 2 weeks, then 2 tabs daily in am Yes HARRY Gilmore CNP   sertraline (ZOLOFT) 50 MG tablet Take 50 mg by mouth daily  Yes HARRY Gilmore CNP   sildenafil (VIAGRA) 100 MG tablet TAKE 1 TABLET BY MOUTH one time a day as NEEDED FOR ERECTILE DYSFUNCTION  Patient not taking: Reported on 6/20/2022  Shai Petersen MD   valACYclovir (VALTREX) 500 MG tablet TAKE 1 TABLET BY MOUTH ONE TIME A DAY.  start within 72 hours of symptom onset  Patient not taking: Reported on 6/20/2022  Shai Petersen MD   EPINEPHrine (EPIPEN 2-LAURA) 0.3 MG/0.3ML SOAJ injection Inject 0.3 mLs into the muscle once as needed (severe allergic reaction)  Kaye Hudson MD       [facility] (Including outside providers/suppliers regularly involved in providing care):   Patient Care Team:  Becki Burris MD as PCP - General (Family Medicine)  Becki Burris MD as PCP - [facility] Empaneled Provider  Joel Alvarez MD as Consulting Physician (Hematology and Oncology)  HARRY Puente CNP as Consulting Physician (Nurse Practitioner)     Reviewed and updated this visit:  Tobacco  Allergies  Meds  Problems  Med Hx  Surg Hx  Soc Hx  Fam Hx                  Obesity Counseling: Assessed behavioral health risks and factors affecting choice of behavior. Suggested weight control approaches, including dietary changes behavioral modification and follow up plan. Provided educational and support documentation. Time spent (minutes): 10    Cardiovascular Disease Risk Counseling: Assessed the patient's risk to develop cardiovascular disease and reviewed main risk factors. Reviewed steps to reduce disease risk including:   · Quitting tobacco use, reducing amount smoked, or not starting the habit  · Making healthy food choices  · Being physically active and gradualy increasing activity levels   · Reduce weight and determine a healthy BMI goal  · Monitor blood pressure and treat if higher than 140/90 mmHg  · Maintain blood total cholesterol levels under 5 mmol/l or 190 mg/dl  · Maintain LDL cholesterol levels under 3.0 mmol/l or 115 mg/dl   · Control blood glucose levels  · Consider taking aspirin (75 mg daily), once blood pressure is controlled   Provided a follow up plan.   Time spent (minutes): 10

## 2022-06-21 ENCOUNTER — CLINICAL DOCUMENTATION (OUTPATIENT)
Dept: SPIRITUAL SERVICES | Age: 52
End: 2022-06-21

## 2022-06-21 NOTE — ACP (ADVANCE CARE PLANNING)
Advance Care Planning   Ambulatory ACP Specialist Patient Outreach    Date:  6/21/2022  ACP Specialist:  Rajat Faustin    Outreach call to patient in follow-up to ACP Specialist referral from: Claudia Flores MD    [x] PCP  [] Provider   [] Ambulatory Care Management [] Other for Reason:    [x] Advance Directive Assistance  [] Code Status Discussion  [] Complete Portable DNR Order  [] Discuss Goals of Care  [] Complete POST/MOST  [] Early ACP Decision-Making  [] Other    Date Referral Received:6/20/22    Today's Outreach:  [x] First   [] Second  [] Third                               First outreach made by [x]  phone  [] email []   Front App     Intervention:  [] Spoke with Patient  [x] Left VM requesting return call      Outcome: Left detailed VM for Patient to return call. Next Step:   [] ACP scheduled conversation  [x] Outreach again in two week               [] Email / Mail ACP Info Sheets  [] Email / Mail Advance Directive            [] Close Referral. Routing closure to referring provider/staff and to ACP Specialist .      Thank you for this referral.

## 2022-07-05 ENCOUNTER — CLINICAL DOCUMENTATION (OUTPATIENT)
Dept: SPIRITUAL SERVICES | Age: 52
End: 2022-07-05

## 2022-07-05 NOTE — ACP (ADVANCE CARE PLANNING)
Advance Care Planning   Ambulatory ACP Specialist Patient Outreach    Date:  7/5/2022  ACP Specialist:  Keith Ruiz    Outreach call to patient in follow-up to ACP Specialist referral from: Cranford Riedel, MD    [x] PCP  [] Provider   [] Ambulatory Care Management [] Other for Reason:    [x] Advance Directive Assistance  [] Code Status Discussion  [] Complete Portable DNR Order  [] Discuss Goals of Care  [] Complete POST/MOST  [] Early ACP Decision-Making  [] Other    Date Referral Received:6/20/22    Today's Outreach:  [] First   [x] Second  [] Third                               Second outreach made by [x]  phone  [x] email []   for; to (do)     Intervention:  [] Spoke with Patient  [x] Left VM requesting return call      Outcome: Left detailed VM for Patient to return call. Sent Email w/ACP Packet included. Next Step:   [] ACP scheduled conversation  [x] Outreach again in two week               [x] Email / Mail ACP Info Sheets  [x] Email / Mail Advance Directive            [] Close Referral. Routing closure to referring provider/staff and to ACP Specialist .      Thank you for this referral.

## 2022-07-19 ENCOUNTER — PATIENT MESSAGE (OUTPATIENT)
Dept: SPIRITUAL SERVICES | Age: 52
End: 2022-07-19

## 2022-07-19 ENCOUNTER — CLINICAL DOCUMENTATION (OUTPATIENT)
Dept: SPIRITUAL SERVICES | Age: 52
End: 2022-07-19

## 2022-07-19 NOTE — ACP (ADVANCE CARE PLANNING)
Advance Care Planning   Ambulatory ACP Specialist Patient Outreach    Date:  7/19/2022  ACP Specialist:  Rob Mendieta    Outreach call to patient in follow-up to ACP Specialist referral from: Isabel Astorga MD    [x] PCP  [] Provider   [] Ambulatory Care Management [] Other for Reason:    [x] Advance Directive Assistance  [] Code Status Discussion  [] Complete Portable DNR Order  [] Discuss Goals of Care  [] Complete POST/MOST  [] Early ACP Decision-Making  [] Other    Date Referral Received:7/19/22    Today's Outreach:  [] First   [] Second  [x] Third                               Third outreach made by [x]  phone  [x] email [x]   [employer]     Intervention:  [] Spoke with Patient  [x] Left VM requesting return call      Outcome: Third Outreach. Left detailed VM for Patient to return call. Sent Closure Letter via Email w/ACP Packet included. [employer]. Next Step:   [] ACP scheduled conversation  [] Outreach again in one week               [x] Email / Mail ACP Info Sheets  [x] Email / Mail Advance Directive            [x] Close Referral. Routing closure to referring provider/staff and to ACP Specialist . [] Closure Letter mailed to Patient with Invitation to Contact ACP Specialist if/when ready.     Thank you for this referral.

## 2022-08-03 ENCOUNTER — E-VISIT (OUTPATIENT)
Dept: FAMILY MEDICINE CLINIC | Age: 52
End: 2022-08-03
Payer: MEDICARE

## 2022-08-03 ENCOUNTER — TELEPHONE (OUTPATIENT)
Dept: FAMILY MEDICINE CLINIC | Age: 52
End: 2022-08-03

## 2022-08-03 DIAGNOSIS — Z20.2 STD EXPOSURE: Primary | ICD-10-CM

## 2022-08-03 DIAGNOSIS — Z11.4 ENCOUNTER FOR SCREENING FOR HUMAN IMMUNODEFICIENCY VIRUS (HIV): ICD-10-CM

## 2022-08-03 PROCEDURE — 99423 OL DIG E/M SVC 21+ MIN: CPT | Performed by: FAMILY MEDICINE

## 2022-08-03 ASSESSMENT — LIFESTYLE VARIABLES
SMOKING_YEARS: 20
SMOKING_STATUS: NO, BUT I USED TO SMOKE
PACKS_PER_DAY: 0

## 2022-08-03 NOTE — TELEPHONE ENCOUNTER
Pt called in requesting orders to be put in to have  HIV and STD testing ordered.      Last OV: 6/20/2022  Next OV: 8/22/2022

## 2022-08-04 ENCOUNTER — HOSPITAL ENCOUNTER (OUTPATIENT)
Age: 52
Discharge: HOME | End: 2022-08-04
Payer: MEDICARE

## 2022-08-04 DIAGNOSIS — Z20.2 STD EXPOSURE: ICD-10-CM

## 2022-08-04 DIAGNOSIS — Z11.4 ENCOUNTER FOR SCREENING FOR HUMAN IMMUNODEFICIENCY VIRUS (HIV): ICD-10-CM

## 2022-08-04 PROCEDURE — 87491 CHLMYD TRACH DNA AMP PROBE: CPT

## 2022-08-04 PROCEDURE — 36415 COLL VENOUS BLD VENIPUNCTURE: CPT

## 2022-08-04 PROCEDURE — 82043 UR ALBUMIN QUANTITATIVE: CPT

## 2022-08-04 PROCEDURE — 87389 HIV-1 AG W/HIV-1&-2 AB AG IA: CPT

## 2022-08-04 PROCEDURE — 87591 N.GONORRHOEAE DNA AMP PROB: CPT

## 2022-08-04 PROCEDURE — 82570 ASSAY OF URINE CREATININE: CPT

## 2022-08-04 NOTE — PROGRESS NOTES
HPI: per patient's questionnaire    EXAM: not applicable    Diagnoses and all orders for this visit:    1. STD exposure    - Chlamydia/GC DNA, Urine; Future    2. Encounter for screening for human immunodeficiency virus (HIV)    - HIV Screen; Future    Orders Placed This Encounter   Procedures    Chlamydia/GC DNA, Urine     Standing Status:   Future     Standing Expiration Date:   8/4/2023    HIV Screen     Standing Status:   Future     Standing Expiration Date:   8/4/2023         Patient was advised to contact PCP if symptoms worsen or failing to change as expected        -20 -30 minutes were spent on the digital evaluation and management of this patient.   Electronically signed by Jessica Reddy MD on 8/4/22 at  7:32 AM

## 2022-08-07 LAB
CREATININE URINE: 175.9 MG/DL (ref 39–259)
MICROALBUMIN/CREAT 24H UR: <12 MG/L
MICROALBUMIN/CREAT UR-RTO: NORMAL MCG/MG CREAT
REASON FOR REJECTION: NORMAL
ZZ NTE CLEAN UP: ORDERED TEST: NORMAL
ZZ NTE WITH NAME CLEAN UP: SPECIMEN SOURCE: NORMAL

## 2022-09-19 ENCOUNTER — OFFICE VISIT (OUTPATIENT)
Dept: FAMILY MEDICINE CLINIC | Age: 52
End: 2022-09-19
Payer: MEDICARE

## 2022-09-19 VITALS
HEIGHT: 70 IN | SYSTOLIC BLOOD PRESSURE: 130 MMHG | OXYGEN SATURATION: 99 % | DIASTOLIC BLOOD PRESSURE: 82 MMHG | HEART RATE: 78 BPM | TEMPERATURE: 97.6 F | BODY MASS INDEX: 33.96 KG/M2 | WEIGHT: 237.2 LBS

## 2022-09-19 DIAGNOSIS — Z23 NEED FOR INFLUENZA VACCINATION: ICD-10-CM

## 2022-09-19 DIAGNOSIS — J34.2 DEVIATED NASAL SEPTUM: ICD-10-CM

## 2022-09-19 DIAGNOSIS — N18.2 STAGE 2 CHRONIC KIDNEY DISEASE: ICD-10-CM

## 2022-09-19 DIAGNOSIS — Z12.11 COLON CANCER SCREENING: ICD-10-CM

## 2022-09-19 DIAGNOSIS — J32.9 RECURRENT SINUSITIS: ICD-10-CM

## 2022-09-19 DIAGNOSIS — R35.1 BENIGN PROSTATIC HYPERPLASIA WITH NOCTURIA: ICD-10-CM

## 2022-09-19 DIAGNOSIS — Z99.89 OSA ON CPAP: ICD-10-CM

## 2022-09-19 DIAGNOSIS — I10 ESSENTIAL HYPERTENSION: Primary | ICD-10-CM

## 2022-09-19 DIAGNOSIS — F31.75 BIPOLAR DISORDER, IN PARTIAL REMISSION, MOST RECENT EPISODE DEPRESSED (HCC): ICD-10-CM

## 2022-09-19 DIAGNOSIS — E66.09 CLASS 1 OBESITY DUE TO EXCESS CALORIES WITH SERIOUS COMORBIDITY AND BODY MASS INDEX (BMI) OF 31.0 TO 31.9 IN ADULT: ICD-10-CM

## 2022-09-19 DIAGNOSIS — N40.1 BENIGN PROSTATIC HYPERPLASIA WITH NOCTURIA: ICD-10-CM

## 2022-09-19 DIAGNOSIS — R73.03 PREDIABETES: ICD-10-CM

## 2022-09-19 DIAGNOSIS — G47.33 OSA ON CPAP: ICD-10-CM

## 2022-09-19 PROBLEM — L21.9 SEBORRHEIC DERMATITIS OF SCALP: Status: ACTIVE | Noted: 2022-09-19

## 2022-09-19 PROBLEM — Z65.2 PROBLEMS RELATED TO RELEASE FROM PRISON: Status: RESOLVED | Noted: 2019-02-18 | Resolved: 2022-09-19

## 2022-09-19 PROCEDURE — G8427 DOCREV CUR MEDS BY ELIG CLIN: HCPCS | Performed by: FAMILY MEDICINE

## 2022-09-19 PROCEDURE — G0008 ADMIN INFLUENZA VIRUS VAC: HCPCS | Performed by: FAMILY MEDICINE

## 2022-09-19 PROCEDURE — G8417 CALC BMI ABV UP PARAM F/U: HCPCS | Performed by: FAMILY MEDICINE

## 2022-09-19 PROCEDURE — 3017F COLORECTAL CA SCREEN DOC REV: CPT | Performed by: FAMILY MEDICINE

## 2022-09-19 PROCEDURE — 99214 OFFICE O/P EST MOD 30 MIN: CPT | Performed by: FAMILY MEDICINE

## 2022-09-19 PROCEDURE — 90674 CCIIV4 VAC NO PRSV 0.5 ML IM: CPT | Performed by: FAMILY MEDICINE

## 2022-09-19 PROCEDURE — 1036F TOBACCO NON-USER: CPT | Performed by: FAMILY MEDICINE

## 2022-09-19 RX ORDER — DEXTROAMPHETAMINE SACCHARATE, AMPHETAMINE ASPARTATE, DEXTROAMPHETAMINE SULFATE AND AMPHETAMINE SULFATE 5; 5; 5; 5 MG/1; MG/1; MG/1; MG/1
TABLET ORAL
COMMUNITY
Start: 2022-08-21

## 2022-09-19 RX ORDER — LAMOTRIGINE 100 MG/1
TABLET ORAL
COMMUNITY
Start: 2022-07-03

## 2022-09-19 ASSESSMENT — PATIENT HEALTH QUESTIONNAIRE - PHQ9
SUM OF ALL RESPONSES TO PHQ QUESTIONS 1-9: 0
2. FEELING DOWN, DEPRESSED OR HOPELESS: 0
SUM OF ALL RESPONSES TO PHQ9 QUESTIONS 1 & 2: 0
4. FEELING TIRED OR HAVING LITTLE ENERGY: 0
SUM OF ALL RESPONSES TO PHQ QUESTIONS 1-9: 0
8. MOVING OR SPEAKING SO SLOWLY THAT OTHER PEOPLE COULD HAVE NOTICED. OR THE OPPOSITE, BEING SO FIGETY OR RESTLESS THAT YOU HAVE BEEN MOVING AROUND A LOT MORE THAN USUAL: 0
9. THOUGHTS THAT YOU WOULD BE BETTER OFF DEAD, OR OF HURTING YOURSELF: 0
10. IF YOU CHECKED OFF ANY PROBLEMS, HOW DIFFICULT HAVE THESE PROBLEMS MADE IT FOR YOU TO DO YOUR WORK, TAKE CARE OF THINGS AT HOME, OR GET ALONG WITH OTHER PEOPLE: 0
6. FEELING BAD ABOUT YOURSELF - OR THAT YOU ARE A FAILURE OR HAVE LET YOURSELF OR YOUR FAMILY DOWN: 0
SUM OF ALL RESPONSES TO PHQ QUESTIONS 1-9: 0
3. TROUBLE FALLING OR STAYING ASLEEP: 0
1. LITTLE INTEREST OR PLEASURE IN DOING THINGS: 0
SUM OF ALL RESPONSES TO PHQ QUESTIONS 1-9: 0
5. POOR APPETITE OR OVEREATING: 0
7. TROUBLE CONCENTRATING ON THINGS, SUCH AS READING THE NEWSPAPER OR WATCHING TELEVISION: 0

## 2022-09-19 ASSESSMENT — ENCOUNTER SYMPTOMS
COLOR CHANGE: 0
WHEEZING: 0
SHORTNESS OF BREATH: 0
EYE REDNESS: 0
CHEST TIGHTNESS: 0
RECTAL PAIN: 0
BACK PAIN: 0
SORE THROAT: 0
CONSTIPATION: 0
STRIDOR: 0
VOMITING: 0
ABDOMINAL DISTENTION: 0
SINUS PRESSURE: 1
ABDOMINAL PAIN: 0
NAUSEA: 0
TROUBLE SWALLOWING: 0
COUGH: 0
BLOOD IN STOOL: 0
RHINORRHEA: 1
DIARRHEA: 0

## 2022-09-19 NOTE — PROGRESS NOTES
Chief Complaint   Patient presents with    Hypertension    [address] [facility] ENT PROVIDER         Emile Adkins  here today for follow up on chronic medical problems, go over labs and/or diagnostic studies, and medication refills. Hypertension and Allergies (Lani [address] ENT PROVIDER)      HPI: Patient is scheduled for follow-up on chronic medical conditions including hypertension. Hypertension controlled denies any chest pain shortness of breath. Patient is currently taking hydrochlorothiazide amlodipine and is also on potassium supplements. Blood pressure runs normal monitors at home. Prediabetes stable on diet control. Patient takes low-carb low-fat diet also does physical activity on a daily routine basis. History of BHP is on Flomax, labs are stable. Patient reports symptoms are controlled on Flomax. He follows with urologist.    Patient is complaining of recurrent sinusitis, reports he feels congested and also has difficulty breathing occasionally. He has tried multiple medications including Flonase Allegra-D and plain Allegra but nothing is helping. He never had any CT of sinuses done. He has history of deviated nasal septum #ENT. Patient has history of obstructive sleep apnea and uses CPAP reports that helps with sleep. He reports compliance with CPAP. Bipolar disorder is on multiple medications follows with psychiatrist.  Symptoms are fairly stable. .    /82   Pulse 78   Temp 97.6 °F (36.4 °C)   Ht 5' 10\" (1.778 m)   Wt 237 lb 3.2 oz (107.6 kg)   SpO2 99%   BMI 34.03 kg/m²    Body mass index is 34.03 kg/m². Wt Readings from Last 3 Encounters:   09/19/22 237 lb 3.2 oz (107.6 kg)   06/20/22 230 lb 12.8 oz (104.7 kg)   05/25/22 232 lb (105.2 kg)        [x]Negative depression screening.   PHQ Scores 9/19/2022 6/20/2022 2/14/2022 2/26/2021 3/18/2019 2/18/2019 9/6/2018   PHQ2 Score 0 0 0 0 0 0 0   PHQ9 Score 0 0 0 0 0 0 0      []1-4 = Minimal depression   []5-9 = Milddepression   []10-14 = Moderate depression   []15-19 = Moderately severe depression   []20-27 = Severe depression    Discussed testing with the patient and all questions fully answered. Hospital Outpatient Visit on 08/04/2022   Component Date Value Ref Range Status    HIV Ag/Ab 08/04/2022 NONREACTIVE  NONREACTIVE Final    Comment: No laboratory evidence of HIV infection. If acute HIV infection is suspected, consider   testing for HIV-1 RNA. Specimen Description 08/04/2022 . URINE   Final    C. trachomatis DNA ,Urine 08/04/2022 NEGATIVE  NEGATIVE Final    Comment: CHLAMYDIA TRACHOMATIS DNA not detected by nucleic acid amplification. This test is intended for medical purposes only and is not valid for the evaluation of   suspected sexual abuse or for other forensic purposes. In certain contexts, culture may be required to meet applicable laws and regulations for   diagnosis of C. trachomatis and N. gonorrhoeae infections. Per 2014  CDC recommendations, this test does not include confirmation of positive results   by an alternative nucleic acid target. N. gonorrhoeae DNA, Urine 08/04/2022 NEGATIVE  NEGATIVE Final    Comment: NEISSERIA GONORRHOEAE DNA not detected by nucleic acid amplification. This test is intended for medical purposes only and is not valid for the evaluation of   suspected sexual abuse or for other forensic purposes. In certain contexts, culture may be required to meet applicable laws and regulations for   diagnosis of C. trachomatis and N. gonorrhoeae infections. Per 2014  CDC recommendations, this test does not include confirmation of positive results   by an alternative nucleic acid target. Specimen Source 08/04/2022 . URINE   Final    Ordered Test 08/04/2022 Reena Obrien   Final    Reason for Rejection 08/04/2022 Unable to perform testing: No specimen received.    Final    Microalb, Ur 08/04/2022 <12  <21 mg/L Final    Creatinine, Ur 08/04/2022 175.9  39.0 - 259.0 mg/dL Final    Microalb/Crt.  Ratio 08/04/2022 Can not be calculated  <17 mcg/mg creat Final         Most recent labs reviewed:     Lab Results   Component Value Date    WBC 3.8 11/18/2021    HGB 14.8 11/18/2021    HCT 43.2 11/18/2021    MCV 85.1 11/18/2021     11/18/2021       @BRIEFLAB(NA,K,CL,CO2,BUN,CREATININE,GLUCOSE,CALCIUM)@     Lab Results   Component Value Date    ALT 22 05/25/2022    AST 22 05/25/2022    ALKPHOS 83 05/25/2022    BILITOT 0.56 05/25/2022       Lab Results   Component Value Date    TSH 1.19 01/09/2020       Lab Results   Component Value Date    CHOL 172 11/18/2021    CHOL 174 12/03/2020    CHOL 185 09/07/2018     Lab Results   Component Value Date    TRIG 70 11/18/2021    TRIG 58 12/03/2020    TRIG 62 09/07/2018     Lab Results   Component Value Date    HDL 55 11/18/2021    HDL 53 12/03/2020    HDL 55 09/07/2018     Lab Results   Component Value Date    LDLCHOLESTEROL 103 11/18/2021    LDLCHOLESTEROL 109 12/03/2020    LDLCHOLESTEROL 118 09/07/2018     Lab Results   Component Value Date    VLDL NOT REPORTED 11/18/2021    VLDL NOT REPORTED 12/03/2020    VLDL NOT REPORTED 09/07/2018     Lab Results   Component Value Date    CHOLHDLRATIO 3.1 11/18/2021    CHOLHDLRATIO 3.3 12/03/2020    CHOLHDLRATIO 3.4 09/07/2018       Lab Results   Component Value Date    LABA1C 5.6 05/25/2022       Lab Results   Component Value Date    KXPEKGAC09 627 02/12/2019       Lab Results   Component Value Date    FOLATE >20.0 02/12/2019       Lab Results   Component Value Date    IRON 91 02/12/2019    TIBC 260 02/12/2019       Lab Results   Component Value Date    VITD25 20.9 (L) 11/18/2021             Current Outpatient Medications   Medication Sig Dispense Refill    lamoTRIgine (LAMICTAL) 100 MG tablet TAKE 1 TABLET BY MOUTH IN THE MORNING      amphetamine-dextroamphetamine (ADDERALL) 20 MG tablet       amphetamine-dextroamphetamine (ADDERALL) 15 MG tablet TAKE 1 TABLET BY MOUTH IN THE AFTERNOON      hydroCHLOROthiazide (HYDRODIURIL) 25 MG tablet Take 1 tablet by mouth every morning 90 tablet 1    potassium chloride (KLOR-CON M) 10 MEQ extended release tablet Take 1 tablet by mouth daily 90 tablet 1    amLODIPine (NORVASC) 10 MG tablet TAKE 1 TABLET BY MOUTH EVERY DAY 90 tablet 5    tamsulosin (FLOMAX) 0.4 mg capsule Take 1 capsule by mouth daily 30 capsule 5    sildenafil (VIAGRA) 100 MG tablet TAKE 1 TABLET BY MOUTH one time a day as NEEDED FOR ERECTILE DYSFUNCTION 15 tablet 0    vitamin D (ERGOCALCIFEROL) 1.25 MG (53240 UT) CAPS capsule Take 1 capsule by mouth once a week 12 capsule 3    valACYclovir (VALTREX) 500 MG tablet TAKE 1 TABLET BY MOUTH ONE TIME A DAY. start within 72 hours of symptom onset 90 tablet 1    sertraline (ZOLOFT) 50 MG tablet Take 50 mg by mouth daily       zoster recombinant adjuvanted vaccine [facility]) 50 MCG/0.5ML SUSR injection Inject 0.5 mLs into the muscle See Admin Instructions 1 dose now and repeat in 2-6 months (Patient not taking: Reported on 2022) 0.5 mL 0    EPINEPHrine (EPIPEN 2-LAURA) 0.3 MG/0.3ML SOAJ injection Inject 0.3 mLs into the muscle once as needed (severe allergic reaction) 2 each 1     No current facility-administered medications for this visit.              Social History     Socioeconomic History    Marital status:      Spouse name: Not on file    Number of children: Not on file    Years of education: Not on file    Highest education level: Not on file   Occupational History    Not on file   Tobacco Use    Smoking status: Former     Packs/day: 0.50     Years: 15.00     Pack years: 7.50     Types: Cigarettes     Quit date: 2015     Years since quittin.7    Smokeless tobacco: Never    Tobacco comments:     quit    Vaping Use    Vaping Use: Never used   Substance and Sexual Activity    Alcohol use: No     Comment: none since     Drug use: No    Sexual activity: Yes     Partners: Female   Other Topics Concern    Not on file   Social History Narrative    Not on file     Social Determinants of Health     Financial Resource Strain: Low Risk     Difficulty of Paying Living Expenses: Not hard at all   Food Insecurity: No Food Insecurity    Worried About Running Out of Food in the Last Year: Never true    Ran Out of Food in the Last Year: Never true   Transportation Needs: Not on file   Physical Activity: Sufficiently Active    Days of Exercise per Week: 3 days    Minutes of Exercise per Session: 60 min   Stress: Not on file   Social Connections: Not on file   Intimate Partner Violence: Not on file   Housing Stability: Not on file     Counseling given: Not Answered  Tobacco comments: quit 2015        Family History   Problem Relation Age of Onset    Hypertension Mother     Heart Disease Mother     Diabetes Mother     High Cholesterol Mother     Cancer Father         leukemia    Hypertension Sister         Sisters are twins    Hypertension Brother     Heart Disease Brother              -rest of complaints with corresponding details per ROS    The patient's past medical, surgical, social, and family history as well as his current medications and allergies were reviewed as documented intoday's encounter. Review of Systems   Constitutional:  Negative for activity change, appetite change, fatigue, fever and unexpected weight change. HENT:  Positive for congestion, postnasal drip, rhinorrhea and sinus pressure. Negative for ear pain, sore throat and trouble swallowing. Eyes:  Negative for redness and visual disturbance. Respiratory:  Negative for cough, chest tightness, shortness of breath, wheezing and stridor. Cardiovascular:  Negative for chest pain, palpitations and leg swelling. Gastrointestinal:  Negative for abdominal distention, abdominal pain, blood in stool, constipation, diarrhea, nausea, rectal pain and vomiting. Endocrine: Negative for polyphagia and polyuria.    Genitourinary:  Negative for difficulty urinating, flank pain, frequency and urgency. Musculoskeletal:  Negative for arthralgias, back pain, gait problem, myalgias and neck pain. Skin:  Negative for color change, rash and wound. Allergic/Immunologic: Negative for food allergies and immunocompromised state. Neurological:  Negative for dizziness, speech difficulty, weakness, light-headedness, numbness and headaches. Psychiatric/Behavioral:  Negative for agitation, behavioral problems, decreased concentration, dysphoric mood, hallucinations, sleep disturbance and suicidal ideas. The patient is nervous/anxious. Physical Exam  Vitals and nursing note reviewed. Constitutional:       General: He is not in acute distress. Appearance: Normal appearance. He is well-developed. He is obese. He is not diaphoretic. HENT:      Head: Normocephalic and atraumatic. Salivary Glands: Right salivary gland is diffusely enlarged. Right salivary gland is not tender. Left salivary gland is diffusely enlarged. Left salivary gland is not tender. Nose: Nose normal.   Eyes:      General:         Right eye: No discharge. Left eye: No discharge. Extraocular Movements: Extraocular movements intact. Conjunctiva/sclera: Conjunctivae normal.      Pupils: Pupils are equal, round, and reactive to light. Neck:      Thyroid: No thyromegaly. Cardiovascular:      Rate and Rhythm: Normal rate and regular rhythm. Heart sounds: Normal heart sounds. No murmur heard. Pulmonary:      Effort: Pulmonary effort is normal. No respiratory distress. Breath sounds: Normal breath sounds. No wheezing or rhonchi. Abdominal:      General: Bowel sounds are normal. There is no distension. Palpations: Abdomen is soft. There is no mass. Tenderness: There is no abdominal tenderness. There is no right CVA tenderness, left CVA tenderness, guarding or rebound. Musculoskeletal:         General: No tenderness.       Cervical back: Normal range of motion and neck supple. No rigidity or spasms. Thoracic back: No spasms. Normal range of motion. Lumbar back: No spasms. Normal range of motion. Lymphadenopathy:      Cervical: No cervical adenopathy. Skin:     Coloration: Skin is not jaundiced or pale. Findings: No bruising, erythema or rash. Neurological:      General: No focal deficit present. Mental Status: He is alert and oriented to person, place, and time. Cranial Nerves: No cranial nerve deficit. Sensory: No sensory deficit. Motor: No weakness or tremor. Coordination: Coordination normal.      Gait: Gait and tandem walk normal.      Deep Tendon Reflexes: Reflexes are normal and symmetric. Psychiatric:         Attention and Perception: Attention and perception normal. He is attentive. Mood and Affect: Mood is anxious. Mood is not depressed. Affect is not angry or tearful. Speech: He is communicative. Speech is not rapid and pressured, delayed or slurred. Behavior: Behavior normal. Behavior is not agitated or slowed. Thought Content: Thought content normal. Thought content is not paranoid or delusional.         Judgment: Judgment normal.           ASSESSMENT AND PLAN      1. Essential hypertension  Controlled, continue hydrochlorothiazide continue amlodipine. Monitor blood pressure continue low-salt diet    2. Prediabetes  Improving continue weight reduction continue low-carb low-fat diet    3. Benign prostatic hyperplasia with nocturia  Stable continue Flomax. Follow-up with urologist    4. Recurrent sinusitis  Uncontrolled referral placed to ENT. - Timmy Gomez MD, Otolaryngology, [facility]    5. Stage 2 chronic kidney disease  Fairly stable continue to monitor avoid NSAIDs    6. Class 1 obesity due to excess calories with serious comorbidity and body mass index (BMI) of 31.0 to 31.9 in adult  Worsening, continue to work on diet and physical activity.   Continue low-carb low-fat diet    7. Deviated nasal septum  Referral placed to ENT  - [facility] - Alex4 Timmy Fregoso MD, Otolaryngology, [place]    8. TATE on CPAP  Continue using CPAP. 9. Bipolar disorder, in partial remission, most recent episode depressed ([address]  Stable continue current treatment follow-up with psychiatrist    10. Need for influenza vaccination    - Influenza, FLUCELVAX, (age 10 mo+), IM, Preservative Free, 0.5 mL    11. Colon cancer screening    - FIT-DNA (Cologuard)      Orders Placed This Encounter   Procedures    FIT-DNA (Cologuard)    Influenza, FLUCELVAX, (age 10 mo+), IM, Preservative Free, 0.5 mL    [facility] - Jose Harper MD, Otolaryngology, [place]     Referral Priority:   Routine     Referral Type:   Eval and Treat     Referral Reason:   Specialty Services Required     Referred to Provider:   Satya Pérez MD     Requested Specialty:   Otolaryngology     Number of Visits Requested:   1         Medications Discontinued During This Encounter   Medication Reason    amphetamine-dextroamphetamine (ADDERALL XR) 20 MG extended release capsule DUPLICATE    lamoTRIgine (LAMICTAL) 25 MG tablet DOSE ADJUSTMENT       Makenzie Milton received counseling on the following healthy behaviors: nutrition, exercise, and medication adherence  Reviewed prior labs and health maintenance  Continue current medications, diet and exercise. Discussed use, benefit, and side effects of prescribed medications. Barriers to medication compliance addressed. Patient given educational materials - see patient instructions  Was a self-tracking handout given in paper form or via [employer]? Yes    Requested Prescriptions      No prescriptions requested or ordered in this encounter       All patient questions answered. Patient voiced understanding. Quality Measures    Body mass index is 34.03 kg/m². Elevated. Weight control planned discussed daily exercise regimen and Healthy diet and regular exercise.     BP: 130/82 Blood pressure is Normal. Treatment plan consists of Weight Reduction, DASH Eating Plan, Dietary Sodium Restriction, Increased Physical Activity, and No treatment change needed. Lab Results   Component Value Date    LDLCHOLESTEROL 103 11/18/2021    (goal LDL reduction with dx if diabetes is 50% LDL reduction)      PHQ Scores 9/19/2022 6/20/2022 2/14/2022 2/26/2021 3/18/2019 2/18/2019 9/6/2018   PHQ2 Score 0 0 0 0 0 0 0   PHQ9 Score 0 0 0 0 0 0 0     Interpretation of Total Score Depression Severity: 1-4 = Minimal depression, 5-9 = Mild depression, 10-14 = Moderate depression, 15-19 = Moderately severe depression, 20-27 = Severe depression    The patient'spast medical, surgical, social, and family history as well as his   current medications and allergies were reviewed as documented in today's encounter. Medications, labs, diagnostic studies, consultations andfollow-up as documented in this encounter. Return in about 4 months (around 1/19/2023) for annual exam 30min. Patient wasseen with total face to face time of 35 minutes. More than 50% of this visit was counseling and education. Future Appointments   Date Time Provider Shruti Moore   10/19/2022  3:00 PM Rina Sargent MD [facility] [place] Jaycee Nunes   1/23/2023 10:15 AM Marianela Vna MD [facility] Jaycee Nunes     This note was completed by using the assistance of a speech-recognition program. However, inadvertent computerized transcription errors may be present. Althoughevery effort was made to ensure accuracy, no guarantees can be provided that every mistake has been identified and corrected by editing.   Electronically signed by Marianela Van MD on 9/19/2022  11:45 AM

## 2022-09-19 NOTE — PROGRESS NOTES
Visit Information    Have you changed or started any medications since your last visit including any over-the-counter medicines, vitamins, or herbal medicines? no   Have you stopped taking any of your medications? Is so, why? -  no  Are you having any side effects from any of your medications? - no    Have you seen any other physician or provider since your last visit?  no   Have you had any other diagnostic tests since your last visit?  no   Have you been seen in the emergency room and/or had an admission in a hospital since we last saw you?  no   Have you had your routine dental cleaning in the past 6 months?  yes -      Do you have an active MyChart account? If no, what is the barrier?   Yes    Patient Care Team:  Leeanna Reilly MD as PCP - General (Family Medicine)  Leeanna Reilly MD as PCP - [facility]  Godwin Rod MD as Consulting Physician (Hematology and Oncology)  HARRY Mcintosh CNP as Consulting Physician (Nurse Practitioner)    Medical History Review  Past Medical, Family, and Social History reviewed and does contribute to the patient presenting condition    Health Maintenance   Topic Date Due    COVID-19 Vaccine (4 - Booster for Rehana Charnley series) 03/16/2022    Flu vaccine (1) 09/01/2022    Colorectal Cancer Screen  10/11/2022    A1C test (Diabetic or Prediabetic)  05/25/2023    Depression Monitoring  06/20/2023    Annual Wellness Visit (AWV)  06/21/2023    Lipids  11/18/2026    DTaP/Tdap/Td vaccine (2 - Td or Tdap) 05/29/2028    Shingles vaccine  Completed    Hepatitis C screen  Completed    HIV screen  Completed    Hepatitis A vaccine  Aged Out    Hepatitis B vaccine  Aged Out    Hib vaccine  Aged Out    Meningococcal (ACWY) vaccine  Aged Out    Pneumococcal 0-64 years Vaccine  Aged Out

## 2022-09-19 NOTE — PATIENT INSTRUCTIONS
New Updates for [id number] [employer]/ [employer] [address] [employer]    Thank you for choosing US to give you the best care! [address] is always trying to think of new ways to help their patients. We are asking all patients to try out the new digital registration that is now available through your [facility] account or the new ANYA, [employer] [address] [employer]. Via the anya you're now able to update your personal and registration information prior to your upcoming appointment. This will save you time once you arrive at the office to check-in, not to mention your information remains safe!! Many other perks come from signing up for an account, such as:  Requesting refills  Scheduling an appointment  Completing an E-Visit  Sending a message to the office/provider  Having access to your medication list  Paying your bill/copay prior to your appointment  Scheduling your yearly mammogram  Review your test results    If you are not familiar with [facility] or the [employer] [address] [employer] ANYA, please ask one of us and we will be happy to answer any questions or help you set-up your account.       Your [id number] office,  [place]

## 2022-09-21 ENCOUNTER — E-VISIT (OUTPATIENT)
Dept: FAMILY MEDICINE CLINIC | Age: 52
End: 2022-09-21
Payer: MEDICARE

## 2022-09-21 DIAGNOSIS — Z76.89 ENCOUNTER FOR ASSESSMENT OF STD EXPOSURE: ICD-10-CM

## 2022-09-21 DIAGNOSIS — N48.89 PENILE PAIN: Primary | ICD-10-CM

## 2022-09-21 PROCEDURE — 99423 OL DIG E/M SVC 21+ MIN: CPT | Performed by: FAMILY MEDICINE

## 2022-09-21 ASSESSMENT — PATIENT HEALTH QUESTIONNAIRE - PHQ9
10. IF YOU CHECKED OFF ANY PROBLEMS, HOW DIFFICULT HAVE THESE PROBLEMS MADE IT FOR YOU TO DO YOUR WORK, TAKE CARE OF THINGS AT HOME, OR GET ALONG WITH OTHER PEOPLE: NOT DIFFICULT AT ALL
2. FEELING DOWN, DEPRESSED OR HOPELESS: 0
7. TROUBLE CONCENTRATING ON THINGS, SUCH AS READING THE NEWSPAPER OR WATCHING TELEVISION: 0
1. LITTLE INTEREST OR PLEASURE IN DOING THINGS: 0
9. THOUGHTS THAT YOU WOULD BE BETTER OFF DEAD, OR OF HURTING YOURSELF: 0
SUM OF ALL RESPONSES TO PHQ QUESTIONS 1-9: 0
SUM OF ALL RESPONSES TO PHQ QUESTIONS 1-9: 0
7. TROUBLE CONCENTRATING ON THINGS, SUCH AS READING THE NEWSPAPER OR WATCHING TELEVISION: 0
SUM OF ALL RESPONSES TO PHQ QUESTIONS 1-9: 0
5. POOR APPETITE OR OVEREATING: 0
9. THOUGHTS THAT YOU WOULD BE BETTER OFF DEAD, OR OF HURTING YOURSELF: NOT AT ALL
10. IF YOU CHECKED OFF ANY PROBLEMS, HOW DIFFICULT HAVE THESE PROBLEMS MADE IT FOR YOU TO DO YOUR WORK, TAKE CARE OF THINGS AT HOME, OR GET ALONG WITH OTHER PEOPLE: 0
9. THOUGHTS THAT YOU WOULD BE BETTER OFF DEAD, OR OF HURTING YOURSELF: 0
3. TROUBLE FALLING OR STAYING ASLEEP: 0
6. FEELING BAD ABOUT YOURSELF - OR THAT YOU ARE A FAILURE OR HAVE LET YOURSELF OR YOUR FAMILY DOWN: 0
SUM OF ALL RESPONSES TO PHQ QUESTIONS 1-9: 0
4. FEELING TIRED OR HAVING LITTLE ENERGY: 0
SUM OF ALL RESPONSES TO PHQ QUESTIONS 1-9: 0
5. POOR APPETITE OR OVEREATING: 0
1. LITTLE INTEREST OR PLEASURE IN DOING THINGS: 0
6. FEELING BAD ABOUT YOURSELF - OR THAT YOU ARE A FAILURE OR HAVE LET YOURSELF OR YOUR FAMILY DOWN: 0
2. FEELING DOWN, DEPRESSED OR HOPELESS: 0
SUM OF ALL RESPONSES TO PHQ QUESTIONS 1-9: 0
2. FEELING DOWN, DEPRESSED OR HOPELESS: NOT AT ALL
SUM OF ALL RESPONSES TO PHQ9 QUESTIONS 1 & 2: 0
7. TROUBLE CONCENTRATING ON THINGS, SUCH AS READING THE NEWSPAPER OR WATCHING TELEVISION: NOT AT ALL
3. TROUBLE FALLING OR STAYING ASLEEP: NOT AT ALL
SUM OF ALL RESPONSES TO PHQ QUESTIONS 1-9: 0
10. IF YOU CHECKED OFF ANY PROBLEMS, HOW DIFFICULT HAVE THESE PROBLEMS MADE IT FOR YOU TO DO YOUR WORK, TAKE CARE OF THINGS AT HOME, OR GET ALONG WITH OTHER PEOPLE: 0
4. FEELING TIRED OR HAVING LITTLE ENERGY: NOT AT ALL
3. TROUBLE FALLING OR STAYING ASLEEP: 0
6. FEELING BAD ABOUT YOURSELF - OR THAT YOU ARE A FAILURE OR HAVE LET YOURSELF OR YOUR FAMILY DOWN: NOT AT ALL
1. LITTLE INTEREST OR PLEASURE IN DOING THINGS: NOT AT ALL
SUM OF ALL RESPONSES TO PHQ QUESTIONS 1-9: 0
8. MOVING OR SPEAKING SO SLOWLY THAT OTHER PEOPLE COULD HAVE NOTICED. OR THE OPPOSITE - BEING SO FIDGETY OR RESTLESS THAT YOU HAVE BEEN MOVING AROUND A LOT MORE THAN USUAL: NOT AT ALL
8. MOVING OR SPEAKING SO SLOWLY THAT OTHER PEOPLE COULD HAVE NOTICED. OR THE OPPOSITE, BEING SO FIGETY OR RESTLESS THAT YOU HAVE BEEN MOVING AROUND A LOT MORE THAN USUAL: 0
8. MOVING OR SPEAKING SO SLOWLY THAT OTHER PEOPLE COULD HAVE NOTICED. OR THE OPPOSITE, BEING SO FIGETY OR RESTLESS THAT YOU HAVE BEEN MOVING AROUND A LOT MORE THAN USUAL: 0
SUM OF ALL RESPONSES TO PHQ QUESTIONS 1-9: 0
4. FEELING TIRED OR HAVING LITTLE ENERGY: 0
SUM OF ALL RESPONSES TO PHQ9 QUESTIONS 1 & 2: 0
5. POOR APPETITE OR OVEREATING: NOT AT ALL

## 2022-09-21 ASSESSMENT — ANXIETY QUESTIONNAIRES
6. BECOMING EASILY ANNOYED OR IRRITABLE: 0-NOT AT ALL
GAD7 TOTAL SCORE: 0
7. FEELING AFRAID AS IF SOMETHING AWFUL MIGHT HAPPEN: NOT AT ALL
2. NOT BEING ABLE TO STOP OR CONTROL WORRYING: NOT AT ALL
3. WORRYING TOO MUCH ABOUT DIFFERENT THINGS: 0-NOT AT ALL
1. FEELING NERVOUS, ANXIOUS, OR ON EDGE: 0
4. TROUBLE RELAXING: 0-NOT AT ALL
5. BEING SO RESTLESS THAT IT IS HARD TO SIT STILL: 0-NOT AT ALL
2. NOT BEING ABLE TO STOP OR CONTROL WORRYING: 0-NOT AT ALL
2. NOT BEING ABLE TO STOP OR CONTROL WORRYING: 0-NOT AT ALL
3. WORRYING TOO MUCH ABOUT DIFFERENT THINGS: 0-NOT AT ALL
7. FEELING AFRAID AS IF SOMETHING AWFUL MIGHT HAPPEN: 0-NOT AT ALL
3. WORRYING TOO MUCH ABOUT DIFFERENT THINGS: NOT AT ALL
6. BECOMING EASILY ANNOYED OR IRRITABLE: 0-NOT AT ALL
1. FEELING NERVOUS, ANXIOUS, OR ON EDGE: NOT AT ALL
GAD7 TOTAL SCORE: 0
5. BEING SO RESTLESS THAT IT IS HARD TO SIT STILL: 0-NOT AT ALL
1. FEELING NERVOUS, ANXIOUS, OR ON EDGE: 0
4. TROUBLE RELAXING: NOT AT ALL
4. TROUBLE RELAXING: 0-NOT AT ALL
5. BEING SO RESTLESS THAT IT IS HARD TO SIT STILL: NOT AT ALL
GAD7 TOTAL SCORE: 0
7. FEELING AFRAID AS IF SOMETHING AWFUL MIGHT HAPPEN: 0-NOT AT ALL
6. BECOMING EASILY ANNOYED OR IRRITABLE: NOT AT ALL

## 2022-09-21 ASSESSMENT — PATIENT HEALTH QUESTIONNAIRE - GENERAL
HAVE YOU BEEN EXPERIENCING INVOLUNTARY WEIGHT GAIN OR LOSS: N
SINCE YOUR LAST VISIT, HAVE YOU EXPERIENCED EPISODES OF FAINTING OR NEAR FAINTING: N
HAVE YOU BEEN EXPERIENCING HALLUCINATIONS OR CONFUSION: N
HAVE YOU BEEN EXPERIENCING VIVID DREAMS OR NIGHTMARES THAT INTERFERE WITH YOUR SLEEP: N
HAVE YOU BEEN EXPERIENCING UNEXPLAINED HIGH FEVERS OR EXCESSIVE SWEATING: N
HAVE YOU BEEN EXPERIENCING NEW OR WORSENING VISUAL CHANGES: N
HAVE YOU BEEN EXPERIENCING NEW OR WORSENING PROBLEMS WITH YOUR SEXUAL FUNCTION: N
DO YOU HAVE ANY NEW RASHES OR UNEXPLAINED ITCHING OR SWELLING: N
DO YOU HAVE A FASTER HEART RATE THAN USUAL, DOES YOUR HEART RATE FEEL IRREGULAR OR DO YOU FEEL LIKE YOUR HEART IS POUNDING AT REST: N
HAVE YOU BEEN EXPERIENCING RACING THOUGHTS OR IMPULSIVE BEHAVIOR: N
HAVE YOU BEEN EXPERIENCING NEW OR WORSENING DIFFICULTY WITH URINATION OR CHANGE IN URINARY PATTERN: N
HAVE YOU BEEN EXPERIENCING VERY LOW OR VERY HIGH MOODS: N
HAVE YOU BEEN EXPERIENCING ABDOMINAL DISCOMFORT, NAUSEA OR CHANGES IN YOUR BOWEL PATTERN: N
HAVE YOU BEEN EXPERIENCING AN UNUSUALLY DRY MOUTH: N
HAVE YOU BEEN EXPERIENCING NEW OR WORSENING HEADACHES: N
HAVE YOU BEEN EXPERIENCING LIGHT HEADEDNESS, WOOZINESS, OR DIZZINESS, ESPECIALLY UPON STANDING FROM SITTING OR LYING POSITIONS: N
HAVE YOU BEEN EXPERIENCING NEW OR WORSENING MUSCLE TWITCHING, SHAKINESS, OR OTHER UNUSUAL CHANGES IN YOUR MUSCLE MOVEMENT: N

## 2022-09-21 NOTE — PROGRESS NOTES
HPI: per patient's questionnaire    EXAM: not applicable    Diagnoses and all orders for this visit:    1. Penile pain    - Chlamydia/GC DNA, Urine; Future  - Trichomonas Vaginali, Molecular; Future  - Herpes Profile; Future  - T. Pallidum Ab; Future    2. Encounter for assessment of STD exposure    - Chlamydia/GC DNA, Urine; Future  - Trichomonas Vaginali, Molecular; Future  - Herpes Profile; Future  - T. Pallidum Ab; Future    Orders Placed This Encounter   Procedures    Chlamydia/GC DNA, Urine     Standing Status:   Future     Standing Expiration Date:   9/21/2023    Trichomonas Vaginali, Molecular     Standing Status:   Future     Standing Expiration Date:   9/21/2023    Herpes Profile     Standing Status:   Future     Standing Expiration Date:   9/21/2023    T. Pallidum Ab     Standing Status:   Future     Standing Expiration Date:   9/21/2023         Patient was advised to contact PCP if symptoms worsen or failing to change as expected        20 -30minutes were spent on the digital evaluation and management of this patient.   Electronically signed by Jessica Reddy MD on 9/21/22 at  11:09 AM

## 2022-09-22 ENCOUNTER — HOSPITAL ENCOUNTER (OUTPATIENT)
Age: 52
Discharge: HOME | End: 2022-09-22
Payer: MEDICARE

## 2022-09-22 DIAGNOSIS — N48.89 PENILE PAIN: ICD-10-CM

## 2022-09-22 DIAGNOSIS — Z76.89 ENCOUNTER FOR ASSESSMENT OF STD EXPOSURE: ICD-10-CM

## 2022-09-22 LAB — T. PALLIDUM, IGG: NONREACTIVE

## 2022-09-22 PROCEDURE — 86694 HERPES SIMPLEX NES ANTBDY: CPT

## 2022-09-22 PROCEDURE — 87591 N.GONORRHOEAE DNA AMP PROB: CPT

## 2022-09-22 PROCEDURE — 86696 HERPES SIMPLEX TYPE 2 TEST: CPT

## 2022-09-22 PROCEDURE — 87661 TRICHOMONAS VAGINALIS AMPLIF: CPT

## 2022-09-22 PROCEDURE — 86695 HERPES SIMPLEX TYPE 1 TEST: CPT

## 2022-09-22 PROCEDURE — 86780 TREPONEMA PALLIDUM: CPT

## 2022-09-22 PROCEDURE — 87491 CHLMYD TRACH DNA AMP PROBE: CPT

## 2022-09-22 PROCEDURE — 36415 COLL VENOUS BLD VENIPUNCTURE: CPT

## 2022-09-23 LAB
C. TRACHOMATIS DNA ,URINE: NEGATIVE
N. GONORRHOEAE DNA, URINE: NEGATIVE
SOURCE: NORMAL
SPECIMEN DESCRIPTION: NORMAL
TRICHOMONAS VAGINALI, MOLECULAR: NEGATIVE

## 2022-09-27 LAB
HERPES SIMPLEX VIRUS 1 IGG: 3.42
HERPES SIMPLEX VIRUS 2 IGG: 1.39
HERPES TYPE 1/2 IGM COMBINED: 0.36

## 2022-11-11 DIAGNOSIS — N52.9 ERECTILE DYSFUNCTION, UNSPECIFIED ERECTILE DYSFUNCTION TYPE: ICD-10-CM

## 2022-11-14 RX ORDER — SILDENAFIL 100 MG/1
TABLET, FILM COATED ORAL
Qty: 15 TABLET | Refills: 0 | Status: SHIPPED | OUTPATIENT
Start: 2022-11-14

## 2022-11-14 NOTE — TELEPHONE ENCOUNTER
Please Approve or Refuse.   Send to Pharmacy per Pt's Request:      Next Visit Date:  1/23/2023   Last Visit Date: 9/21/2022    Hemoglobin A1C (%)   Date Value   05/25/2022 5.6   11/15/2021 5.6   12/03/2020 5.7             ( goal A1C is < 7)   BP Readings from Last 3 Encounters:   09/19/22 130/82   06/20/22 (!) 136/94   05/25/22 138/80          (goal 120/80)  BUN   Date Value Ref Range Status   05/25/2022 13 6 - 20 mg/dL Final     Creatinine   Date Value Ref Range Status   05/25/2022 1.13 0.70 - 1.20 mg/dL Final     Potassium   Date Value Ref Range Status   05/25/2022 4.2 3.7 - 5.3 mmol/L Final

## 2022-12-17 DIAGNOSIS — R35.1 BENIGN PROSTATIC HYPERPLASIA WITH NOCTURIA: ICD-10-CM

## 2022-12-17 DIAGNOSIS — N40.1 BENIGN PROSTATIC HYPERPLASIA WITH NOCTURIA: ICD-10-CM

## 2022-12-18 DIAGNOSIS — N52.9 ERECTILE DYSFUNCTION, UNSPECIFIED ERECTILE DYSFUNCTION TYPE: ICD-10-CM

## 2022-12-19 RX ORDER — TAMSULOSIN HYDROCHLORIDE 0.4 MG/1
CAPSULE ORAL
Qty: 30 CAPSULE | Refills: 0 | Status: SHIPPED | OUTPATIENT
Start: 2022-12-19

## 2022-12-19 RX ORDER — SILDENAFIL 100 MG/1
TABLET, FILM COATED ORAL
Qty: 15 TABLET | Refills: 0 | Status: SHIPPED | OUTPATIENT
Start: 2022-12-19

## 2022-12-19 NOTE — TELEPHONE ENCOUNTER
Please Approve or Refuse.   Send to Pharmacy per Pt's Request: Emeka Jorge     Next Visit Date:  12/18/2022   Last Visit Date: 9/21/2022    Hemoglobin A1C (%)   Date Value   05/25/2022 5.6   11/15/2021 5.6   12/03/2020 5.7             ( goal A1C is < 7)   BP Readings from Last 3 Encounters:   09/19/22 130/82   06/20/22 (!) 136/94   05/25/22 138/80          (goal 120/80)  BUN   Date Value Ref Range Status   05/25/2022 13 6 - 20 mg/dL Final     Creatinine   Date Value Ref Range Status   05/25/2022 1.13 0.70 - 1.20 mg/dL Final     Potassium   Date Value Ref Range Status   05/25/2022 4.2 3.7 - 5.3 mmol/L Final

## 2023-01-10 DIAGNOSIS — N52.9 ERECTILE DYSFUNCTION, UNSPECIFIED ERECTILE DYSFUNCTION TYPE: ICD-10-CM

## 2023-01-11 RX ORDER — SILDENAFIL 100 MG/1
TABLET, FILM COATED ORAL
Qty: 60 TABLET | Refills: 1 | Status: SHIPPED | OUTPATIENT
Start: 2023-01-11

## 2023-01-13 DIAGNOSIS — E55.9 VITAMIN D DEFICIENCY: ICD-10-CM

## 2023-01-13 RX ORDER — ERGOCALCIFEROL 1.25 MG/1
CAPSULE ORAL
Qty: 12 CAPSULE | Refills: 0 | Status: SHIPPED | OUTPATIENT
Start: 2023-01-13

## 2023-01-13 NOTE — TELEPHONE ENCOUNTER
Please Approve or Refuse.   Send to Pharmacy per Pt's Request: [facility])     Next Visit Date:  1/23/2023   Last Visit Date: 9/21/2022    Hemoglobin A1C (%)   Date Value   05/25/2022 5.6   11/15/2021 5.6   12/03/2020 5.7             ( goal A1C is < 7)   BP Readings from Last 3 Encounters:   09/19/22 130/82   06/20/22 (!) 136/94   05/25/22 138/80          (goal 120/80)  BUN   Date Value Ref Range Status   05/25/2022 13 6 - 20 mg/dL Final     Creatinine   Date Value Ref Range Status   05/25/2022 1.13 0.70 - 1.20 mg/dL Final     Potassium   Date Value Ref Range Status   05/25/2022 4.2 3.7 - 5.3 mmol/L Final

## 2023-01-19 DIAGNOSIS — N40.1 BENIGN PROSTATIC HYPERPLASIA WITH NOCTURIA: ICD-10-CM

## 2023-01-19 DIAGNOSIS — R35.1 BENIGN PROSTATIC HYPERPLASIA WITH NOCTURIA: ICD-10-CM

## 2023-01-19 NOTE — TELEPHONE ENCOUNTER
Please Approve or Refuse.   Send to Pharmacy per Pt's Request: Toya Kwok      Next Visit Date:  1/23/2023   Last Visit Date: 9/21/2022    Hemoglobin A1C (%)   Date Value   05/25/2022 5.6   11/15/2021 5.6   12/03/2020 5.7             ( goal A1C is < 7)   BP Readings from Last 3 Encounters:   09/19/22 130/82   06/20/22 (!) 136/94   05/25/22 138/80          (goal 120/80)  BUN   Date Value Ref Range Status   05/25/2022 13 6 - 20 mg/dL Final     Creatinine   Date Value Ref Range Status   05/25/2022 1.13 0.70 - 1.20 mg/dL Final     Potassium   Date Value Ref Range Status   05/25/2022 4.2 3.7 - 5.3 mmol/L Final
Flash pulmonary edema

## 2023-01-20 RX ORDER — TAMSULOSIN HYDROCHLORIDE 0.4 MG/1
CAPSULE ORAL
Qty: 90 CAPSULE | Refills: 1 | Status: SHIPPED | OUTPATIENT
Start: 2023-01-20

## 2023-01-23 ENCOUNTER — OFFICE VISIT (OUTPATIENT)
Dept: FAMILY MEDICINE CLINIC | Age: 53
End: 2023-01-23
Payer: MEDICARE

## 2023-01-23 VITALS
SYSTOLIC BLOOD PRESSURE: 120 MMHG | TEMPERATURE: 97.7 F | BODY MASS INDEX: 33.93 KG/M2 | OXYGEN SATURATION: 98 % | HEIGHT: 70 IN | HEART RATE: 97 BPM | WEIGHT: 237 LBS | DIASTOLIC BLOOD PRESSURE: 70 MMHG

## 2023-01-23 DIAGNOSIS — N40.1 BENIGN PROSTATIC HYPERPLASIA WITH NOCTURIA: ICD-10-CM

## 2023-01-23 DIAGNOSIS — R73.03 PREDIABETES: ICD-10-CM

## 2023-01-23 DIAGNOSIS — Z71.89 ACP (ADVANCE CARE PLANNING): ICD-10-CM

## 2023-01-23 DIAGNOSIS — F31.75 BIPOLAR DISORDER, IN PARTIAL REMISSION, MOST RECENT EPISODE DEPRESSED (HCC): ICD-10-CM

## 2023-01-23 DIAGNOSIS — E55.9 VITAMIN D DEFICIENCY: ICD-10-CM

## 2023-01-23 DIAGNOSIS — E66.09 CLASS 1 OBESITY DUE TO EXCESS CALORIES WITH SERIOUS COMORBIDITY AND BODY MASS INDEX (BMI) OF 31.0 TO 31.9 IN ADULT: ICD-10-CM

## 2023-01-23 DIAGNOSIS — Z12.5 ENCOUNTER FOR PROSTATE CANCER SCREENING: ICD-10-CM

## 2023-01-23 DIAGNOSIS — Z00.00 ENCOUNTER FOR WELL ADULT EXAM WITHOUT ABNORMAL FINDINGS: Primary | ICD-10-CM

## 2023-01-23 DIAGNOSIS — I10 ESSENTIAL HYPERTENSION: ICD-10-CM

## 2023-01-23 DIAGNOSIS — R35.1 BENIGN PROSTATIC HYPERPLASIA WITH NOCTURIA: ICD-10-CM

## 2023-01-23 DIAGNOSIS — Z13.6 SCREENING FOR CARDIOVASCULAR CONDITION: ICD-10-CM

## 2023-01-23 DIAGNOSIS — R76.8 ANA POSITIVE: ICD-10-CM

## 2023-01-23 LAB — HBA1C MFR BLD: 5.9 %

## 2023-01-23 PROCEDURE — 3078F DIAST BP <80 MM HG: CPT | Performed by: FAMILY MEDICINE

## 2023-01-23 PROCEDURE — G8482 FLU IMMUNIZE ORDER/ADMIN: HCPCS | Performed by: FAMILY MEDICINE

## 2023-01-23 PROCEDURE — 3074F SYST BP LT 130 MM HG: CPT | Performed by: FAMILY MEDICINE

## 2023-01-23 PROCEDURE — G0447 BEHAVIOR COUNSEL OBESITY 15M: HCPCS | Performed by: FAMILY MEDICINE

## 2023-01-23 PROCEDURE — G0446 INTENS BEHAVE THER CARDIO DX: HCPCS | Performed by: FAMILY MEDICINE

## 2023-01-23 PROCEDURE — 83036 HEMOGLOBIN GLYCOSYLATED A1C: CPT | Performed by: FAMILY MEDICINE

## 2023-01-23 PROCEDURE — 99396 PREV VISIT EST AGE 40-64: CPT | Performed by: FAMILY MEDICINE

## 2023-01-23 RX ORDER — DEXTROAMPHETAMINE SACCHARATE, AMPHETAMINE ASPARTATE MONOHYDRATE, DEXTROAMPHETAMINE SULFATE AND AMPHETAMINE SULFATE 5; 5; 5; 5 MG/1; MG/1; MG/1; MG/1
CAPSULE, EXTENDED RELEASE ORAL
COMMUNITY
Start: 2023-01-10 | End: 2023-01-23 | Stop reason: SDUPTHER

## 2023-01-23 RX ORDER — AMLODIPINE BESYLATE 10 MG/1
TABLET ORAL
Qty: 90 TABLET | Refills: 5 | Status: SHIPPED | OUTPATIENT
Start: 2023-01-23

## 2023-01-23 SDOH — ECONOMIC STABILITY: HOUSING INSECURITY
IN THE LAST 12 MONTHS, WAS THERE A TIME WHEN YOU DID NOT HAVE A STEADY PLACE TO SLEEP OR SLEPT IN A SHELTER (INCLUDING NOW)?: NO

## 2023-01-23 SDOH — ECONOMIC STABILITY: FOOD INSECURITY: WITHIN THE PAST 12 MONTHS, YOU WORRIED THAT YOUR FOOD WOULD RUN OUT BEFORE YOU GOT MONEY TO BUY MORE.: NEVER TRUE

## 2023-01-23 SDOH — ECONOMIC STABILITY: TRANSPORTATION INSECURITY
IN THE PAST 12 MONTHS, HAS THE LACK OF TRANSPORTATION KEPT YOU FROM MEDICAL APPOINTMENTS OR FROM GETTING MEDICATIONS?: NO

## 2023-01-23 SDOH — ECONOMIC STABILITY: TRANSPORTATION INSECURITY
IN THE PAST 12 MONTHS, HAS LACK OF TRANSPORTATION KEPT YOU FROM MEETINGS, WORK, OR FROM GETTING THINGS NEEDED FOR DAILY LIVING?: NO

## 2023-01-23 SDOH — ECONOMIC STABILITY: INCOME INSECURITY: IN THE LAST 12 MONTHS, WAS THERE A TIME WHEN YOU WERE NOT ABLE TO PAY THE MORTGAGE OR RENT ON TIME?: NO

## 2023-01-23 SDOH — ECONOMIC STABILITY: FOOD INSECURITY: WITHIN THE PAST 12 MONTHS, THE FOOD YOU BOUGHT JUST DIDN'T LAST AND YOU DIDN'T HAVE MONEY TO GET MORE.: NEVER TRUE

## 2023-01-23 ASSESSMENT — ENCOUNTER SYMPTOMS
COLOR CHANGE: 0
ABDOMINAL PAIN: 0
BACK PAIN: 0
WHEEZING: 0
SHORTNESS OF BREATH: 0
BLOOD IN STOOL: 0
ABDOMINAL DISTENTION: 0
RHINORRHEA: 0
CONSTIPATION: 0
SINUS PRESSURE: 0
STRIDOR: 0
VOMITING: 0
CHEST TIGHTNESS: 0
NAUSEA: 0
RECTAL PAIN: 0
TROUBLE SWALLOWING: 0
SORE THROAT: 0
DIARRHEA: 0
EYE REDNESS: 0
COUGH: 0

## 2023-01-23 ASSESSMENT — PATIENT HEALTH QUESTIONNAIRE - PHQ9
9. THOUGHTS THAT YOU WOULD BE BETTER OFF DEAD, OR OF HURTING YOURSELF: 0
3. TROUBLE FALLING OR STAYING ASLEEP: 0
SUM OF ALL RESPONSES TO PHQ QUESTIONS 1-9: 0
SUM OF ALL RESPONSES TO PHQ9 QUESTIONS 1 & 2: 0
2. FEELING DOWN, DEPRESSED OR HOPELESS: 0
4. FEELING TIRED OR HAVING LITTLE ENERGY: 0
SUM OF ALL RESPONSES TO PHQ QUESTIONS 1-9: 0
6. FEELING BAD ABOUT YOURSELF - OR THAT YOU ARE A FAILURE OR HAVE LET YOURSELF OR YOUR FAMILY DOWN: 0
5. POOR APPETITE OR OVEREATING: 0
1. LITTLE INTEREST OR PLEASURE IN DOING THINGS: 0
10. IF YOU CHECKED OFF ANY PROBLEMS, HOW DIFFICULT HAVE THESE PROBLEMS MADE IT FOR YOU TO DO YOUR WORK, TAKE CARE OF THINGS AT HOME, OR GET ALONG WITH OTHER PEOPLE: 0
8. MOVING OR SPEAKING SO SLOWLY THAT OTHER PEOPLE COULD HAVE NOTICED. OR THE OPPOSITE, BEING SO FIGETY OR RESTLESS THAT YOU HAVE BEEN MOVING AROUND A LOT MORE THAN USUAL: 0
SUM OF ALL RESPONSES TO PHQ QUESTIONS 1-9: 0
SUM OF ALL RESPONSES TO PHQ QUESTIONS 1-9: 0
7. TROUBLE CONCENTRATING ON THINGS, SUCH AS READING THE NEWSPAPER OR WATCHING TELEVISION: 0

## 2023-01-23 ASSESSMENT — SOCIAL DETERMINANTS OF HEALTH (SDOH): HOW HARD IS IT FOR YOU TO PAY FOR THE VERY BASICS LIKE FOOD, HOUSING, MEDICAL CARE, AND HEATING?: NOT HARD AT ALL

## 2023-01-23 NOTE — PROGRESS NOTES
Visit Information    Have you changed or started any medications since your last visit including any over-the-counter medicines, vitamins, or herbal medicines? no   Are you having any side effects from any of your medications? -  no  Have you stopped taking any of your medications? Is so, why? -  no    Have you seen any other physician or provider since your last visit? No  Have you had any other diagnostic tests since your last visit? No  Have you been seen in the emergency room and/or had an admission to a hospital since we last saw you? No  Have you had your routine dental cleaning in the past 6 months? yes     Have you activated your [employer] account? If not, what are your barriers?  Yes     Patient Care Team:  Adalberto Rod MD as PCP - General (Family Medicine)  Adalberto Rod MD as PCP - [facility] Provider  Lucio Lennon MD as Consulting Physician (Hematology and Oncology)  HARRY Solitario CNP as Consulting Physician (Nurse Practitioner)    Medical History Review  Past Medical, Family, and Social History reviewed and does contribute to the patient presenting condition    Health Maintenance   Topic Date Due    COVID-19 Vaccine (4 - Booster for Roberto Shabazz series) 01/11/2022    Colorectal Cancer Screen  10/11/2022    A1C test (Diabetic or Prediabetic)  05/25/2023    Annual Wellness Visit (AWV)  06/21/2023    Depression Monitoring  09/21/2023    Lipids  11/18/2026    DTaP/Tdap/Td vaccine (2 - Td or Tdap) 05/29/2028    Flu vaccine  Completed    Shingles vaccine  Completed    Hepatitis C screen  Completed    HIV screen  Completed    Hepatitis A vaccine  Aged Out    Hib vaccine  Aged Out    Meningococcal (ACWY) vaccine  Aged Out    Pneumococcal 0-64 years Vaccine  Aged Out

## 2023-01-23 NOTE — PROGRESS NOTES
2023      Inés Mandel (:  1970) is a 45 y.o. male, here for a preventive medicine evaluation, Annual Exam   .      ASSESSMENT/PLAN:    1. Encounter for well adult exam without abnormal findings  2. Prediabetes  Mildly increased A1c 5.9, counseled about exercise patient will restart his lifestyle medicine.  -     POCT glycosylated hemoglobin (Hb A1C)  -     Uric Acid; Future  3. Essential hypertension  Controlled continue current regiment of treatment  -     amLODIPine (NORVASC) 10 MG tablet; TAKE 1 TABLET BY MOUTH EVERY DAY, Disp-90 tablet, R-5Normal  -     CBC with Auto Differential; Future  -     Comprehensive Metabolic Panel; Future  -     Lipid Panel; Future  -     Urinalysis with Reflex to Culture; Future  4. ACP (advance care planning)  Discussed and handout also provided  5. Bipolar disorder, in partial remission, most recent episode depressed ([facility] [address]  Stable follows with psychiatrist  6. Benign prostatic hyperplasia with nocturia  -     Urinalysis with Reflex to Culture; Future  7. KRISTI positive  -     Uric Acid; Future  -     KRISTI Screen With Reflex; Future  8. Vitamin D deficiency  -     Vitamin D 25 Hydroxy; Future  9. Encounter for prostate cancer screening  10. Class 1 obesity due to excess calories with serious comorbidity and body mass index (BMI) of 31.0 to 31.9 in adult  -     NC Behavior  obesity 15m []  11. Screening for cardiovascular condition  -     NC Intens behave ther cardio dx, 15 minutes []      Low carb, low fat diet, increase fruits and vegetables, and exercise 4-5 times a week 30-40 minutes a day, or walk 1-2 hours per day, or wear a pedometer and get at least 10,000 steps per day. Dental exam 2-3 times /year advised. Immunizations reviewed. Health Maintenance reviewed   Smoking cessation counseling given, patient does not smoke    Annual eye exam advised.       Makenzie [place] received counseling on the following healthy behaviors: nutrition, exercise, and medication adherence  Reviewed prior labs and health maintenance  Discussed use, benefit, and side effects of prescribed medications.   Barriers to medication compliance addressed.   Patient given educational materials - see patient instructions  All patient questions answered.  Patient voiced understanding.    The patient's past medical, surgical, social, and family history as well as his  current medications and allergies were reviewed as documented in today's encounter.      Medications, labs, diagnostic studies, consultations and follow-up as documented in thisencounter.    Return in about 4 months (around 5/23/2023) for 30min,always chronic conditons.    Data Unavailable    Future Appointments   Date Time Provider Department Center   5/23/2023  9:30 AM [facility]         Patient Active Problem List   Diagnosis    Family history of diabetes mellitus    Essential hypertension    Bipolar 1 disorder (HCC)    Former smoker    Renal insufficiency    ED (erectile dysfunction)    Depression    Class 1 obesity with serious comorbidity and body mass index (BMI) of 31.0 to 31.9 in adult    KRISTI positive    Herpes genitalis in men    Urticaria    Attention-deficit hyperactivity disorder, unspecified type    Bipolar disorder, in partial remission, most recent episode depressed (HCC)    Prediabetes    Fatigue    TATE on CPAP    Vitamin D deficiency    Benign prostatic hyperplasia with nocturia    Sprain of temporomandibular joint or ligament    Stage 2 chronic kidney disease    Deviated nasal septum       Prior to Visit Medications    Medication Sig Taking? Authorizing Provider   amLODIPine (NORVASC) 10 MG tablet TAKE 1 TABLET BY MOUTH EVERY DAY Yes Dain Norwood MD   tamsulosin (FLOMAX) 0.4 MG capsule TAKE 1 CAPSULE BY MOUTH EVERY DAY Yes Dain Norwood MD   vitamin D (ERGOCALCIFEROL) 1.25 MG (01208 UT) CAPS capsule TAKE 1 CAPSULE BY MOUTH ONE TIME A WEEK Yes Dain Norwood MD   sildenafil  (VIAGRA) 100 MG tablet TAKE 1 TABLET BY MOUTH one time a day as NEEDED FOR ERECTILE DYSFUNCTION Yes Roberto Park MD   lamoTRIgine (LAMICTAL) 100 MG tablet TAKE 1 TABLET BY MOUTH IN THE MORNING Yes Historical Provider, MD   amphetamine-dextroamphetamine (ADDERALL) 20 MG tablet  Yes Historical Provider, MD   hydroCHLOROthiazide (HYDRODIURIL) 25 MG tablet Take 1 tablet by mouth every morning Yes Roberto Park MD   potassium chloride (KLOR-CON M) 10 MEQ extended release tablet Take 1 tablet by mouth daily Yes Roberto Park MD   valACYclovir (VALTREX) 500 MG tablet TAKE 1 TABLET BY MOUTH ONE TIME A DAY. start within 72 hours of symptom onset Yes Roberto Park MD   sertraline (ZOLOFT) 50 MG tablet Take 50 mg by mouth daily  Yes HARRY Elena - CNP   EPINEPHrine (EPIPEN 2-LAURA) 0.3 MG/0.3ML SOAJ injection Inject 0.3 mLs into the muscle once as needed (severe allergic reaction)  Harlow Bamberger, MD        Allergies   Allergen Reactions    Latex Hives    Lisinopril     Nsaids Itching     Tolerates baby asa    Pcn [Penicillins] Swelling     Swelling,hives, itching and shortness of breath       Past Medical History:   Diagnosis Date    Bipolar 1 disorder ([facility] [address]     Depression     prev on zoloft , abilify     Hypertension     Renal insufficiency 2018       Past Surgical History:   Procedure Laterality Date    BICEPS TENDON REPAIR Left        .   Social History     Tobacco Use    Smoking status: Former     Packs/day: 0.50     Years: 15.00     Pack years: 7.50     Types: Cigarettes     Quit date: 2015     Years since quittin.0    Smokeless tobacco: Never    Tobacco comments:     quit    Vaping Use    Vaping Use: Never used   Substance Use Topics    Alcohol use: No     Comment: none since     Drug use: No       Family History   Problem Relation Age of Onset    Hypertension Mother     Heart Disease Mother     Diabetes Mother     High Cholesterol Mother     Cancer Father         leukemia    Hypertension Sister         Sisters are twins    Hypertension Brother     Heart Disease Brother        ADVANCE DIRECTIVE: N, <no information>    KEY[place] / Nabeel Driver is here for Annual Exam       Current concerns: none     Urinary symptoms: no    Sexually active: Yes     Wears seatbelts: yes     Regular exercise: yes  Ever been transfused or tattooed?: yes    Last eye exam: up to date: Yes       No results found. Hearing:normal :Yes    Last dental exam and preventative dental cleaning: up to date : Yes    Nutritional assessment: Body mass index is 34.01 kg/m². Elevated. Weight is   Wt Readings from Last 3 Encounters:   01/23/23 237 lb (107.5 kg)   09/19/22 237 lb 3.2 oz (107.6 kg)   06/20/22 230 lb 12.8 oz (104.7 kg)       Healthful diet and Physical activity counseling to prevent CVD- low carb, low fat diet, increase fruits and vegetables, and exercise 4-5 times a day 30-40minutes a day discussed    Nicotine dependence. no  Smoker, counseling given to quit smoking.     Counseling given: Not Answered  Tobacco comments: quit 2015      Alcohol use: no    Immunization History   Administered Date(s) Administered    COVID-19, MODERNA BLUE border, Primary or Immunocompromised, (age 12y+), IM, 100 mcg/0.5mL 03/29/2021, 04/26/2021, 11/16/2021    COVID-19, [place] Vaccine, Vaccine Unspecified 03/29/2021, 04/26/2021    Influenza Virus Vaccine 11/01/2011    Influenza, AFLURIA (age 1 yrs+), FLUZONE, (age 10 mo+), MDV, 0.5mL 11/01/2011, 09/18/2019    Influenza, FLUARIX, FLULAVAL, FLUZONE (age 10 mo+) AND AFLURIA, (age 1 y+), PF, 0.5mL 09/14/2018    Influenza, FLUCELVAX, (age 10 mo+), MDCK, PF, 0.5mL 02/14/2022, 09/19/2022    Td vaccine (adult) 02/15/2003    Tdap (Boostrix, Adacel) 05/29/2018    Zoster Recombinant (Shingrix) 02/15/2022, 04/18/2022         COVID-19 vaccine: Yes    Tdap one time, then Td every 10 years-up to date:  Yes    Influenza annually-up to date:  Yes    PPSV 23 in all adults 19-65 yo with chronic conditions,smokers, alcoholism,  nursing home residents; then PCV 13 at 73 yo-up to date: Yes or N/A    Colon cancer screening recommended at 39years old  The patient has ordered family history of colon cancer      The patient has nofamily history of cancer. Lab Results   Component Value Date    PSA 2.85 05/25/2022         Blood pressure is normal.  BP Readings from Last 3 Encounters:   01/23/23 120/70   09/19/22 130/82   06/20/22 (!) 136/94       Pulse is normal.  Pulse Readings from Last 3 Encounters:   01/23/23 97   09/19/22 78   06/20/22 69       A1c is   Lab Results   Component Value Date    LABA1C 5.9 01/23/2023    LABA1C 5.6 05/25/2022    LABA1C 5.6 11/15/2021       Lipid screening -    Lab Results   Component Value Date    CHOL 172 11/18/2021    CHOL 174 12/03/2020    CHOL 185 09/07/2018     Lab Results   Component Value Date    TRIG 70 11/18/2021    TRIG 58 12/03/2020    TRIG 62 09/07/2018     Lab Results   Component Value Date    HDL 55 11/18/2021    HDL 53 12/03/2020    HDL 55 09/07/2018     Lab Results   Component Value Date    LDLCHOLESTEROL 103 11/18/2021    LDLCHOLESTEROL 109 12/03/2020    LDLCHOLESTEROL 118 09/07/2018     Lab Results   Component Value Date    CHOLHDLRATIO 3.1 11/18/2021    CHOLHDLRATIO 3.3 12/03/2020    CHOLHDLRATIO 3.4 09/07/2018       Cardiovascular risk is: The 10-year ASCVD risk score (Corrina PATEL, et al., 2019) is: 8.7%    Values used to calculate the score:      Age: 45 years      Sex: Male      Is Non- : Yes      Diabetic: No      Tobacco smoker: No      Systolic Blood Pressure: 348 mmHg      Is BP treated: Yes      HDL Cholesterol: 55 mg/dL      Total Cholesterol: 172 mg/dL    Hepatitis C screening-   Lab Results   Component Value Date    HEPAIGM NONREACTIVE 02/12/2019    HEPBIGM NONREACTIVE 02/12/2019    HEPCAB NONREACTIVE 02/12/2019            [x]Negative depression screening.    []1-4 = Minimal depression   []5-9 = Mild depression   []10-14 = Moderate depression   []15-19 = Moderately severe depression   []20-27 = Severe depression    PHQ Scores 1/23/2023 9/21/2022 9/21/2022 9/19/2022 6/20/2022 2/14/2022 2/26/2021   PHQ2 Score 0 0 0 0 0 0 0   PHQ9 Score 0 0 0 0 0 0 0       Health Maintenance   Topic Date Due    COVID-19 Vaccine (4 - Booster for Moderna series) 01/11/2022    Colorectal Cancer Screen  10/11/2022    Annual Wellness Visit (AWV)  06/21/2023    Depression Monitoring  09/21/2023    A1C test (Diabetic or Prediabetic)  01/23/2024    Lipids  11/18/2026    DTaP/Tdap/Td vaccine (2 - Td or Tdap) 05/29/2028    Flu vaccine  Completed    Shingles vaccine  Completed    Hepatitis C screen  Completed    HIV screen  Completed    Hepatitis A vaccine  Aged Out    Hib vaccine  Aged Out    Meningococcal (ACWY) vaccine  Aged Out    Pneumococcal 0-64 years Vaccine  Aged Out       -rest of complaints with corresponding details per ROS    Review of Systems   Constitutional:  Negative for activity change, appetite change, fatigue, fever and unexpected weight change. HENT:  Negative for congestion, ear pain, postnasal drip, rhinorrhea, sinus pressure, sore throat and trouble swallowing. Eyes:  Negative for redness and visual disturbance. Respiratory:  Negative for cough, chest tightness, shortness of breath, wheezing and stridor. Cardiovascular:  Negative for chest pain, palpitations and leg swelling. Gastrointestinal:  Negative for abdominal distention, abdominal pain, blood in stool, constipation, diarrhea, nausea, rectal pain and vomiting. Endocrine: Negative for polydipsia, polyphagia and polyuria. Genitourinary:  Negative for difficulty urinating, flank pain, frequency and urgency. Musculoskeletal:  Negative for arthralgias, back pain, gait problem, myalgias and neck pain. Skin:  Negative for color change, rash and wound. Allergic/Immunologic: Negative for food allergies and immunocompromised state.    Neurological:  Negative for dizziness, speech difficulty, weakness, light-headedness, numbness and headaches. Psychiatric/Behavioral:  Negative for agitation, behavioral problems, decreased concentration, dysphoric mood, hallucinations, sleep disturbance and suicidal ideas. The patient is nervous/anxious.        -vital signs stable and within normal limits except     /70   Pulse 97   Temp 97.7 °F (36.5 °C)   Ht 5' 10\" (1.778 m)   Wt 237 lb (107.5 kg)   SpO2 98%   BMI 34.01 kg/m²   Vitals:    01/23/23 1031   BP: 120/70   Pulse: 97   Temp: 97.7 °F (36.5 °C)   SpO2: 98%   Weight: 237 lb (107.5 kg)   Height: 5' 10\" (1.778 m)     Estimated body mass index is 34.01 kg/m² as calculated from the following:    Height as of this encounter: 5' 10\" (1.778 m). Weight as of this encounter: 237 lb (107.5 kg). Physical Exam  Vitals and nursing note reviewed. Constitutional:       General: He is not in acute distress. Appearance: Normal appearance. He is well-developed. He is obese. He is not diaphoretic. HENT:      Head: Normocephalic and atraumatic. Right Ear: Tympanic membrane and ear canal normal.      Left Ear: Tympanic membrane and ear canal normal.      Nose: Nose normal.   Eyes:      General:         Right eye: No discharge. Left eye: No discharge. Extraocular Movements: Extraocular movements intact. Conjunctiva/sclera: Conjunctivae normal.      Pupils: Pupils are equal, round, and reactive to light. Neck:      Thyroid: No thyromegaly. Cardiovascular:      Rate and Rhythm: Normal rate and regular rhythm. Heart sounds: Normal heart sounds. No murmur heard. Pulmonary:      Effort: Pulmonary effort is normal. No respiratory distress. Breath sounds: Normal breath sounds. No wheezing or rhonchi. Abdominal:      General: Bowel sounds are normal. There is no distension. Palpations: Abdomen is soft. There is no mass. Tenderness: There is no abdominal tenderness.  There is no right CVA tenderness, left CVA tenderness, guarding or rebound. Musculoskeletal:         General: No tenderness. Cervical back: Normal range of motion and neck supple. No rigidity or spasms. Thoracic back: No spasms. Normal range of motion. Lumbar back: No spasms. Normal range of motion. Lymphadenopathy:      Cervical: No cervical adenopathy. Skin:     Coloration: Skin is not jaundiced or pale. Findings: No bruising, erythema or rash. Neurological:      General: No focal deficit present. Mental Status: He is alert and oriented to person, place, and time. Cranial Nerves: No cranial nerve deficit. Sensory: No sensory deficit. Motor: No weakness or tremor. Coordination: Coordination normal.      Gait: Gait and tandem walk normal.      Deep Tendon Reflexes: Reflexes are normal and symmetric. Psychiatric:         Attention and Perception: Attention and perception normal. He is attentive. Mood and Affect: Mood is anxious. Mood is not depressed. Affect is not tearful. Speech: He is communicative. Speech is not rapid and pressured, delayed or slurred. Behavior: Behavior normal. Behavior is not agitated or slowed. Thought Content: Thought content normal.         Judgment: Judgment normal.       No flowsheet data found. I personally reviewed testing with patient.       Otherwise labs within normal limits      Lab Results   Component Value Date    WBC 3.8 11/18/2021    HGB 14.8 11/18/2021    HCT 43.2 11/18/2021    MCV 85.1 11/18/2021     11/18/2021       Lab Results   Component Value Date/Time     05/25/2022 09:10 AM    K 4.2 05/25/2022 09:10 AM     05/25/2022 09:10 AM    CO2 27 05/25/2022 09:10 AM    BUN 13 05/25/2022 09:10 AM    CREATININE 1.13 05/25/2022 09:10 AM    GLUCOSE 108 05/25/2022 09:10 AM    CALCIUM 9.6 05/25/2022 09:10 AM        Lab Results   Component Value Date    ALT 22 05/25/2022    AST 22 05/25/2022    ALKPHOS 83 05/25/2022    BILITOT 0.56 05/25/2022       Lab Results   Component Value Date    TSH 1.19 01/09/2020       Lab Results   Component Value Date    LDLCHOLESTEROL 103 11/18/2021       Lab Results   Component Value Date    LABA1C 5.9 01/23/2023       Lab Results   Component Value Date    BOSILDZP18 233 02/12/2019       Lab Results   Component Value Date    FOLATE >20.0 02/12/2019       Lab Results   Component Value Date    IRON 91 02/12/2019    TIBC 260 02/12/2019       Lab Results   Component Value Date    VITD25 20.9 (L) 11/18/2021         Orders Placed This Encounter   Medications    amLODIPine (NORVASC) 10 MG tablet     Sig: TAKE 1 TABLET BY MOUTH EVERY DAY     Dispense:  90 tablet     Refill:  5         Medications Discontinued During This Encounter   Medication Reason    amphetamine-dextroamphetamine (ADDERALL) 15 MG tablet Patient Choice    amphetamine-dextroamphetamine (ADDERALL XR) 20 MG extended release capsule DUPLICATE    zoster recombinant adjuvanted vaccine (SHINGRIX) 50 MCG/0.5ML SUSR injection LIST CLEANUP    amLODIPine (NORVASC) 10 MG tablet REORDER         Orders Placed This Encounter   Procedures    CBC with Auto Differential     Standing Status:   Future     Standing Expiration Date:   1/24/2024    Comprehensive Metabolic Panel     Fasting 8 hrs     Standing Status:   Future     Standing Expiration Date:   1/23/2024    Lipid Panel     Standing Status:   Future     Standing Expiration Date:   1/23/2024     Order Specific Question:   Is Patient Fasting?/# of Hours     Answer:   yes, 8-10 hours    Vitamin D 25 Hydroxy     Standing Status:   Future     Standing Expiration Date:   1/23/2024    Urinalysis with Reflex to Culture     Standing Status:   Future     Standing Expiration Date:   1/23/2024     Order Specific Question:   SPECIFY(EX-CATH,MIDSTREAM,CYSTO,ETC)?      Answer:   midstream    Uric Acid     Standing Status:   Future     Standing Expiration Date:   1/23/2024    KRISTI Screen With Reflex Standing Status:   Future     Standing Expiration Date:   1/23/2024    POCT glycosylated hemoglobin (Hb A1C)    OK Behavior  obesity 15m []    OK Intens behave ther cardio dx, 15 minutes []         This note was completed by using the assistance of a speech-recognition program. However, inadvertent computerized transcription errors may be present. Although every effort was made to ensure accuracy, no guarantees can be provided that every mistake has been identified and corrected by editing. An electronic signature was used to authenticate this note. Electronically signed by Marlyn Valerio MD on 1/23/2023 at 12:10 PM      Advance Care Planning   The patient has the following advanced directives on file:  Advance Directives       Power of [address] Will ACP-Advance Directive ACP-Power of     Not on File Not on File Not on File Not on File            The patient has appointed the following active healthcare agents: The Patient has the following current code status:    Code Status: Not on file    Visit Documentation:  I discussed [address] with Alden Hernández today which included the importance of making their choices for care and treatment in the case of a health event that adversely affects their decision-making abilities. He has not completed the Advance Care Directives. He has an active health care agent at this time. Alden Hernández was encouraged to complete the declaration forms and provide a signed copy of his medical records. I advised patient we would continue this discussion at future visits. Time spent 30 min      Marlyn Valerio MD  1/23/2023         Obesity Counseling: Assessed behavioral health risks and factors affecting choice of behavior. Suggested weight control approaches, including dietary changes behavioral modification and follow up plan. Provided educational and support documentation.   Time spent (minutes): 10  Cardiovascular Disease Risk Counseling: Assessed the patient's risk to develop cardiovascular disease and reviewed main risk factors. Reviewed steps to reduce disease risk including:   Quitting tobacco use, reducing amount smoked, or not starting the habit  Making healthy food choices  Being physically active and gradualy increasing activity levels   Reduce weight and determine a healthy BMI goal  Monitor blood pressure and treat if higher than 140/90 mmHg  Maintain blood total cholesterol levels under 5 mmol/l or 190 mg/dl  Maintain LDL cholesterol levels under 3.0 mmol/l or 115 mg/dl   Control blood glucose levels  Consider taking aspirin (75 mg daily), once blood pressure is controlled   Provided a follow up plan.   Time spent (minutes): 10

## 2023-01-23 NOTE — PATIENT INSTRUCTIONS
Advance Directives: Care Instructions  Overview  An advance directive is a legal way to state your wishes at the end of your life. It tells your family and your doctor what to do if you can't say what you want. There are two main types of advance directives. You can change them any time your wishes change. Living will. This form tells your family and your doctor your wishes about life support and other treatment. The form is also called a declaration. Medical power of . This form lets you name a person to make treatment decisions for you when you can't speak for yourself. This person is called a health care agent (health care proxy, health care surrogate). The form is also called a durable power of  for health care. If you do not have an advance directive, decisions about your medical care may be made by a family member, or by a doctor or a  who doesn't know you. It may help to think of an advance directive as a gift to the people who care for you. If you have one, they won't have to make tough decisions by themselves. For more information, including forms for your state, see the [address] website (www.caringinfo.org/planning/advance-directives/). Follow-up care is a key part of your treatment and safety. Be sure to make and go to all appointments, and call your doctor if you are having problems. It's also a good idea to know your test results and keep a list of the medicines you take. What should you include in an advance directive? Many states have a unique advance directive form. (It may ask you to address specific issues.) Or you might use a universal form that's approved by many states. If your form doesn't tell you what to address, it may be hard to know what to include in your advance directive. Use the questions below to help you get started. Who do you want to make decisions about your medical care if you are not able to?   What life-support measures do you want if you have a serious illness that gets worse over time or can't be cured?  What are you most afraid of that might happen? (Maybe you're afraid of having pain, losing your independence, or being kept alive by machines.)  Where would you prefer to die? (Your home? A hospital? A nursing home?)  Do you want to donate your organs when you die?  Do you want certain Scientology practices performed before you die?  When should you call for help?  Be sure to contact your doctor if you have any questions.  Where can you learn more?  Go to https://www.OneTouch.net/patientEd and enter R264 to learn more about \"Advance Directives: Care Instructions.\"  Current as of: June 16, 2022               Content Version: 13.5  © 1587-3314 [employer]   Care instructions adapted under license by [employer]. If you have questions about a medical condition or this instruction, always ask your healthcare professional. [employer] disclaims any warranty or liability for your use of this information.           Learning About Medical Power of   What is a medical power of ?     A medical power of , also called a durable power of  for health care, is one type of the legal forms called advance directives. It lets you name the person you want to make treatment decisions for you if you can't speak or decide for yourself. The person you choose is called your health care agent. This person is also called a health care proxy or health care surrogate.  A medical power of  may be called something else in your state.  How do you choose a health care agent?  Choose your health care agent carefully. This person may or may not be a family member.  Talk to the person before you make your final decision. Make sure he or she is comfortable with this responsibility.  It's a good idea to choose someone who:  Is at least 18 years old.  Knows you well and understands what makes life meaningful for  you.  Understands your Taoist and moral values. Will do what you want, not what he or she wants. Will be able to make difficult choices at a stressful time. Will be able to refuse or stop treatment, if that is what you would want, even if you could die. Will be firm and confident with health professionals if needed. Will ask questions to get needed information. Lives near you or agrees to travel to you if needed. Your family may help you make medical decisions while you can still be part of that process. But it's important to choose one person to be your health care agent in case you aren't able to make decisions for yourself. If you don't fill out the legal form and name a health care agent, the decisions your family can make may be limited. A health care agent may be called something else in your state. Who will make decisions for you if you don't have a health care agent? If you don't have a health care agent or a living will, you may not get the care you want. Decisions may be made by family members who disagree about your medical care. Or decisions may be made by a medical professional who doesn't know you well. In some cases, a  makes the decisions. When you name a health care agent, it is very clear who has the power to make health decisions for you. How do you name a health care agent? You name your health care agent on a legal form. This form is usually called a medical power of . Ask your hospital, state bar association, or office on aging where to find these forms. You must sign the form to make it legal. Some states require you to get the form notarized. This means that a person called a  watches you sign the form and then he or she signs the form. Some states also require that two or more witnesses sign the form. Be sure to tell your family members and doctors who your health care agent is. Where can you learn more?   Go to http://Perfect Commerce.woods.com/ and enter P737 to learn more about \"Learning About [address]. \"  Current as of: October 6, 2021               Content Version: 13.5  © 0602-0688 Healthwise, [employer]. Care instructions adapted under license by [address] If you have questions about a medical condition or this instruction, always ask your healthcare professional. [address] any warranty or liability for your use of this information. Learning About Living Bernardo Clementina  What is a living will? A living will, also called a declaration, is a legal form. It tells your family and your doctor your wishes when you can't speak for yourself. It's used by the health professionals who will treat you as you near the end of your life or if you get seriously hurt or ill. If you put your wishes in writing, your loved ones and others will know what kind of care you want. They won't need to guess. This can ease your mind and be helpful to others. And you can change or cancel your living will at any time. A living will is not the same as an estate or property will. An estate will explains what you want to happen with your money and property after you die. How do you use it? Keep these facts in mind about how a living will is used. Your living will is used only if you can't speak or make decisions for yourself. Most often, one or more doctors must certify that you can't speak or decide for yourself before your living will takes effect. If you get better and can speak for yourself again, you can accept or refuse any treatment. It doesn't matter what you said in your living will. Some states may limit your right to refuse treatment in certain cases. For example, you may need to clearly state in your living will that you don't want artificial hydration and nutrition, such as being fed through a tube. Is a living will a legal document? A living will is a legal document.  Each state has its own laws about living lepe. And a living will may be called something else in your state. Here are some things to know about living lepe. You don't need an  to complete a living will. But legal advice can be helpful if your state's laws are unclear. It can also help if your health history is complicated or your family can't agree on what should be in your living will. You can change your living will at any time. Some people find that their wishes about end-of-life care change as their health changes. If you make big changes to your living will, complete a new form. If you move to another state, make sure that your living will is legal in the state where you now live. In most cases, doctors will respect your wishes even if you have a form from a different state. You might use a universal form that has been approved by many states. This kind of form can sometimes be filled out and stored online. Your digital copy will then be available wherever you have a connection to the internet. The doctors and nurses who need to treat you can find it right away. Your state may offer an online registry. This is another place where you can store your living will online. It's a good idea to get your living will notarized. This means using a person called a  to watch two people sign, or witness, your living will. What should you know when you create a living will? Here are some questions to ask yourself as you make your living will. Do you know enough about life support methods that might be used? If not, talk to your doctor so you know what might be done if you can't breathe on your own, your heart stops, or you can't swallow. What things would you still want to be able to do after you receive life-support methods? Would you want to be able to walk? To speak? To eat on your own? To live without the help of machines?   Do you want certain Scientology practices performed if you become very ill?  If you have a choice, where do you want to be cared for? In your home? At a hospital or nursing home? If you have a choice at the end of your life, where would you prefer to die? At home? In a hospital or nursing home? Somewhere else? Would you prefer to be buried or cremated? Do you want your organs to be donated after you die? What should you do with your living will? Make sure that your family members and your health care agent have copies of your living will (also called a declaration). Give your doctor a copy of your living will. Ask to have it kept as part of your medical record. If you have more than one doctor, make sure that each one has a copy. Put a copy of your living will where it can be easily found. For example, some people may put a copy on their refrigerator door. If you are using a digital copy, be sure your doctor, family members, and health care agent know how to find and access it. Where can you learn more? Go to http://www.barnes.com/ and enter K356 to learn more about \"Learning About Living [place]. \"  Current as of: June 16, 2022               Content Version: 13.5  © 5838-7848 Healthwise, [employer]. Care instructions adapted under license by [facility] ([facility]). If you have questions about a medical condition or this instruction, always ask your healthcare professional. [place] [address] any warranty or liability for your use of this information. Starting a Weight Loss Plan: Care Instructions  Overview     If you're thinking about losing weight, it can be hard to know where to start. Your doctor can help you set up a weight loss plan that best meets your needs. You may want to take a class on nutrition or exercise, or you could join a weight loss support group.  If you have questions about how to make changes to your eating or exercise habits, ask your doctor about seeing a registered dietitian or an exercise specialist.  It can be a big challenge to lose weight. But you don't have to make huge changes at once. Make small changes, and stick with them. When those changes become habit, add a few more changes. If you don't think you're ready to make changes right now, try to pick a date in the future. Make an appointment to see your doctor to discuss whether the time is right for you to start a plan. Follow-up care is a key part of your treatment and safety. Be sure to make and go to all appointments, and call your doctor if you are having problems. It's also a good idea to know your test results and keep a list of the medicines you take. How can you care for yourself at home? Set realistic goals. Many people expect to lose much more weight than is likely. A weight loss of 5% to 10% of your body weight may be enough to improve your health. Get family and friends involved to provide support. Talk to them about why you are trying to lose weight, and ask them to help. They can help by participating in exercise and having meals with you, even if they may be eating something different. Find what works best for you. If you do not have time or do not like to cook, a program that offers meal replacement bars or shakes may be better for you. Or if you like to prepare meals, finding a plan that includes daily menus and recipes may be best.  Ask your doctor about other health professionals who can help you achieve your weight loss goals. A dietitian can help you make healthy changes in your diet. An exercise specialist or  can help you develop a safe and effective exercise program.  A counselor or psychiatrist can help you cope with issues such as depression, anxiety, or family problems that can make it hard to focus on weight loss. Consider joining a support group for people who are trying to lose weight. Your doctor can suggest groups in your area. Where can you learn more?   Go to http://www.woods.com/ and enter U357 to learn more about \"Starting a Weight Loss Plan: Care Instructions. \"  Current as of: August 25, 2022               Content Version: 13.5  © 2006-2022 Healthwise, [employer]. Care instructions adapted under license by [facility] ([facility]). If you have questions about a medical condition or this instruction, always ask your healthcare professional. [place] [address] any warranty or liability for your use of this information.

## 2023-02-26 DIAGNOSIS — I10 ESSENTIAL HYPERTENSION: ICD-10-CM

## 2023-02-27 RX ORDER — AMLODIPINE BESYLATE 10 MG/1
TABLET ORAL
Qty: 90 TABLET | Refills: 5 | Status: SHIPPED | OUTPATIENT
Start: 2023-02-27

## 2023-02-27 NOTE — TELEPHONE ENCOUNTER
Please Approve or Refuse.   Send to Pharmacy per Pt's Request:      Next Visit Date:  5/23/2023   Last Visit Date: 1/23/2023    Hemoglobin A1C (%)   Date Value   01/23/2023 5.9   05/25/2022 5.6   11/15/2021 5.6             ( goal A1C is < 7)   BP Readings from Last 3 Encounters:   01/23/23 120/70   09/19/22 130/82   06/20/22 (!) 136/94          (goal 120/80)  BUN   Date Value Ref Range Status   05/25/2022 13 6 - 20 mg/dL Final     Creatinine   Date Value Ref Range Status   05/25/2022 1.13 0.70 - 1.20 mg/dL Final     Potassium   Date Value Ref Range Status   05/25/2022 4.2 3.7 - 5.3 mmol/L Final

## 2023-03-15 ENCOUNTER — OFFICE VISIT (OUTPATIENT)
Dept: FAMILY MEDICINE CLINIC | Age: 53
End: 2023-03-15
Payer: MEDICARE

## 2023-03-15 VITALS
OXYGEN SATURATION: 99 % | SYSTOLIC BLOOD PRESSURE: 142 MMHG | DIASTOLIC BLOOD PRESSURE: 98 MMHG | TEMPERATURE: 98.7 F | HEART RATE: 93 BPM | BODY MASS INDEX: 33.4 KG/M2 | WEIGHT: 232.8 LBS

## 2023-03-15 DIAGNOSIS — I10 ESSENTIAL HYPERTENSION: ICD-10-CM

## 2023-03-15 DIAGNOSIS — N45.1 LEFT EPIDIDYMITIS: Primary | ICD-10-CM

## 2023-03-15 DIAGNOSIS — N43.1 INFECTED HYDROCELE: ICD-10-CM

## 2023-03-15 DIAGNOSIS — N50.89 SCROTAL SWELLING: ICD-10-CM

## 2023-03-15 PROCEDURE — G8427 DOCREV CUR MEDS BY ELIG CLIN: HCPCS | Performed by: FAMILY MEDICINE

## 2023-03-15 PROCEDURE — 3017F COLORECTAL CA SCREEN DOC REV: CPT | Performed by: FAMILY MEDICINE

## 2023-03-15 PROCEDURE — 1036F TOBACCO NON-USER: CPT | Performed by: FAMILY MEDICINE

## 2023-03-15 PROCEDURE — 3080F DIAST BP >= 90 MM HG: CPT | Performed by: FAMILY MEDICINE

## 2023-03-15 PROCEDURE — 99214 OFFICE O/P EST MOD 30 MIN: CPT | Performed by: FAMILY MEDICINE

## 2023-03-15 PROCEDURE — G8417 CALC BMI ABV UP PARAM F/U: HCPCS | Performed by: FAMILY MEDICINE

## 2023-03-15 PROCEDURE — 3077F SYST BP >= 140 MM HG: CPT | Performed by: FAMILY MEDICINE

## 2023-03-15 PROCEDURE — G8482 FLU IMMUNIZE ORDER/ADMIN: HCPCS | Performed by: FAMILY MEDICINE

## 2023-03-15 RX ORDER — PYRAZINAMIDE 500 MG/1
1 TABLET ORAL EVERY 8 HOURS PRN
Qty: 9 TABLET | Refills: 0 | Status: SHIPPED | OUTPATIENT
Start: 2023-03-15 | End: 2023-03-18

## 2023-03-15 RX ORDER — CIPROFLOXACIN 500 MG/1
500 TABLET, FILM COATED ORAL 2 TIMES DAILY
Qty: 20 TABLET | Refills: 0 | Status: SHIPPED | OUTPATIENT
Start: 2023-03-15 | End: 2023-03-25

## 2023-03-15 RX ORDER — EMTRICITABINE AND TENOFOVIR ALAFENAMIDE 200; 25 MG/1; MG/1
TABLET ORAL
COMMUNITY
Start: 2023-03-08

## 2023-03-15 RX ORDER — HYDROCODONE BITARTRATE AND ACETAMINOPHEN 10; 325 MG/1; MG/1
1 TABLET ORAL EVERY 4 HOURS PRN
COMMUNITY
Start: 2023-03-02

## 2023-03-15 SDOH — ECONOMIC STABILITY: FOOD INSECURITY: WITHIN THE PAST 12 MONTHS, YOU WORRIED THAT YOUR FOOD WOULD RUN OUT BEFORE YOU GOT MONEY TO BUY MORE.: NEVER TRUE

## 2023-03-15 SDOH — ECONOMIC STABILITY: INCOME INSECURITY: HOW HARD IS IT FOR YOU TO PAY FOR THE VERY BASICS LIKE FOOD, HOUSING, MEDICAL CARE, AND HEATING?: NOT HARD AT ALL

## 2023-03-15 SDOH — ECONOMIC STABILITY: FOOD INSECURITY: WITHIN THE PAST 12 MONTHS, THE FOOD YOU BOUGHT JUST DIDN'T LAST AND YOU DIDN'T HAVE MONEY TO GET MORE.: NEVER TRUE

## 2023-03-15 ASSESSMENT — PATIENT HEALTH QUESTIONNAIRE - PHQ9
SUM OF ALL RESPONSES TO PHQ QUESTIONS 1-9: 2
SUM OF ALL RESPONSES TO PHQ QUESTIONS 1-9: 2
1. LITTLE INTEREST OR PLEASURE IN DOING THINGS: 0
9. THOUGHTS THAT YOU WOULD BE BETTER OFF DEAD, OR OF HURTING YOURSELF: 0
SUM OF ALL RESPONSES TO PHQ QUESTIONS 1-9: 2
10. IF YOU CHECKED OFF ANY PROBLEMS, HOW DIFFICULT HAVE THESE PROBLEMS MADE IT FOR YOU TO DO YOUR WORK, TAKE CARE OF THINGS AT HOME, OR GET ALONG WITH OTHER PEOPLE: 0
SUM OF ALL RESPONSES TO PHQ9 QUESTIONS 1 & 2: 0
7. TROUBLE CONCENTRATING ON THINGS, SUCH AS READING THE NEWSPAPER OR WATCHING TELEVISION: 0
6. FEELING BAD ABOUT YOURSELF - OR THAT YOU ARE A FAILURE OR HAVE LET YOURSELF OR YOUR FAMILY DOWN: 0
3. TROUBLE FALLING OR STAYING ASLEEP: 1
5. POOR APPETITE OR OVEREATING: 0
8. MOVING OR SPEAKING SO SLOWLY THAT OTHER PEOPLE COULD HAVE NOTICED. OR THE OPPOSITE, BEING SO FIGETY OR RESTLESS THAT YOU HAVE BEEN MOVING AROUND A LOT MORE THAN USUAL: 0
2. FEELING DOWN, DEPRESSED OR HOPELESS: 0
4. FEELING TIRED OR HAVING LITTLE ENERGY: 1
SUM OF ALL RESPONSES TO PHQ QUESTIONS 1-9: 2

## 2023-03-15 ASSESSMENT — ENCOUNTER SYMPTOMS
SHORTNESS OF BREATH: 0
DIARRHEA: 0
COLOR CHANGE: 0
BACK PAIN: 0
NAUSEA: 0
RECTAL PAIN: 0
BLOOD IN STOOL: 0
SINUS PRESSURE: 0
RHINORRHEA: 0
ABDOMINAL PAIN: 0
CONSTIPATION: 0
STRIDOR: 0
VOMITING: 0
TROUBLE SWALLOWING: 0
SORE THROAT: 0
ABDOMINAL DISTENTION: 0
EYE REDNESS: 0
WHEEZING: 0
CHEST TIGHTNESS: 0
COUGH: 0

## 2023-03-15 NOTE — PATIENT INSTRUCTIONS
Thank you for choosing [facility]) as your healthcare provider as your care is important to us. Please arrive at your appointment on time. If you are unable to make your appointment, please call our office as soon as possible so that we may reschedule your appointment. Missing 3 appointments in a calendar year without notifying the office may lead to dismissal from the practice. We appreciate you calling at least 24 hours in advance, when possible. Thank you. New Updates for [facility]    Thank you for choosing [facility] to give you the best care! [facility]) is always trying to think of new ways to help their patients. We are asking all patients to try out the new digital registration that is now available through your [facility] account Via the anya you're now able to update your personal and registration information prior to your upcoming appointment. This will save you time once you arrive at the office to check-in, not to mention your information remains safe!! Many other perks come from signing up for an account, such as:  Requesting refills  Scheduling an appointment  Completing an E-Visit  Sending a message to the office/provider  Having access to your medication list  Paying your bill/copay prior to your appointment  Scheduling your yearly mammogram  Review your test results    If you are not familiar with [facility] please ask one of us and we will be happy to answer any questions or help you set-up your account.       Your [employer],

## 2023-03-15 NOTE — PROGRESS NOTES
Chief Complaint   Patient presents with    Edema     LEFT TESTICLE/WENT TO ED IN [place]  TO BE Natalya Chapin  here today for follow up on chronic medical problems, go over labs and/or diagnostic studies, and medication refills. Edema (LEFT TESTICLE/WENT TO ED IN [place]  TO BE SEEN)      HPI: Patient is scheduled for ER visit, patient, patient reports he was in [place] and he noticed swelling on the left testicle and also right, he went to ER results are in care everywhere. Patient had ultrasound scrotum done that showed hydrocele, inflammation thickening of inner lining of scrotum. Patient had bilateral epididymides. He was started on doxycycline, reports swelling mildly improved but he is still painful. He was given Norco he never received that because the pharmacy did not had that medication at that time. Patient reports he still feels pain and swelling on the left side of the testicle, he had urine test which was normal, he also had STD test done at urgent care, patient reports that was also negative. Results are showing chlamydia and gonorrhea negative, syphilis negative HIV and hepatitis C negative. Patient denies any increased frequency of urination, denies any burning sensation, flank pain. Controlled Substance Monitoring:    Acute and Chronic Pain Monitoring:   RX Monitoring 3/15/2023   Periodic Controlled Substance Monitoring No signs of potential drug abuse or diversion identified. ;Assessed functional status. Ultrasound of the testicles. The right testicle measures 4.8 x 3.0 x 3.1 cm. There is a moderate size right   hydrocele with internal echogenic debris. There is demonstrated arterial and   venous flow of the right testicle. The right epididymis measures 1.7 x 2.4 cm. The right epididymis has an edematous appearance. There is hyperemia of the   right epididymis. Right epididymal cysts are noted, the largest measures 9 x 8   x 8 mm.      The left testicle measures 3.5 x 2.9 x 3.0 cm. There is a small size left   hydrocele with internal echogenic debris. There is demonstrated arterial and   venous flow of the left testicle. The left epididymis measures 1.3 x 1.8 x 1.8   cm. The left epididymis has an edematous appearance. There is hyperemia of the   left epididymis. There is mild scrotal wall thickening of the left measuring 6   mm. Hypertension, usually controlled, blood pressure is high today likely due to pain. Patient will monitor blood pressure at home and will call back with results. BP (!) 142/98   Pulse 93   Temp 98.7 °F (37.1 °C)   Wt 232 lb 12.8 oz (105.6 kg)   SpO2 99%   BMI 33.40 kg/m²    Body mass index is 33.4 kg/m². Wt Readings from Last 3 Encounters:   03/15/23 232 lb 12.8 oz (105.6 kg)   01/23/23 237 lb (107.5 kg)   09/19/22 237 lb 3.2 oz (107.6 kg)        []Negative depression screening. PHQ Scores 3/15/2023 1/23/2023 9/21/2022 9/21/2022 9/19/2022 6/20/2022 2/14/2022   PHQ2 Score 0 0 0 0 0 0 0   PHQ9 Score 2 0 0 0 0 0 0      [x]1-4 = Minimal depression   []5-9 = Milddepression   []10-14 = Moderate depression   []15-19 = Moderately severe depression   []20-27 = Severe depression    Discussed testing with the patient and all questions fully answered.     Office Visit on 01/23/2023   Component Date Value Ref Range Status    Hemoglobin A1C 01/23/2023 5.9  % Final         Most recent labs reviewed:     Lab Results   Component Value Date    WBC 3.8 11/18/2021    HGB 14.8 11/18/2021    HCT 43.2 11/18/2021    MCV 85.1 11/18/2021     11/18/2021       @BRIEFLAB(NA,K,CL,CO2,BUN,CREATININE,GLUCOSE,CALCIUM)@     Lab Results   Component Value Date    ALT 22 05/25/2022    AST 22 05/25/2022    ALKPHOS 83 05/25/2022    BILITOT 0.56 05/25/2022       Lab Results   Component Value Date    TSH 1.19 01/09/2020       Lab Results   Component Value Date    CHOL 172 11/18/2021    CHOL 174 12/03/2020    CHOL 185 09/07/2018     Lab Results Component Value Date    TRIG 70 11/18/2021    TRIG 58 12/03/2020    TRIG 62 09/07/2018     Lab Results   Component Value Date    HDL 55 11/18/2021    HDL 53 12/03/2020    HDL 55 09/07/2018     Lab Results   Component Value Date    LDLCHOLESTEROL 103 11/18/2021    LDLCHOLESTEROL 109 12/03/2020    LDLCHOLESTEROL 118 09/07/2018     Lab Results   Component Value Date    VLDL NOT REPORTED 11/18/2021    VLDL NOT REPORTED 12/03/2020    VLDL NOT REPORTED 09/07/2018     Lab Results   Component Value Date    CHOLHDLRATIO 3.1 11/18/2021    CHOLHDLRATIO 3.3 12/03/2020    CHOLHDLRATIO 3.4 09/07/2018       Lab Results   Component Value Date    LABA1C 5.9 01/23/2023       Lab Results   Component Value Date    IPBHGMEN78 817 02/12/2019       Lab Results   Component Value Date    FOLATE >20.0 02/12/2019       Lab Results   Component Value Date    IRON 91 02/12/2019    TIBC 260 02/12/2019       Lab Results   Component Value Date    VITD25 20.9 (L) 11/18/2021             Current Outpatient Medications   Medication Sig Dispense Refill    ciprofloxacin (CIPRO) 500 MG tablet Take 1 tablet by mouth 2 times daily for 10 days 20 tablet 0    acetaminophen-codeine (TYLENOL/CODEINE #3) 300-30 MG per tablet Take 1 tablet by mouth every 8 hours as needed for Pain for up to 3 days. Intended supply: 7 days.  Take lowest dose possible to manage pain Max Daily Amount: 3 tablets 9 tablet 0    amLODIPine (NORVASC) 10 MG tablet TAKE 1 TABLET BY MOUTH EVERY DAY 90 tablet 5    tamsulosin (FLOMAX) 0.4 MG capsule TAKE 1 CAPSULE BY MOUTH EVERY DAY 90 capsule 1    vitamin D (ERGOCALCIFEROL) 1.25 MG (37352 UT) CAPS capsule TAKE 1 CAPSULE BY MOUTH ONE TIME A WEEK 12 capsule 0    sildenafil (VIAGRA) 100 MG tablet TAKE 1 TABLET BY MOUTH one time a day as NEEDED FOR ERECTILE DYSFUNCTION 60 tablet 1    lamoTRIgine (LAMICTAL) 100 MG tablet TAKE 1 TABLET BY MOUTH IN THE MORNING      amphetamine-dextroamphetamine (ADDERALL) 20 MG tablet       hydroCHLOROthiazide (HYDRODIURIL) 25 MG tablet Take 1 tablet by mouth every morning 90 tablet 1    potassium chloride (KLOR-CON M) 10 MEQ extended release tablet Take 1 tablet by mouth daily 90 tablet 1    valACYclovir (VALTREX) 500 MG tablet TAKE 1 TABLET BY MOUTH ONE TIME A DAY. start within 72 hours of symptom onset 90 tablet 1    sertraline (ZOLOFT) 50 MG tablet Take 50 mg by mouth daily       DESCOVY 200-25 MG TABS tablet TAKE 1 TABLET BY MOUTH EVERY DAY (Patient not taking: Reported on 3/15/2023)      HYDROcodone-acetaminophen (NORCO)  MG per tablet Take 1 tablet by mouth every 4 hours as needed. (Patient not taking: No sig reported)      EPINEPHrine (EPIPEN 2-LAURA) 0.3 MG/0.3ML SOAJ injection Inject 0.3 mLs into the muscle once as needed (severe allergic reaction) 2 each 1     No current facility-administered medications for this visit.              Social History     Socioeconomic History    Marital status:      Spouse name: Not on file    Number of children: Not on file    Years of education: Not on file    Highest education level: Not on file   Occupational History    Not on file   Tobacco Use    Smoking status: Former     Packs/day: 0.50     Years: 15.00     Pack years: 7.50     Types: Cigarettes     Quit date: 2015     Years since quittin.2    Smokeless tobacco: Never    Tobacco comments:     quit    Vaping Use    Vaping Use: Never used   Substance and Sexual Activity    Alcohol use: No     Comment: none since     Drug use: No    Sexual activity: Yes     Partners: Female   Other Topics Concern    Not on file   Social History Narrative    Not on file     Social Determinants of Health     Financial Resource Strain: Low Risk     Difficulty of Paying Living Expenses: Not hard at all   Food Insecurity: No Food Insecurity    Worried About [address] in the Last Year: Never true    [address] in the Last Year: Never true   Transportation Needs: No Transportation Needs    Lack of Transportation (Medical): No    Lack of Transportation (Non-Medical): No   Physical Activity: Sufficiently Active    Days of Exercise per Week: 3 days    Minutes of Exercise per Session: 60 min   Stress: Not on file   Social Connections: Not on file   Intimate Partner Violence: Not on file   Housing Stability: Unknown    Unable to Pay for Housing in the Last Year: No    Number of Places Lived in the Last Year: Not on file    Unstable Housing in the Last Year: No     Counseling given: Not Answered  Tobacco comments: quit 2015        Family History   Problem Relation Age of Onset    Hypertension Mother     Heart Disease Mother     Diabetes Mother     High Cholesterol Mother     Cancer Father         leukemia    Hypertension Sister         Sisters are twins    Hypertension Brother     Heart Disease Brother              -rest of complaints with corresponding details per ROS    The patient's past medical, surgical, social, and family history as well as his current medications and allergies were reviewed as documented intoday's encounter. Review of Systems   Constitutional:  Positive for activity change. Negative for appetite change, fatigue, fever and unexpected weight change. HENT:  Negative for ear pain, postnasal drip, rhinorrhea, sinus pressure, sore throat and trouble swallowing. Eyes:  Negative for redness and visual disturbance. Respiratory:  Negative for cough, chest tightness, shortness of breath, wheezing and stridor. Cardiovascular:  Negative for chest pain, palpitations and leg swelling. Gastrointestinal:  Negative for abdominal distention, abdominal pain, blood in stool, constipation, diarrhea, nausea, rectal pain and vomiting. Genitourinary:  Positive for difficulty urinating, scrotal swelling and testicular pain. Negative for flank pain, frequency, penile pain, penile swelling and urgency. Musculoskeletal:  Negative for arthralgias, back pain, gait problem, myalgias and neck pain. Skin:  Negative for color change, rash and wound. Allergic/Immunologic: Negative for food allergies and immunocompromised state. Neurological:  Negative for dizziness, speech difficulty, weakness, light-headedness, numbness and headaches. Psychiatric/Behavioral:  Negative for agitation, behavioral problems, decreased concentration, dysphoric mood, hallucinations, sleep disturbance and suicidal ideas. The patient is nervous/anxious. Physical Exam  Vitals and nursing note reviewed. Exam conducted with a chaperone present (BALA Stiles). Constitutional:       General: He is not in acute distress. Appearance: Normal appearance. He is well-developed. He is obese. He is not diaphoretic. HENT:      Head: Normocephalic and atraumatic. Nose: Nose normal.   Eyes:      General:         Right eye: No discharge. Left eye: No discharge. Extraocular Movements: Extraocular movements intact. Conjunctiva/sclera: Conjunctivae normal.      Pupils: Pupils are equal, round, and reactive to light. Neck:      Thyroid: No thyromegaly. Cardiovascular:      Rate and Rhythm: Normal rate and regular rhythm. Heart sounds: Normal heart sounds. No murmur heard. Pulmonary:      Effort: Pulmonary effort is normal. No respiratory distress. Breath sounds: Normal breath sounds. No wheezing or rhonchi. Abdominal:      General: Bowel sounds are normal. There is no distension. Palpations: Abdomen is soft. There is no mass. Tenderness: There is no abdominal tenderness. Genitourinary:     Testes:         Right: Swelling and testicular hydrocele present. Mass not present. Left: Mass not present. Comments: Patient has tenderness on palpation on left side of the testicle. Musculoskeletal:         General: No tenderness or deformity. Cervical back: Normal range of motion and neck supple. No rigidity or spasms. Thoracic back: No spasms. Normal range of motion. Right lower leg: No edema. Left lower leg: No edema. Lymphadenopathy:      Cervical: No cervical adenopathy. Skin:     Coloration: Skin is not jaundiced or pale. Findings: No bruising, erythema or rash. Neurological:      General: No focal deficit present. Mental Status: He is alert and oriented to person, place, and time. Cranial Nerves: No cranial nerve deficit. Sensory: No sensory deficit. Motor: No weakness or tremor. Coordination: Coordination normal.      Gait: Gait and tandem walk normal.      Deep Tendon Reflexes: Reflexes are normal and symmetric. Psychiatric:         Attention and Perception: Attention and perception normal. He is attentive. Mood and Affect: Mood is anxious. Mood is not depressed. Affect is not tearful. Speech: He is communicative. Speech is not rapid and pressured, delayed or slurred. Behavior: Behavior normal. Behavior is not agitated or slowed. Thought Content: Thought content normal.         Judgment: Judgment normal.           ASSESSMENT AND PLAN      1. Left epididymitis  Not much improvement with antibiotic, start on ciprofloxacin, urine test and STD test negative, follow-up with urologist Tylenol 3 for few days due to pain. - ciprofloxacin (CIPRO) 500 MG tablet; Take 1 tablet by mouth 2 times daily for 10 days  Dispense: 20 tablet; Refill: 0  - TRENT Barlow MD, Urology, [facility]  - acetaminophen-codeine (TYLENOL/CODEINE #3) 300-30 MG per tablet; Take 1 tablet by mouth every 8 hours as needed for Pain for up to 3 days. Intended supply: 7 days. Take lowest dose possible to manage pain Max Daily Amount: 3 tablets  Dispense: 9 tablet; Refill: 0    2. Scrotal swelling  Patient still has swelling, mild hydrocele referral placed to urologist start on ciprofloxacin.  - ciprofloxacin (CIPRO) 500 MG tablet; Take 1 tablet by mouth 2 times daily for 10 days  Dispense: 20 tablet;  Refill: 0  - TRENT - Patricia Barlow MD, Urology, [place]  - acetaminophen-codeine (TYLENOL/CODEINE #3) 300-30 MG per tablet; Take 1 tablet by mouth every 8 hours as needed for Pain for up to 3 days. Intended supply: 7 days. Take lowest dose possible to manage pain Max Daily Amount: 3 tablets  Dispense: 9 tablet; Refill: 0    3. Infected hydrocele  Referral placed to urologist start on Cipro, Tylenol 3 for few days. - ciprofloxacin (CIPRO) 500 MG tablet; Take 1 tablet by mouth 2 times daily for 10 days  Dispense: 20 tablet; Refill: 0  - [facility] - Tammy Watts MD, Urology, [place]  - acetaminophen-codeine (TYLENOL/CODEINE #3) 300-30 MG per tablet; Take 1 tablet by mouth every 8 hours as needed for Pain for up to 3 days. Intended supply: 7 days. Take lowest dose possible to manage pain Max Daily Amount: 3 tablets  Dispense: 9 tablet; Refill: 0    4. Essential hypertension  Fairly controlled, likely due to pain, monitor blood pressure at home call back with blood pressure readings. Continue current treatment    Orders Placed This Encounter   Procedures    Misty Wahl MD, Urology, [place]     Referral Priority:   Routine     Referral Type:   Eval and Treat     Referral Reason:   Specialty Services Required     Referred to Provider:   Savanna Blanca MD     Requested Specialty:   Urology     Number of Visits Requested:   1         There are no discontinued medications. Xavier Kong received counseling on the following healthy behaviors: nutrition, exercise, and medication adherence  Reviewed prior labs and health maintenance  Continue current medications, diet and exercise. Discussed use, benefit, and side effects of prescribed medications. Barriers to medication compliance addressed. Patient given educational materials - see patient instructions  Was a self-tracking handout given in paper form or via [employer]?  Yes    Requested Prescriptions     Signed Prescriptions Disp Refills    ciprofloxacin (CIPRO) 500 MG tablet 20 tablet 0     Sig: Take 1 tablet by mouth 2 times daily for 10 days    acetaminophen-codeine (TYLENOL/CODEINE #3) 300-30 MG per tablet 9 tablet 0     Sig: Take 1 tablet by mouth every 8 hours as needed for Pain for up to 3 days. Intended supply: 7 days. Take lowest dose possible to manage pain Max Daily Amount: 3 tablets       All patient questions answered. Patient voiced understanding. Quality Measures    Body mass index is 33.4 kg/m². Elevated. Weight control planned discussed daily exercise regimen and Healthy diet and regular exercise. BP: (!) 142/98 Blood pressure is high. Treatment plan consists of Weight Reduction, DASH Eating Plan, Dietary Sodium Restriction, and No treatment change needed. Lab Results   Component Value Date    LDLCHOLESTEROL 103 11/18/2021    (goal LDL reduction with dx if diabetes is 50% LDL reduction)      PHQ Scores 3/15/2023 1/23/2023 9/21/2022 9/21/2022 9/19/2022 6/20/2022 2/14/2022   PHQ2 Score 0 0 0 0 0 0 0   PHQ9 Score 2 0 0 0 0 0 0     Interpretation of Total Score Depression Severity: 1-4 = Minimal depression, 5-9 = Mild depression, 10-14 = Moderate depression, 15-19 = Moderately severe depression, 20-27 = Severe depression    The patient'spast medical, surgical, social, and family history as well as his   current medications and allergies were reviewed as documented in today's encounter. Medications, labs, diagnostic studies, consultations andfollow-up as documented in this encounter. Return in about 3 months (around 6/15/2023). Patient wasseen with total face to face time of 30 minutes. More than 50% of this visit was counseling and education. Future Appointments   Date Time Provider Shruti Moore   7/13/2023 11:45 AM Tip Terry MD fp [facility]     This note was completed by using the assistance of a speech-recognition program. However, inadvertent computerized transcription errors may be present.  Althoughevery effort was made to ensure accuracy, no guarantees can be provided that every mistake has been identified and corrected by editing.   Electronically signed by Marcela Hough MD on 3/15/2023  1:53 PM

## 2023-03-15 NOTE — PROGRESS NOTES
Visit Information    Have you changed or started any medications since your last visit including any over-the-counter medicines, vitamins, or herbal medicines? no   Have you stopped taking any of your medications? Is so, why? -  no  Are you having any side effects from any of your medications? - no    Have you seen any other physician or provider since your last visit? yes -    Have you had any other diagnostic tests since your last visit? yes -    Have you been seen in the emergency room and/or had an admission in a hospital since we last saw you?  yes -    Have you had your routine dental cleaning in the past 6 months?  yes -      Do you have an active MyChart account? If no, what is the barrier?   Yes    Patient Care Team:  Tip Terry MD as PCP - General (Family Medicine)  Tip Terry MD as PCP - Empaneled Provider  Mariama Fish MD as Consulting Physician (Hematology and Oncology)  HARRY Flores - CNP as Consulting Physician (Nurse Practitioner)    Medical History Review  Past Medical, Family, and Social History reviewed and does contribute to the patient presenting condition    Health Maintenance   Topic Date Due    COVID-19 Vaccine (4 - Booster for Mitchell Daubs series) 01/11/2022    Annual Wellness Visit (AWV)  06/21/2023    A1C test (Diabetic or Prediabetic)  01/23/2024    Depression Monitoring  01/23/2024    Colorectal Cancer Screen  01/23/2026    Lipids  11/18/2026    DTaP/Tdap/Td vaccine (2 - Td or Tdap) 05/29/2028    Flu vaccine  Completed    Shingles vaccine  Completed    Hepatitis C screen  Completed    HIV screen  Completed    Hepatitis A vaccine  Aged Out    Hib vaccine  Aged Out    Meningococcal (ACWY) vaccine  Aged Out    Pneumococcal 0-64 years Vaccine  Aged Out

## 2023-04-20 ENCOUNTER — HOSPITAL ENCOUNTER (INPATIENT)
Age: 53
LOS: 1 days | Discharge: HOME | End: 2023-04-21
Attending: STUDENT IN AN ORGANIZED HEALTH CARE EDUCATION/TRAINING PROGRAM | Admitting: INTERNAL MEDICINE
Payer: MEDICARE

## 2023-04-20 ENCOUNTER — APPOINTMENT (OUTPATIENT)
Dept: CT IMAGING | Age: 53
End: 2023-04-20
Payer: MEDICARE

## 2023-04-20 DIAGNOSIS — K59.00 CONSTIPATION, UNSPECIFIED CONSTIPATION TYPE: ICD-10-CM

## 2023-04-20 DIAGNOSIS — K63.89 COLONIC MASS: Primary | ICD-10-CM

## 2023-04-20 LAB
ABSOLUTE EOS #: 0.2 K/UL (ref 0–0.4)
ABSOLUTE LYMPH #: 2 K/UL (ref 1–4.8)
ABSOLUTE MONO #: 0.6 K/UL (ref 0.1–1.3)
ALBUMIN SERPL-MCNC: 4.7 G/DL (ref 3.5–5.2)
ALP SERPL-CCNC: 69 U/L (ref 40–129)
ALT SERPL-CCNC: 26 U/L (ref 5–41)
ANION GAP SERPL CALCULATED.3IONS-SCNC: 12 MMOL/L (ref 9–17)
AST SERPL-CCNC: 24 U/L
BASOPHILS # BLD: 1 % (ref 0–2)
BASOPHILS ABSOLUTE: 0.1 K/UL (ref 0–0.2)
BILIRUB DIRECT SERPL-MCNC: 0.1 MG/DL
BILIRUB INDIRECT SERPL-MCNC: 0.4 MG/DL (ref 0–1)
BILIRUB SERPL-MCNC: 0.5 MG/DL (ref 0.3–1.2)
BUN SERPL-MCNC: 14 MG/DL (ref 6–20)
CALCIUM SERPL-MCNC: 9.7 MG/DL (ref 8.6–10.4)
CHLORIDE SERPL-SCNC: 104 MMOL/L (ref 98–107)
CO2 SERPL-SCNC: 24 MMOL/L (ref 20–31)
CREAT SERPL-MCNC: 1.28 MG/DL (ref 0.7–1.2)
EOSINOPHILS RELATIVE PERCENT: 3 % (ref 0–4)
GFR SERPL CREATININE-BSD FRML MDRD: >60 ML/MIN/1.73M2
GLUCOSE SERPL-MCNC: 103 MG/DL (ref 70–99)
HCT VFR BLD AUTO: 45.1 % (ref 41–53)
HGB BLD-MCNC: 15.3 G/DL (ref 13.5–17.5)
INR PPP: 0.9
LIPASE SERPL-CCNC: 30 U/L (ref 13–60)
LYMPHOCYTES # BLD: 36 % (ref 24–44)
MAGNESIUM SERPL-MCNC: 2.3 MG/DL (ref 1.6–2.6)
MCH RBC QN AUTO: 29 PG (ref 26–34)
MCHC RBC AUTO-ENTMCNC: 33.9 G/DL (ref 31–37)
MCV RBC AUTO: 85.6 FL (ref 80–100)
MONOCYTES # BLD: 10 % (ref 1–7)
PDW BLD-RTO: 13.5 % (ref 11.5–14.9)
PLATELET # BLD AUTO: 260 K/UL (ref 150–450)
PMV BLD AUTO: 6.8 FL (ref 6–12)
POTASSIUM SERPL-SCNC: 3.9 MMOL/L (ref 3.7–5.3)
PROT SERPL-MCNC: 7.5 G/DL (ref 6.4–8.3)
PROTHROMBIN TIME: 12.7 SEC (ref 11.8–14.6)
RBC # BLD: 5.27 M/UL (ref 4.5–5.9)
SEG NEUTROPHILS: 50 % (ref 36–66)
SEGMENTED NEUTROPHILS ABSOLUTE COUNT: 2.8 K/UL (ref 1.3–9.1)
SODIUM SERPL-SCNC: 140 MMOL/L (ref 135–144)
WBC # BLD AUTO: 5.7 K/UL (ref 3.5–11)

## 2023-04-20 PROCEDURE — 99285 EMERGENCY DEPT VISIT HI MDM: CPT

## 2023-04-20 PROCEDURE — 6360000002 HC RX W HCPCS: Performed by: NURSE PRACTITIONER

## 2023-04-20 PROCEDURE — 6370000000 HC RX 637 (ALT 250 FOR IP): Performed by: SURGERY

## 2023-04-20 PROCEDURE — 74177 CT ABD & PELVIS W/CONTRAST: CPT

## 2023-04-20 PROCEDURE — 36415 COLL VENOUS BLD VENIPUNCTURE: CPT

## 2023-04-20 PROCEDURE — 1200000000 HC SEMI PRIVATE

## 2023-04-20 PROCEDURE — 85610 PROTHROMBIN TIME: CPT

## 2023-04-20 PROCEDURE — 2580000003 HC RX 258: Performed by: NURSE PRACTITIONER

## 2023-04-20 PROCEDURE — 6360000004 HC RX CONTRAST MEDICATION: Performed by: STUDENT IN AN ORGANIZED HEALTH CARE EDUCATION/TRAINING PROGRAM

## 2023-04-20 PROCEDURE — 83735 ASSAY OF MAGNESIUM: CPT

## 2023-04-20 PROCEDURE — 2580000003 HC RX 258: Performed by: STUDENT IN AN ORGANIZED HEALTH CARE EDUCATION/TRAINING PROGRAM

## 2023-04-20 PROCEDURE — 80076 HEPATIC FUNCTION PANEL: CPT

## 2023-04-20 PROCEDURE — 83690 ASSAY OF LIPASE: CPT

## 2023-04-20 PROCEDURE — 85025 COMPLETE CBC W/AUTO DIFF WBC: CPT

## 2023-04-20 PROCEDURE — 80048 BASIC METABOLIC PNL TOTAL CA: CPT

## 2023-04-20 RX ORDER — ENOXAPARIN SODIUM 100 MG/ML
30 INJECTION SUBCUTANEOUS 2 TIMES DAILY
Status: DISCONTINUED | OUTPATIENT
Start: 2023-04-20 | End: 2023-04-21 | Stop reason: HOSPADM

## 2023-04-20 RX ORDER — 0.9 % SODIUM CHLORIDE 0.9 %
1000 INTRAVENOUS SOLUTION INTRAVENOUS ONCE
Status: COMPLETED | OUTPATIENT
Start: 2023-04-20 | End: 2023-04-20

## 2023-04-20 RX ORDER — ACETAMINOPHEN 650 MG/1
650 SUPPOSITORY RECTAL EVERY 6 HOURS PRN
Status: DISCONTINUED | OUTPATIENT
Start: 2023-04-20 | End: 2023-04-21 | Stop reason: HOSPADM

## 2023-04-20 RX ORDER — ACETAMINOPHEN 325 MG/1
650 TABLET ORAL EVERY 6 HOURS PRN
Status: DISCONTINUED | OUTPATIENT
Start: 2023-04-20 | End: 2023-04-21 | Stop reason: HOSPADM

## 2023-04-20 RX ORDER — SODIUM CHLORIDE 0.9 % (FLUSH) 0.9 %
10 SYRINGE (ML) INJECTION PRN
Status: COMPLETED | OUTPATIENT
Start: 2023-04-20 | End: 2023-04-20

## 2023-04-20 RX ORDER — SODIUM CHLORIDE 9 MG/ML
INJECTION, SOLUTION INTRAVENOUS CONTINUOUS
Status: DISCONTINUED | OUTPATIENT
Start: 2023-04-20 | End: 2023-04-21 | Stop reason: HOSPADM

## 2023-04-20 RX ORDER — SODIUM CHLORIDE 9 MG/ML
INJECTION, SOLUTION INTRAVENOUS PRN
Status: DISCONTINUED | OUTPATIENT
Start: 2023-04-20 | End: 2023-04-21 | Stop reason: HOSPADM

## 2023-04-20 RX ORDER — 0.9 % SODIUM CHLORIDE 0.9 %
100 INTRAVENOUS SOLUTION INTRAVENOUS ONCE
Status: COMPLETED | OUTPATIENT
Start: 2023-04-20 | End: 2023-04-20

## 2023-04-20 RX ORDER — ONDANSETRON 2 MG/ML
4 INJECTION INTRAMUSCULAR; INTRAVENOUS EVERY 6 HOURS PRN
Status: DISCONTINUED | OUTPATIENT
Start: 2023-04-20 | End: 2023-04-21 | Stop reason: HOSPADM

## 2023-04-20 RX ORDER — POLYETHYLENE GLYCOL 3350 17 G/17G
17 POWDER, FOR SOLUTION ORAL EVERY 8 HOURS
Status: COMPLETED | OUTPATIENT
Start: 2023-04-20 | End: 2023-04-21

## 2023-04-20 RX ORDER — SODIUM CHLORIDE 0.9 % (FLUSH) 0.9 %
10 SYRINGE (ML) INJECTION PRN
Status: DISCONTINUED | OUTPATIENT
Start: 2023-04-20 | End: 2023-04-21 | Stop reason: HOSPADM

## 2023-04-20 RX ORDER — ONDANSETRON 4 MG/1
4 TABLET, ORALLY DISINTEGRATING ORAL EVERY 8 HOURS PRN
Status: DISCONTINUED | OUTPATIENT
Start: 2023-04-20 | End: 2023-04-21 | Stop reason: HOSPADM

## 2023-04-20 RX ORDER — SODIUM CHLORIDE 0.9 % (FLUSH) 0.9 %
5-40 SYRINGE (ML) INJECTION EVERY 12 HOURS SCHEDULED
Status: DISCONTINUED | OUTPATIENT
Start: 2023-04-20 | End: 2023-04-21 | Stop reason: HOSPADM

## 2023-04-20 RX ADMIN — POLYETHYLENE GLYCOL 3350 17 G: 17 POWDER, FOR SOLUTION ORAL at 23:32

## 2023-04-20 RX ADMIN — SODIUM CHLORIDE 100 ML: 9 INJECTION, SOLUTION INTRAVENOUS at 21:14

## 2023-04-20 RX ADMIN — SODIUM CHLORIDE: 9 INJECTION, SOLUTION INTRAVENOUS at 23:28

## 2023-04-20 RX ADMIN — ENOXAPARIN SODIUM 30 MG: 100 INJECTION SUBCUTANEOUS at 23:32

## 2023-04-20 RX ADMIN — IOPAMIDOL 75 ML: 755 INJECTION, SOLUTION INTRAVENOUS at 21:13

## 2023-04-20 RX ADMIN — SODIUM CHLORIDE 1000 ML: 9 INJECTION, SOLUTION INTRAVENOUS at 20:22

## 2023-04-20 RX ADMIN — SODIUM CHLORIDE, PRESERVATIVE FREE 10 ML: 5 INJECTION INTRAVENOUS at 21:14

## 2023-04-20 ASSESSMENT — LIFESTYLE VARIABLES
HOW OFTEN DO YOU HAVE A DRINK CONTAINING ALCOHOL: NEVER
HOW MANY STANDARD DRINKS CONTAINING ALCOHOL DO YOU HAVE ON A TYPICAL DAY: PATIENT DOES NOT DRINK

## 2023-04-20 ASSESSMENT — ENCOUNTER SYMPTOMS
NAUSEA: 1
RHINORRHEA: 0
CHEST TIGHTNESS: 0
VOMITING: 0
DIARRHEA: 0
SORE THROAT: 0
SHORTNESS OF BREATH: 0
EYE DISCHARGE: 0
CONSTIPATION: 1
EYE REDNESS: 0
ABDOMINAL PAIN: 1

## 2023-04-20 ASSESSMENT — PAIN DESCRIPTION - ORIENTATION: ORIENTATION: RIGHT

## 2023-04-20 ASSESSMENT — PAIN SCALES - GENERAL
PAINLEVEL_OUTOF10: 10
PAINLEVEL_OUTOF10: 0

## 2023-04-20 ASSESSMENT — PAIN DESCRIPTION - LOCATION: LOCATION: ABDOMEN

## 2023-04-20 ASSESSMENT — PAIN - FUNCTIONAL ASSESSMENT: PAIN_FUNCTIONAL_ASSESSMENT: 0-10

## 2023-04-20 NOTE — ED TRIAGE NOTES
Mode of arrival (squad #, walk in, police, etc) : walk in        Chief complaint(s): abd cramping, constipation        Arrival Note (brief scenario, treatment PTA, etc). : Patient reports epigastric pain x 3 days without BM. Pt reports trying gas x and docusate sodium without relief. C= \"Have you ever felt that you should Cut down on your drinking? \"  No  A= \"Have people Annoyed you by criticizing your drinking? \"  No  G= \"Have you ever felt bad or Guilty about your drinking? \"  No  E= \"Have you ever had a drink as an Eye-opener first thing in the morning to steady your nerves or to help a hangover? \"  No      Deferred []      Reason for deferring: N/A    *If yes to two or more: probable alcohol abuse. *

## 2023-04-21 VITALS
HEIGHT: 70 IN | DIASTOLIC BLOOD PRESSURE: 85 MMHG | TEMPERATURE: 97.7 F | HEART RATE: 57 BPM | BODY MASS INDEX: 32.93 KG/M2 | SYSTOLIC BLOOD PRESSURE: 148 MMHG | OXYGEN SATURATION: 96 % | RESPIRATION RATE: 18 BRPM | WEIGHT: 230 LBS

## 2023-04-21 LAB
ABSOLUTE EOS #: 0.08 K/UL (ref 0–0.4)
ABSOLUTE LYMPH #: 1.81 K/UL (ref 1–4.8)
ABSOLUTE MONO #: 0.33 K/UL (ref 0.1–1.3)
ANION GAP SERPL CALCULATED.3IONS-SCNC: 9 MMOL/L (ref 9–17)
ATYPICAL LYMPHOCYTE ABSOLUTE COUNT: 0.04 K/UL
ATYPICAL LYMPHOCYTES: 1 %
BASOPHILS # BLD: 2 % (ref 0–2)
BASOPHILS ABSOLUTE: 0.08 K/UL (ref 0–0.2)
BILIRUBIN URINE: NEGATIVE
BUN SERPL-MCNC: 9 MG/DL (ref 6–20)
CALCIUM SERPL-MCNC: 8.5 MG/DL (ref 8.6–10.4)
CHLORIDE SERPL-SCNC: 108 MMOL/L (ref 98–107)
CO2 SERPL-SCNC: 25 MMOL/L (ref 20–31)
COLOR: YELLOW
COMMENT UA: NORMAL
CREAT SERPL-MCNC: 1.05 MG/DL (ref 0.7–1.2)
EOSINOPHILS RELATIVE PERCENT: 2 % (ref 0–4)
GFR SERPL CREATININE-BSD FRML MDRD: >60 ML/MIN/1.73M2
GLUCOSE SERPL-MCNC: 100 MG/DL (ref 70–99)
GLUCOSE UR STRIP.AUTO-MCNC: NEGATIVE MG/DL
HCT VFR BLD AUTO: 41.6 % (ref 41–53)
HGB BLD-MCNC: 14.1 G/DL (ref 13.5–17.5)
KETONES UR STRIP.AUTO-MCNC: NEGATIVE MG/DL
LEUKOCYTE ESTERASE UR QL STRIP.AUTO: NEGATIVE
LYMPHOCYTES # BLD: 44 % (ref 24–44)
MCH RBC QN AUTO: 28.7 PG (ref 26–34)
MCHC RBC AUTO-ENTMCNC: 33.8 G/DL (ref 31–37)
MCV RBC AUTO: 85 FL (ref 80–100)
MONOCYTES # BLD: 8 % (ref 1–7)
MORPHOLOGY: ABNORMAL
MORPHOLOGY: ABNORMAL
NITRITE UR QL STRIP.AUTO: NEGATIVE
PDW BLD-RTO: 13.5 % (ref 11.5–14.9)
PLATELET # BLD AUTO: 244 K/UL (ref 150–450)
PMV BLD AUTO: 7 FL (ref 6–12)
POTASSIUM SERPL-SCNC: 3.7 MMOL/L (ref 3.7–5.3)
PROT UR STRIP.AUTO-MCNC: 6.5 MG/DL (ref 5–8)
PROT UR STRIP.AUTO-MCNC: NEGATIVE MG/DL
RBC # BLD: 4.9 M/UL (ref 4.5–5.9)
SEG NEUTROPHILS: 43 % (ref 36–66)
SEGMENTED NEUTROPHILS ABSOLUTE COUNT: 1.76 K/UL (ref 1.3–9.1)
SODIUM SERPL-SCNC: 142 MMOL/L (ref 135–144)
SPECIFIC GRAVITY UA: 1.01 (ref 1–1.03)
TURBIDITY: CLEAR
URINE HGB: NEGATIVE
UROBILINOGEN, URINE: NORMAL
WBC # BLD AUTO: 4.1 K/UL (ref 3.5–11)

## 2023-04-21 PROCEDURE — 36415 COLL VENOUS BLD VENIPUNCTURE: CPT

## 2023-04-21 PROCEDURE — 6370000000 HC RX 637 (ALT 250 FOR IP): Performed by: NURSE PRACTITIONER

## 2023-04-21 PROCEDURE — 6360000002 HC RX W HCPCS: Performed by: NURSE PRACTITIONER

## 2023-04-21 PROCEDURE — 81003 URINALYSIS AUTO W/O SCOPE: CPT

## 2023-04-21 PROCEDURE — 85025 COMPLETE CBC W/AUTO DIFF WBC: CPT

## 2023-04-21 PROCEDURE — 80048 BASIC METABOLIC PNL TOTAL CA: CPT

## 2023-04-21 PROCEDURE — 6370000000 HC RX 637 (ALT 250 FOR IP): Performed by: SURGERY

## 2023-04-21 PROCEDURE — 99223 1ST HOSP IP/OBS HIGH 75: CPT | Performed by: INTERNAL MEDICINE

## 2023-04-21 RX ORDER — POLYETHYLENE GLYCOL 3350 17 G/17G
238 POWDER, FOR SOLUTION ORAL ONCE
Qty: 238 G | Refills: 0 | Status: SHIPPED | OUTPATIENT
Start: 2023-04-21 | End: 2023-04-21

## 2023-04-21 RX ORDER — POLYETHYLENE GLYCOL 3350 17 G/17G
17 POWDER, FOR SOLUTION ORAL 3 TIMES DAILY
Qty: 51 G | Refills: 0 | Status: SHIPPED | OUTPATIENT
Start: 2023-04-21 | End: 2023-04-22

## 2023-04-21 RX ORDER — DOCUSATE SODIUM 100 MG/1
100 CAPSULE, LIQUID FILLED ORAL 2 TIMES DAILY
Qty: 5 CAPSULE | Refills: 0 | Status: SHIPPED | OUTPATIENT
Start: 2023-04-21 | End: 2023-04-24

## 2023-04-21 RX ADMIN — ACETAMINOPHEN 650 MG: 325 TABLET ORAL at 11:36

## 2023-04-21 RX ADMIN — POLYETHYLENE GLYCOL 3350 17 G: 17 POWDER, FOR SOLUTION ORAL at 06:20

## 2023-04-21 RX ADMIN — ENOXAPARIN SODIUM 30 MG: 100 INJECTION SUBCUTANEOUS at 09:58

## 2023-04-21 ASSESSMENT — PAIN DESCRIPTION - LOCATION: LOCATION: ABDOMEN

## 2023-04-21 ASSESSMENT — PAIN DESCRIPTION - ORIENTATION: ORIENTATION: RIGHT

## 2023-04-21 ASSESSMENT — PAIN DESCRIPTION - DESCRIPTORS: DESCRIPTORS: SHARP

## 2023-04-21 ASSESSMENT — PAIN SCALES - GENERAL: PAINLEVEL_OUTOF10: 10

## 2023-04-21 NOTE — PLAN OF CARE
Problem: Discharge Planning  Goal: Discharge to home or other facility with appropriate resources  4/21/2023 0945 by Marya Valadez RN  Outcome: Progressing     Problem: ABCDS Injury Assessment  Goal: Absence of physical injury  Outcome: Progressing

## 2023-04-21 NOTE — PROGRESS NOTES
Surgery On Call    Formal consult to follow in AM.  Discussed case with Dr. Nora Graves. Non-toxic appearing male with abdominal pain with abnormal appearance of ascending colon. I reviewed CT scan and do not see any obstruction, the ascending colon near the hepatic flexure does appear abnormal and the distal colon is decompressed. Patient would likely benefit from colonoscopy soon. Recommend medicine admission. NPO, IVF. Will give miralax tonight and tomm morning. I will discuss with Dr. Logan Bell timing of colonoscopy either inpatient vs outpatient.        Alex Ashley MD

## 2023-04-21 NOTE — ED PROVIDER NOTES
1928 04/20/23 2201 04/20/23 2244   BP: (!) 157/96 (!) 137/92 (!) 149/97   Pulse: 75 77 77   Resp: 18 20 20   Temp: 98.5 °F (36.9 °C)  98 °F (36.7 °C)   TempSrc: Oral  Oral   SpO2: 97% 98% 98%   Weight: 230 lb (104.3 kg)     Height: 5' 10\" (1.778 m)       MEDICATIONS GIVEN TO PATIENT THIS ENCOUNTER:  Orders Placed This Encounter   Medications    0.9 % sodium chloride bolus    0.9 % sodium chloride bolus    iopamidol (ISOVUE-370) 76 % injection 75 mL    sodium chloride flush 0.9 % injection 10 mL    polyethylene glycol (GLYCOLAX) packet 17 g     DISCHARGE PRESCRIPTIONS:  New Prescriptions    No medications on file     PHYSICIAN CONSULTS ORDERED THIS ENCOUNTER:  IP CONSULT TO GENERAL SURGERY  IP CONSULT TO INTERNAL MEDICINE  FINAL IMPRESSION      1. Colonic mass    2. Constipation, unspecified constipation type          DISPOSITION/PLAN   DISPOSITION Admitted 04/20/2023 10:18:57 PM      OUTPATIENT FOLLOW UP THE PATIENT:  No follow-up provider specified.     MD Herbert Santos MD  04/20/23 5045

## 2023-04-21 NOTE — PROGRESS NOTES
Sreekanth Zuñiga is a 52 y.o. Non- / non  male who presents with Abdominal Cramping and Constipation   and is admitted to the hospital for the management of Colonic mass. According to patient, abdominal cramping and constipation for the past 3 to 4 days. Reports that he had a medium sized, soft formed bowel movement 2 to 3 days ago, but states it has been at least 3 days since he had a good bowel movement. Reports that yesterday, he developed intermittent, sharp, right-sided abdominal pain, that was relieved with Gas-X. Reports that today he took some over-the-counter laxative and states that he did have a formed bowel movement in the ED at approximately 9:30 PM tonight. Symptoms are associated with abdominal bloating. States that he was unable to pass any gas yesterday and earlier today. Denies fever, chills, chest pain, cough, nausea, vomiting, diarrhea, and urinary symptoms. Symptoms are aggravated by sudden movements, standing, and lying flat. Symptoms are improved by sitting upright. Symptoms are reported as sharp, intermittent, and fleeting-lasting only for a few moments at a time before resolving. Patient reports that he has never had a colonoscopy; however, he did send in a Cologuard in January to February of this year which resulted as normal.  Reports that his  father had cancer.         Patient status inpatient in the [facility] Problems             Last Modified POA    * (Principal) Colonic mass 2023 Yes     Colonic Mass  -3-day history of abdominal distention, pain, and constipation  -CT abdomen/pelvis -possible mass versus high density fecal debris at proximal ascending colon  --Colonoscopic correlation recommended  ---Findings suggesting cystitis  ---UA pending; denies urinary symptoms  --No other CT evidence of acute abdominal/pelvic process  --Small fat-containing local hernia  --No findings to suggest pancreatitis, calculus, or

## 2023-04-21 NOTE — ED NOTES
Urine bottle and label sent with patient upstairs   Pt  will give urine  sample upstairs   Inform to  receiving RONNIE Tabor RN  04/20/23 1795

## 2023-04-21 NOTE — PROGRESS NOTES
Per Dr. Nikhil Ye no pre- testing is needed prior to patient coming for colonoscopy on Monday 4-24-23.

## 2023-04-21 NOTE — PLAN OF CARE
Problem: Discharge Planning  Goal: Discharge to home or other facility with appropriate resources  Outcome: Progressing  Flowsheets (Taken 4/21/2023 0426)  Discharge to home or other facility with appropriate resources:   Identify barriers to discharge with patient and caregiver   Arrange for needed discharge resources and transportation as appropriate   Identify discharge learning needs (meds, wound care, etc)   Refer to discharge planning if patient needs post-hospital services based on physician order or complex needs related to functional status, cognitive ability or social support system

## 2023-04-21 NOTE — CARE COORDINATION
discharge: N/A            Potential DME: NONE   Patient expects to discharge to: House  Plan for transportation at discharge: Self    Financial    Payor: [facility]ust[facility] Single / Plan: [place] / Product Type: *No Product type* /     Does insurance require precert for SNF: Yes    Potential assistance Purchasing Medications: No  Meds-to-Beds request: Yes      [facility] [id number] [place] [address] Extension - F [phone]  [address] [place] [address]  Phone: [phone] Fax: [phone]      Notes:    Factors facilitating achievement of predicted outcomes: Family support, Motivated, Cooperative, and Pleasant    Barriers to discharge: NO BARRIERS IDENTIFIED    Additional Case Management Notes: 4/21/2023 [facility] Medicare; pt independent from home with sister, works full time as a [employer]; DME CPAP ([address]); VNS denies need    The Plan for Transition of Care is related to the following treatment goals of Colonic mass [K63.89]  Constipation, unspecified constipation type [P44.29]    IF APPLICABLE: The Patient and/or patient representative Aamir Mcfarland and his family were provided with a choice of provider and agrees with the discharge plan. Freedom of choice list with basic dialogue that supports the patient's individualized plan of care/goals and shares the quality data associated with the providers was provided to: Patient       The Patient and/or Patient Representative Agree with the Discharge Plan?  Yes    Edgar Bains RN  Case Management Department  Ph: [phone] Fax: [phone]

## 2023-04-21 NOTE — PLAN OF CARE
Problem: Discharge Planning  Goal: Discharge to home or other facility with appropriate resources  4/21/2023 1542 by Naeem Alcala RN  Outcome: Completed     Problem: ABCDS Injury Assessment  Goal: Absence of physical injury  4/21/2023 1542 by Naeem Alcala RN  Outcome: Completed

## 2023-04-21 NOTE — DISCHARGE INSTRUCTIONS
no more than a few drops of blood, and the  bleeding should stop within 24 hours after your procedure. After a colonoscopy, its normal for your bowel movements to be irregular or different from your usual habits. This may last for up to a week after your  procedure. When to Call Noa  Provider    Call your healthcare provider if you have any of the following:  A fever of 101° F (38.3° C) or higher  Serious stomach pain or bloating  Bleeding from your rectum that lasts more than 24 hours  Bleeding between bowel movements  Heavy bleeding from your rectum    If you have any questions, contact a member of your healthcare team  directly. This information was accessed and revised using recommendations from [employer]: All rights owned and reserved by [employer].    Accessed 8/11/2022. [email or web address]

## 2023-04-21 NOTE — H&P
0.04 k/uL    Absolute Mono # 0.33 0.1 - 1.3 k/uL    Absolute Eos # 0.08 0.0 - 0.4 k/uL    Basophils Absolute 0.08 0.0 - 0.2 k/uL    Morphology ANISOCYTOSIS PRESENT     Morphology 1+ TEARDROPS    Urinalysis with Reflex to Culture    Collection Time: 04/21/23  7:19 AM    Specimen: Urine, clean catch   Result Value Ref Range    Color, UA Yellow Yellow    Turbidity UA Clear Clear    Glucose, Ur NEGATIVE NEGATIVE    Bilirubin Urine NEGATIVE NEGATIVE    Ketones, Urine NEGATIVE NEGATIVE    Specific [place], UA 1.013 1.000 - 1.030    Urine Hgb NEGATIVE NEGATIVE    pH, UA 6.5 5.0 - 8.0    Protein, UA NEGATIVE NEGATIVE    Urobilinogen, Urine Normal Normal    Nitrite, Urine NEGATIVE NEGATIVE    Leukocyte Esterase, Urine NEGATIVE NEGATIVE    Urinalysis Comments       Microscopic exam not performed based on chemical results unless requested in original order. Imaging/Diagnostics:  CT ABDOMEN PELVIS W IV CONTRAST Additional Contrast? None    Result Date: 4/20/2023  1. Possible mass versus high density fecal debris at the proximal ascending colon. Colonoscopic correlation recommend 2. Findings suggesting cystitis; correlate with urinalysis. 3.  No other CT evidence of an acute intra-abdominal or intrapelvic process. 4. Small, fat containing umbilical hernia. 5.  No findings to suggest acute appendicitis; no ureter calculus or hydronephrosis. RECOMMENDATIONS: Prompt, nonemergent colonoscopic evaluation for proximal colon mass.        Assessment :      Hospital Problems             Last Modified POA    * (Principal) Colonic mass 4/20/2023 Yes    Stage 2 chronic kidney disease 4/21/2023 Yes       Plan:     Patient status inpatient in the Med/Surge    1.  53 gentleman admitted with abdominal pain and colonic mass, general surgery on board, does not look obstructed at this time, pain is under control, possible colonoscopy soon may be as outpatient,  Hypertension, blood pressure running well at this time,  Acute kidney injury, due

## 2023-04-21 NOTE — CONSULTS
General Surgery -- Consult    PATIENT NAME: Elda Bullock  AGE: 52 y.o. MEDICAL RECORD NO. [id number]  DATE: 4/21/2023  PRIMARY CARE PHYSICIAN: Deshaun Navarro MD  SURGEON: [address]    Reason for evaluation: Concern for colonic mass    Patient information was obtained from patient. IMPRESSION:     Constipation with concern for colonic mass on imaging  Family history of colon cancer      PLAN:     Ok to start regular diet  Had long discussion with patient at beside. No obstructive symptoms. Will plan for discharge today and colonoscopy to be performed on Monday 4/24/23 at 1:30 PM.   We discussed in detail the bowel prep instructions. We discussed the risks versus benefits of colonoscopy which include but are not limited to infection, bleeding, perforation requiring surgical resection, anesthesia, missing small polyps. The patient verbalized understanding and all questions were addressed. I discussed genetic testing outpatient due to father diagnosis of colon cancer at 52. If colonoscopy positive for colonic mass, will proceed with genetic testing. Okay for discharge from a surgery perspective. HISTORY:     HPI: Elda Bullock is a/an 52 y.o. male who presents the emergency department yesterday with concerns for right lower quadrant pain and constipation. He states he had gone 2 to 3 days without having a bowel movement. He denies nausea and vomiting. He stated the pain felt like cramping. On work-up in the emergency department he was found to have concerns for colonic mass on CT scan. General surgery consulted. On exam, patient is  in the right lower quadrant without guarding. He denies rectal bleeding or melena. He denies weight loss. He states his abdominal pain has improved. He has had several bowel movements overnight after MiraLAX. States he is hungry.       Past Medical History   has a past medical history of Bipolar 1 disorder ([facility] [address] Depression, Hypertension, and

## 2023-04-21 NOTE — ED NOTES
Report given to Ciara Valadez RN from [facility] . Report method by phone   The following was reviewed with receiving RN:   Current vital signs:  BP (!) 149/97   Pulse 77   Temp 98 °F (36.7 °C) (Oral)   Resp 20   Ht 5' 10\" (1.778 m)   Wt 230 lb (104.3 kg)   SpO2 98%   BMI 33.00 kg/m²                MEWS Score: 1     Any medication or safety alerts were reviewed. Any pending diagnostics and notifications were also reviewed, as well as any safety concerns or issues, abnormal labs, abnormal imaging, and abnormal assessment findings. Questions were answered.          Epi Brandon RN  04/20/23 0383

## 2023-04-24 ENCOUNTER — ANESTHESIA EVENT (OUTPATIENT)
Dept: ENDOSCOPY | Age: 53
End: 2023-04-24
Payer: MEDICARE

## 2023-04-24 ENCOUNTER — CARE COORDINATION (OUTPATIENT)
Dept: CASE MANAGEMENT | Age: 53
End: 2023-04-24

## 2023-04-24 ENCOUNTER — HOSPITAL ENCOUNTER (OUTPATIENT)
Age: 53
Setting detail: OUTPATIENT SURGERY
Discharge: HOME | End: 2023-04-24
Attending: STUDENT IN AN ORGANIZED HEALTH CARE EDUCATION/TRAINING PROGRAM | Admitting: STUDENT IN AN ORGANIZED HEALTH CARE EDUCATION/TRAINING PROGRAM
Payer: MEDICARE

## 2023-04-24 ENCOUNTER — ANESTHESIA (OUTPATIENT)
Dept: ENDOSCOPY | Age: 53
End: 2023-04-24
Payer: MEDICARE

## 2023-04-24 ENCOUNTER — APPOINTMENT (OUTPATIENT)
Dept: CT IMAGING | Age: 53
End: 2023-04-24
Attending: STUDENT IN AN ORGANIZED HEALTH CARE EDUCATION/TRAINING PROGRAM
Payer: MEDICARE

## 2023-04-24 VITALS
HEIGHT: 70 IN | RESPIRATION RATE: 23 BRPM | WEIGHT: 230 LBS | BODY MASS INDEX: 32.93 KG/M2 | SYSTOLIC BLOOD PRESSURE: 133 MMHG | TEMPERATURE: 97.7 F | HEART RATE: 85 BPM | OXYGEN SATURATION: 95 % | DIASTOLIC BLOOD PRESSURE: 96 MMHG

## 2023-04-24 DIAGNOSIS — K63.89 COLONIC MASS: ICD-10-CM

## 2023-04-24 PROCEDURE — 74177 CT ABD & PELVIS W/CONTRAST: CPT

## 2023-04-24 PROCEDURE — 2709999900 HC NON-CHARGEABLE SUPPLY: Performed by: STUDENT IN AN ORGANIZED HEALTH CARE EDUCATION/TRAINING PROGRAM

## 2023-04-24 PROCEDURE — 6360000004 HC RX CONTRAST MEDICATION: Performed by: STUDENT IN AN ORGANIZED HEALTH CARE EDUCATION/TRAINING PROGRAM

## 2023-04-24 PROCEDURE — 2580000003 HC RX 258: Performed by: STUDENT IN AN ORGANIZED HEALTH CARE EDUCATION/TRAINING PROGRAM

## 2023-04-24 PROCEDURE — 7100000010 HC PHASE II RECOVERY - FIRST 15 MIN: Performed by: STUDENT IN AN ORGANIZED HEALTH CARE EDUCATION/TRAINING PROGRAM

## 2023-04-24 PROCEDURE — 7100000011 HC PHASE II RECOVERY - ADDTL 15 MIN: Performed by: STUDENT IN AN ORGANIZED HEALTH CARE EDUCATION/TRAINING PROGRAM

## 2023-04-24 PROCEDURE — C1889 IMPLANT/INSERT DEVICE, NOC: HCPCS | Performed by: STUDENT IN AN ORGANIZED HEALTH CARE EDUCATION/TRAINING PROGRAM

## 2023-04-24 PROCEDURE — 2580000003 HC RX 258: Performed by: ANESTHESIOLOGY

## 2023-04-24 PROCEDURE — 3700000001 HC ADD 15 MINUTES (ANESTHESIA): Performed by: STUDENT IN AN ORGANIZED HEALTH CARE EDUCATION/TRAINING PROGRAM

## 2023-04-24 PROCEDURE — 3700000000 HC ANESTHESIA ATTENDED CARE: Performed by: STUDENT IN AN ORGANIZED HEALTH CARE EDUCATION/TRAINING PROGRAM

## 2023-04-24 PROCEDURE — 88305 TISSUE EXAM BY PATHOLOGIST: CPT

## 2023-04-24 PROCEDURE — 2500000003 HC RX 250 WO HCPCS: Performed by: ANESTHESIOLOGY

## 2023-04-24 PROCEDURE — 3609010600 HC COLONOSCOPY POLYPECTOMY SNARE/COLD BIOPSY: Performed by: STUDENT IN AN ORGANIZED HEALTH CARE EDUCATION/TRAINING PROGRAM

## 2023-04-24 PROCEDURE — 2500000003 HC RX 250 WO HCPCS: Performed by: NURSE ANESTHETIST, CERTIFIED REGISTERED

## 2023-04-24 PROCEDURE — 45385 COLONOSCOPY W/LESION REMOVAL: CPT | Performed by: STUDENT IN AN ORGANIZED HEALTH CARE EDUCATION/TRAINING PROGRAM

## 2023-04-24 PROCEDURE — 6360000002 HC RX W HCPCS: Performed by: NURSE ANESTHETIST, CERTIFIED REGISTERED

## 2023-04-24 RX ORDER — FENTANYL CITRATE 0.05 MG/ML
25 INJECTION, SOLUTION INTRAMUSCULAR; INTRAVENOUS EVERY 5 MIN PRN
Status: DISCONTINUED | OUTPATIENT
Start: 2023-04-24 | End: 2023-04-24 | Stop reason: HOSPADM

## 2023-04-24 RX ORDER — SODIUM CHLORIDE 0.9 % (FLUSH) 0.9 %
10 SYRINGE (ML) INJECTION PRN
Status: DISCONTINUED | OUTPATIENT
Start: 2023-04-24 | End: 2023-04-24 | Stop reason: HOSPADM

## 2023-04-24 RX ORDER — PROPOFOL 10 MG/ML
INJECTION, EMULSION INTRAVENOUS CONTINUOUS PRN
Status: DISCONTINUED | OUTPATIENT
Start: 2023-04-24 | End: 2023-04-24 | Stop reason: SDUPTHER

## 2023-04-24 RX ORDER — SODIUM CHLORIDE, SODIUM LACTATE, POTASSIUM CHLORIDE, CALCIUM CHLORIDE 600; 310; 30; 20 MG/100ML; MG/100ML; MG/100ML; MG/100ML
INJECTION, SOLUTION INTRAVENOUS CONTINUOUS
Status: DISCONTINUED | OUTPATIENT
Start: 2023-04-24 | End: 2023-04-24 | Stop reason: HOSPADM

## 2023-04-24 RX ORDER — LIDOCAINE HYDROCHLORIDE 10 MG/ML
1 INJECTION, SOLUTION EPIDURAL; INFILTRATION; INTRACAUDAL; PERINEURAL
Status: COMPLETED | OUTPATIENT
Start: 2023-04-24 | End: 2023-04-24

## 2023-04-24 RX ORDER — LABETALOL HYDROCHLORIDE 5 MG/ML
10 INJECTION, SOLUTION INTRAVENOUS
Status: DISCONTINUED | OUTPATIENT
Start: 2023-04-24 | End: 2023-04-24 | Stop reason: HOSPADM

## 2023-04-24 RX ORDER — DIPHENHYDRAMINE HYDROCHLORIDE 50 MG/ML
12.5 INJECTION INTRAMUSCULAR; INTRAVENOUS
Status: DISCONTINUED | OUTPATIENT
Start: 2023-04-24 | End: 2023-04-24 | Stop reason: HOSPADM

## 2023-04-24 RX ORDER — METOCLOPRAMIDE HYDROCHLORIDE 5 MG/ML
10 INJECTION INTRAMUSCULAR; INTRAVENOUS
Status: DISCONTINUED | OUTPATIENT
Start: 2023-04-24 | End: 2023-04-24 | Stop reason: HOSPADM

## 2023-04-24 RX ORDER — SODIUM CHLORIDE 9 MG/ML
INJECTION, SOLUTION INTRAVENOUS PRN
Status: DISCONTINUED | OUTPATIENT
Start: 2023-04-24 | End: 2023-04-24 | Stop reason: HOSPADM

## 2023-04-24 RX ORDER — SODIUM CHLORIDE 0.9 % (FLUSH) 0.9 %
5-40 SYRINGE (ML) INJECTION EVERY 12 HOURS SCHEDULED
Status: DISCONTINUED | OUTPATIENT
Start: 2023-04-24 | End: 2023-04-24 | Stop reason: HOSPADM

## 2023-04-24 RX ORDER — 0.9 % SODIUM CHLORIDE 0.9 %
100 INTRAVENOUS SOLUTION INTRAVENOUS ONCE
Status: COMPLETED | OUTPATIENT
Start: 2023-04-24 | End: 2023-04-24

## 2023-04-24 RX ORDER — SODIUM CHLORIDE 0.9 % (FLUSH) 0.9 %
5-40 SYRINGE (ML) INJECTION PRN
Status: DISCONTINUED | OUTPATIENT
Start: 2023-04-24 | End: 2023-04-24 | Stop reason: HOSPADM

## 2023-04-24 RX ORDER — LIDOCAINE HYDROCHLORIDE 20 MG/ML
INJECTION, SOLUTION EPIDURAL; INFILTRATION; INTRACAUDAL; PERINEURAL PRN
Status: DISCONTINUED | OUTPATIENT
Start: 2023-04-24 | End: 2023-04-24 | Stop reason: SDUPTHER

## 2023-04-24 RX ORDER — MEPERIDINE HYDROCHLORIDE 25 MG/ML
12.5 INJECTION INTRAMUSCULAR; INTRAVENOUS; SUBCUTANEOUS EVERY 5 MIN PRN
Status: DISCONTINUED | OUTPATIENT
Start: 2023-04-24 | End: 2023-04-24 | Stop reason: HOSPADM

## 2023-04-24 RX ORDER — HYDRALAZINE HYDROCHLORIDE 20 MG/ML
10 INJECTION INTRAMUSCULAR; INTRAVENOUS
Status: DISCONTINUED | OUTPATIENT
Start: 2023-04-24 | End: 2023-04-24 | Stop reason: HOSPADM

## 2023-04-24 RX ORDER — ONDANSETRON 2 MG/ML
4 INJECTION INTRAMUSCULAR; INTRAVENOUS
Status: DISCONTINUED | OUTPATIENT
Start: 2023-04-24 | End: 2023-04-24 | Stop reason: HOSPADM

## 2023-04-24 RX ORDER — ACETAMINOPHEN 325 MG/1
650 TABLET ORAL
Status: DISCONTINUED | OUTPATIENT
Start: 2023-04-24 | End: 2023-04-24 | Stop reason: HOSPADM

## 2023-04-24 RX ADMIN — PROPOFOL 100 MCG/KG/MIN: 10 INJECTION, EMULSION INTRAVENOUS at 13:00

## 2023-04-24 RX ADMIN — LIDOCAINE HYDROCHLORIDE 100 MG: 20 INJECTION, SOLUTION EPIDURAL; INFILTRATION; INTRACAUDAL; PERINEURAL at 13:00

## 2023-04-24 RX ADMIN — LIDOCAINE HYDROCHLORIDE 1 ML: 10 INJECTION, SOLUTION EPIDURAL; INFILTRATION; INTRACAUDAL; PERINEURAL at 11:48

## 2023-04-24 RX ADMIN — IOPAMIDOL 75 ML: 755 INJECTION, SOLUTION INTRAVENOUS at 15:38

## 2023-04-24 RX ADMIN — SODIUM CHLORIDE, POTASSIUM CHLORIDE, SODIUM LACTATE AND CALCIUM CHLORIDE: 600; 310; 30; 20 INJECTION, SOLUTION INTRAVENOUS at 11:49

## 2023-04-24 RX ADMIN — SODIUM CHLORIDE 100 ML: 9 INJECTION, SOLUTION INTRAVENOUS at 15:39

## 2023-04-24 RX ADMIN — SODIUM CHLORIDE, PRESERVATIVE FREE 10 ML: 5 INJECTION INTRAVENOUS at 15:38

## 2023-04-24 ASSESSMENT — PAIN - FUNCTIONAL ASSESSMENT: PAIN_FUNCTIONAL_ASSESSMENT: 0-10

## 2023-04-24 ASSESSMENT — ENCOUNTER SYMPTOMS
WHEEZING: 0
COUGH: 0
SHORTNESS OF BREATH: 0
STRIDOR: 0
SORE THROAT: 0
BACK PAIN: 0
SHORTNESS OF BREATH: 0

## 2023-04-24 ASSESSMENT — PAIN SCALES - GENERAL: PAINLEVEL_OUTOF10: 0

## 2023-04-24 ASSESSMENT — LIFESTYLE VARIABLES: SMOKING_STATUS: 0

## 2023-04-24 NOTE — ANESTHESIA PRE PROCEDURE
Department of Anesthesiology  Preprocedure Note       Name:  Reyna Section   Age:  52 y.o.  :  1970                                          MRN:  [id number]         Date:  2023      Surgeon: Jaydon Yin):  Yas Segal DO    Procedure: Procedure(s):  COLONOSCOPY DIAGNOSTIC    Medications prior to admission:   Prior to Admission medications    Medication Sig Start Date End Date Taking? Authorizing Provider   docusate sodium (COLACE) 100 MG capsule Take 1 capsule by mouth 2 times daily for 5 doses 23  Candace Segal DO   amLODIPine (NORVASC) 10 MG tablet TAKE 1 TABLET BY MOUTH EVERY DAY 23   Mae Marquez MD   tamsulosin (FLOMAX) 0.4 MG capsule TAKE 1 CAPSULE BY MOUTH EVERY DAY 23   Mae Marquez MD   sildenafil (VIAGRA) 100 MG tablet TAKE 1 TABLET BY MOUTH one time a day as NEEDED FOR ERECTILE DYSFUNCTION 23   Mae Marquez MD   lamoTRIgine (LAMICTAL) 100 MG tablet TAKE 1 TABLET BY MOUTH IN THE MORNING 7/3/22   Historical Provider, MD   amphetamine-dextroamphetamine (ADDERALL) 20 MG tablet Take 1 tablet by mouth 2 times daily. 22   Historical Provider, MD   hydroCHLOROthiazide (HYDRODIURIL) 25 MG tablet Take 1 tablet by mouth every morning 22   Mae Marquez MD   valACYclovir (VALTREX) 500 MG tablet TAKE 1 TABLET BY MOUTH ONE TIME A DAY.  start within 72 hours of symptom onset 11/15/21   Mae Marquez MD   EPINEPHrine (EPIPEN 2-LAURA) 0.3 MG/0.3ML SOAJ injection Inject 0.3 mLs into the muscle once as needed (severe allergic reaction) 19  Mayi Galeas MD   sertraline (ZOLOFT) 50 MG tablet Take 1 tablet by mouth daily Indications: zeph 1/10/19   HARRY Mitchell - CNP       Current medications:    Current Facility-Administered Medications   Medication Dose Route Frequency Provider Last Rate Last Admin    lactated ringers IV soln infusion   IntraVENous Continuous Lor Rodriguez  mL/hr at 23 1149 New Bag at 23 1149    sodium

## 2023-04-24 NOTE — CARE COORDINATION
[facility] Care Transitions Initial Follow Up Call    Call within 2 business days of discharge: Yes    Patient Current Location: [facility]        Patient: Inna Mcknight Patient : 1970   MRN: [id number]  Reason for Admission: COLONIC MASS  Discharge Date: 23 RARS: Readmission Risk Score: 7.4      Last Discharge  [address]       Date Complaint Diagnosis Description Type Department Provider    23   Admission (Current) ANASTASIA Segal DO              24 HR initial call. Pt is currently at [address] [place] [address] for [address]    will reattempt tomorrow.      Non-face-to-face services provided:  Obtained and reviewed discharge summary and/or continuity of care documents        Care Transitions 24 Hour Call    Care Transitions Interventions           Follow Up  Future Appointments   Date Time Provider Shruti Moore   2023 11:45 AM Guadalupe Dixon MD fp ELYSE Metzger LPN Care Transitions Nurse   [phone]

## 2023-04-24 NOTE — H&P
HISTORY and [facility] JESSICA Cesar [address]       NAME:  Harriet Baum  MRN: [id number]   YOB: 1970   Date: 4/24/2023   Age: 52 y.o. Gender: male       COMPLAINT AND PRESENT HISTORY:     Harriet Baum is 52 y.o. male, having a diagnostic colonoscopy. Pt has not had previous colonoscopy. Pt admitted to [facility] 04/20/2023 for evaluation of abdominal pain and constipation. CT abdomen/pelvis completed in ED 04/20/2023 - results below. Below italics a portion of consultation note by Dr. Krista Webster dated 04/20/2023: Reviewed  HPI: Harriet Baum is a/an 52 y.o. male who presents the emergency department yesterday with concerns for right lower quadrant pain and constipation. He states he had gone 2 to 3 days without having a bowel movement. He denies nausea and vomiting. He stated the pain felt like cramping. On work-up in the emergency department he was found to have concerns for colonic mass on CT scan. General surgery consulted. On exam, patient is  in the right lower quadrant without guarding. He denies rectal bleeding or melena. He denies weight loss. He states his abdominal pain has improved. He has had several bowel movements overnight after MiraLAX. States he is hungry. UPDATE: Pt continues to have RUQ abdominal pain that initially began about one week ago. Pain is intermittent. Worse with sudden movements or with lying on right side. Denies heartburn, dysphagia. Pt has constipation. Denies nausea, vomiting, diarrhea, hematochezia and melena. Denies changes in appetite and unintended weight loss. Pt has Family Hx of colon cancer in his father and paternal aunt. Completed and followed prescribed prep. NPO p MN. Took amlodipine and flomax this am with sip of water. Denies taking anticoagulants or blood thinning medications. Denies recent or current chest pain/pressure, palpitations, SOB, recent URI, fever or chills.      RECENT

## 2023-04-24 NOTE — OP NOTE
PROCEDURE NOTE    Patient: Monty Hoffman  MRN: [id number]  Date of Procedure: 4/24/2023    Preoperative Diagnosis: Concern for colon mass on CT scan, family history of colon cancer    Post Operative Diagnosis: Transverse colon polyp, sigmoid colon polyp    Procedure: Diagnostic Colonoscopy, transverse colonic polypectomy (hot snare), sigmoid colon polypectomy (hot snare)    Surgeon: Risa Segal DO    Assistant: None    Anesthesia: MAC     Estimated Blood Loss: 3 cc    Complications: None     Specimens: were obtained    ID Type Source Tests Collected by Time Destination   A : HEPATIC FLEXURE AT 75 Tissue Hepatic Flexure SURGICAL PATHOLOGY Candace Segal, DO 4/24/2023 1418    B : SIGMOID COLON POLYP AT 50 Tissue Sigmoid Colon SURGICAL PATHOLOGY Candace Segal, DO 4/24/2023 1418    C : COLON POLYP AT 20 Tissue Colon SURGICAL PATHOLOGY Parmjitforrest Franky Segal, DO 4/24/2023 1423        Retraction Time: 25 minutes    Prep: Good    History: Monty Hoffman is a/an 52y.o. years old male who presented for a diagnostic colonoscopy due to CT abdomen and pelvis concerning for a colonic mass in the right colon. Patient also has a family history of father and paternal aunt who had colon cancer as well. A colonoscopy with possible biopsy or polypectomy has been recommended. Pre-operative discussion of risks, benefits, expected outcome, and alternatives to the procedure was performed. Risks include but are not limited to bleeding, infection, respiratory distress, hypotension, missed polyps/masses, and perforation of the colon. Monty Hoffman verbalized understanding of the risks and agreed to proceed with the procedure. Procedure: The patient was brought to procedure room and placed in the left lateral decubitus position.  The patient was placed on a pulse oximetry, telemetry, supplemental oxygen, and monitor anesthesia care was provided by the anesthesia team. A formal timeout identifying the patient, procedure, and

## 2023-04-24 NOTE — DISCHARGE INSTRUCTIONS
laxatives, and/or enemas often cause discomfort. During and following the procedure, there is little or no pain. You may feel pressure, bloating, and/or cramping because of the air passed into the colon. This discomfort will go away with the passing of gas. Your doctor may prescribe pain medicine. If not, you can take non-prescription pain relievers for discomfort. Post-procedure Care   At the [facility]    The polyps will be sent to a lab for testing. At Home    Expect a complete recovery within two weeks. To ensure a smooth recovery, be sure to follow your doctor's instructions , which may include: The sedative will make you drowsy. Do not drive, operate machinery, or make important decisions the day of the procedure. Return to your normal diet the same or next day. Avoid tea, coffee, cola drinks, alcohol, and spicy foods for at least 2-3 days following surgery. These can irritate the digestive system. To speed healing, resume normal activities as soon as you feel able. Most people feel well enough by the next day. Ask your doctor when you can participate in any rigorous exercise. Ask your doctor about when it is safe to shower, bathe, or soak in water. You will be scheduled for a follow-up colonoscopy in the future. It will be important to check for recurrence of polyps. Your doctor will discuss the results with you either the day of surgery or the following day.    Call Your Doctor   After arriving home, contact your doctor if any of the following occurs:   Signs of infection, including fever and chills   Redness, swelling, increasing pain, excessive bleeding, or discharge from the rectum (Up to cup of blood per day can be expected for up to 3-4 days following your polypectomy.)   Black, tarry stools   Severe abdominal pain   Hard, swollen abdomen   Inability to pass gas or stool   Cough , shortness of breath, chest pain, or severe nausea or vomiting   New, unexplained symptoms   In case of

## 2023-04-24 NOTE — ANESTHESIA POSTPROCEDURE EVALUATION
POST- ANESTHESIA EVALUATION       Pt Name: Juan Carlos Parrish  MRN: [id number]  [place]: 1970  Date of evaluation: 4/24/2023  Time:  4:14 PM      BP (!) 133/96   Pulse 85   Temp 97.7 °F (36.5 °C)   Resp 23   Ht 5' 10\" (1.778 m)   Wt 230 lb (104.3 kg)   SpO2 95%   BMI 33.00 kg/m²      Consciousness Level  Awake  Cardiopulmonary Status  Stable  Pain Adequately Treated YES  Nausea / Vomiting  NO  Adequate Hydration  YES  Anesthesia Related Complications NONE      Electronically signed by Richard Wiggins MD on 4/24/2023 at [facility]       Department of Anesthesiology  Postprocedure Note    Patient: Juan Carlos Parrish  MRN: [id number]  [place]: 1970  Date of evaluation: 4/24/2023      Procedure Summary     Date: 04/24/23 Room / Location: [address] [place] [address] 02 / [address] [facility] ENDO    Anesthesia Start: 1257 Anesthesia Stop: 8234    Procedure: COLONOSCOPY SNARE POLYPECTOMY HOT BIOPSY WITH CLIP APPLICATION (Rectum) Diagnosis:       Colonic mass      (Colonic mass [K63.89])      (PAT FROM RECENT ADMIT)    Surgeons: Izabela West DO Responsible Provider: Richard Wiggins MD    Anesthesia Type: General ASA Status: 2          Anesthesia Type: General    Juanis Phase I:      Juanis Phase II: Juanis Score: 10      Anesthesia Post Evaluation

## 2023-04-25 ENCOUNTER — CARE COORDINATION (OUTPATIENT)
Dept: CASE MANAGEMENT | Age: 53
End: 2023-04-25

## 2023-04-25 ENCOUNTER — TELEPHONE (OUTPATIENT)
Dept: FAMILY MEDICINE CLINIC | Age: 53
End: 2023-04-25

## 2023-04-25 DIAGNOSIS — K63.89 COLONIC MASS: Primary | ICD-10-CM

## 2023-04-25 NOTE — CARE COORDINATION
Subsequent and Final Calls  Care Transitions Interventions  Other Interventions:             Care Transition Nurse provided contact information for future needs. Plan for follow-up call in 7-10 days based on severity of symptoms and risk factors. Plan for next call: symptom management-bowels moving regularly? PCP appt scheduled?     Dima Borja RN

## 2023-04-25 NOTE — CARE COORDINATION
Care Transitions Outreach Attempt #2    Call within 2 business days of discharge: Yes   Attempt #2 to reach patient for transitions of care follow up. Unable to reach patient. Voicemail left requesting call back. CTN sign off if no return call received. Patient: Manual Null Patient : 1970 MRN: [id number]    Last Discharge [address]       Date Complaint Diagnosis Description Type Department Provider    23  Colonic mass Admission (Discharged) ANASTASIA Pretty Leader, DO              Was this an external facility discharge?  No Discharge Facility: [facility]    Noted following upcoming appointments from discharge chart review:   [facility] follow up appointment(s):   Future Appointments   Date Time Provider Shruti Moore   2023 11:45 AM Justyn Rose MD fp sc Aileen Byrd, RN BSN   Care Transitions Nurse  [phone]

## 2023-04-25 NOTE — TELEPHONE ENCOUNTER
Attempted to schedule patient with PCP/PROVIDER. FOR A ED FOLLOW UP APPOINTMENT. Due to providers schedule availability , no appointments are available to schedule within the time requested with provider. Please advise, where I can help accommodate patient with double booking an appointment slot? Please advise what day/time will work best for providers schedule to be double booked or placed in a 15 min slot. If unable to double book or place in a 15 min slot. Please advise where I can schedule patient. Reason for appointment    4/24/2023 (4 hours)  [facility]: HANDY REGAN  Patient was discharged from [facility] on 04/25/2023    Patient is okay to be schedule with different provider yes - PT HAS BEEN ADDED ON  A WAITLIST    Thank you for all you do!          Future Appointments   Date Time Provider Shruti Moore   7/13/2023 11:45 AM Anjelica Garcia MD [facility]

## 2023-04-26 ENCOUNTER — OFFICE VISIT (OUTPATIENT)
Dept: FAMILY MEDICINE CLINIC | Age: 53
End: 2023-04-26

## 2023-04-26 VITALS
SYSTOLIC BLOOD PRESSURE: 138 MMHG | HEIGHT: 70 IN | TEMPERATURE: 97.9 F | DIASTOLIC BLOOD PRESSURE: 85 MMHG | BODY MASS INDEX: 33.58 KG/M2 | HEART RATE: 83 BPM | WEIGHT: 234.6 LBS | OXYGEN SATURATION: 98 %

## 2023-04-26 DIAGNOSIS — I10 ESSENTIAL HYPERTENSION: ICD-10-CM

## 2023-04-26 DIAGNOSIS — K63.5 POLYP OF SIGMOID COLON, UNSPECIFIED TYPE: ICD-10-CM

## 2023-04-26 DIAGNOSIS — R73.03 PREDIABETES: ICD-10-CM

## 2023-04-26 DIAGNOSIS — Z09 HOSPITAL DISCHARGE FOLLOW-UP: ICD-10-CM

## 2023-04-26 DIAGNOSIS — R10.11 RIGHT UPPER QUADRANT ABDOMINAL PAIN: ICD-10-CM

## 2023-04-26 DIAGNOSIS — K63.89 MASS OF COLON: Primary | ICD-10-CM

## 2023-04-26 LAB
HBA1C MFR BLD: 5.5 %
SURGICAL PATHOLOGY REPORT: NORMAL

## 2023-04-26 RX ORDER — PANTOPRAZOLE SODIUM 40 MG/1
40 TABLET, DELAYED RELEASE ORAL DAILY
Qty: 30 TABLET | Refills: 0 | Status: SHIPPED | OUTPATIENT
Start: 2023-04-26

## 2023-04-26 ASSESSMENT — ENCOUNTER SYMPTOMS
BACK PAIN: 0
ABDOMINAL DISTENTION: 0
BLOOD IN STOOL: 0
TROUBLE SWALLOWING: 0
SHORTNESS OF BREATH: 0
ABDOMINAL PAIN: 1
SINUS PRESSURE: 0
RECTAL PAIN: 0
NAUSEA: 0
COUGH: 0
COLOR CHANGE: 0
STRIDOR: 0
WHEEZING: 0
CONSTIPATION: 0
SORE THROAT: 0
VOMITING: 0
CHEST TIGHTNESS: 0
EYE REDNESS: 0
RHINORRHEA: 0
DIARRHEA: 0

## 2023-04-26 NOTE — PATIENT INSTRUCTIONS
Thank you for choosing [facility]) as your healthcare provider as your care is important to us. Please arrive at your appointment on time. If you are unable to make your appointment, please call our office as soon as possible so that we may reschedule your appointment. Missing 3 appointments in a calendar year without notifying the office may lead to dismissal from the practice. We appreciate you calling at least 24 hours in advance, when possible. Thank you. New Updates for [facility]    Thank you for choosing US to give you the best care! [facility]) is always trying to think of new ways to help their patients. We are asking all patients to try out the new digital registration that is now available through your [facility] account Via the anya you're now able to update your personal and registration information prior to your upcoming appointment. This will save you time once you arrive at the office to check-in, not to mention your information remains safe!! Many other perks come from signing up for an account, such as:  Requesting refills  Scheduling an appointment  Completing an E-Visit  Sending a message to the office/provider  Having access to your medication list  Paying your bill/copay prior to your appointment  Scheduling your yearly mammogram  Review your test results    If you are not familiar with [facility] please ask one of us and we will be happy to answer any questions or help you set-up your account.       Your [employer],

## 2023-04-26 NOTE — PROGRESS NOTES
Post-Discharge Transitional Care  Follow Up      [facility]   YOB: 1970    Date of Office Visit:  4/26/2023  Date of Hospital Admission: 4/24/23  Date of Hospital Discharge: 4/24/23  Risk of hospital readmission (high >=14%. Medium >=10%) :Readmission Risk Score: 7.4      Care management risk score Rising risk (score 2-5) and Complex Care (Scores >=6): No Risk Score On File     Non face to face  following discharge, date last encounter closed (first attempt may have been earlier): 04/25/2023    Call initiated 2 business days of discharge: Yes    ASSESSMENT/PLAN:   Mass of colon  Status post colonoscopy which showed polyp. Repeat CT normal  Right upper quadrant abdominal pain  Acute pain still ongoing, trial of Protonix. -     pantoprazole (PROTONIX) 40 MG tablet; Take 1 tablet by mouth daily, Disp-30 tablet, R-0Normal  Polyp of sigmoid colon, unspecified type. Status post colonoscopy and polypectomy. Continue to monitor biopsy report pending  Prediabetes  A1c controlled continue to monitor continue weight reduction  -     POCT glycosylated hemoglobin (Hb A1C)  Essential hypertension  Controlled continue current treatment  Hospital discharge follow-up  -     OK DISCHARGE MEDS RECONCILED W/ CURRENT OUTPATIENT MED LIST      Medical Decision Making: moderate complexity  Return in about 4 weeks (around 5/24/2023) for right upper pain . On this date 4/26/2023 I have spent 40 minutes reviewing previous notes, test results and face to face with the patient discussing the diagnosis and importance of compliance with the treatment plan as well as documenting on the day of the visit. Subjective:   HPI:  Follow up of Hospital problems/diagnosis(es):   Patient is scheduled for hospital follow-up. Patient reports he was having right upper and lower quadrant pain which started about a week ago. Patient reports the pain was not getting better and he went to ER.   Patient had blood work done

## 2023-05-01 ENCOUNTER — CARE COORDINATION (OUTPATIENT)
Dept: CASE MANAGEMENT | Age: 53
End: 2023-05-01

## 2023-05-01 NOTE — CARE COORDINATION
[facility] Care Transitions Follow Up Call    Patient Current Location: [facility]        Patient: Riddhi Calderon  Patient : 1970   MRN: [id number]  Reason for Admission: colonic mass  Discharge Date: 23 RARS: Readmission Risk Score: 7.4        Subsequent transitional call. Spoke to :  attempt to call for transitional call x2 . Voicemail states the number is not in service. Will re attempt  Seen by PCP 23. Discussed follow-up appointments. If no appointment was previously scheduled, appointment scheduling offered: Yes. Is follow up appointment scheduled within 7 days of discharge? Yes.     Follow Up  Future Appointments   Date Time Provider Shruti Moore   2023 11:00 AM Juma Goodman MD [facility]   2023 11:45 AM Juma Goodman MD [facility]     Interventions to address risk factors: Obtained and reviewed discharge summary and/or continuity of care documents  \     Care Transitions Subsequent and Final Call    Subsequent and Final Calls  Care Transitions Interventions  Other Interventions:             ELYSE Delgado LPN Care Transitions Nurse   [phone]

## 2023-05-03 ENCOUNTER — CARE COORDINATION (OUTPATIENT)
Dept: CASE MANAGEMENT | Age: 53
End: 2023-05-03

## 2023-05-03 NOTE — CARE COORDINATION
Care Transitions Outreach Attempt. Sub attempt #2    Call within 2 business days of discharge: Yes   Attempted to reach patient for transitions of care follow up. Unable to reach patient. Writer attempted to call patient for 2nd sub call attempt. Unable to contact patient or leave message due to phone stating line is not in service. Did not attempt to call emergency contact due to not being listed on patient's HIPAA form. Patient did complete hospital follow up with PCP on 23 and has a 4 week recheck scheduled for 23. Will close care transitions episode due to making two unsuccessful outreach attempts. Patient: Carmella Castro Patient : 1970 MRN: [id number]    Last Discharge  Street       Date Complaint Diagnosis Description Type Department Provider    23  Colonic mass Admission (Discharged) ANASTASIA Richardson, DO              Was this an external facility discharge?  No Discharge Facility: [address] [place] [address]    Noted following upcoming appointments from discharge chart review:   [facility] follow up appointment(s):   Future Appointments   Date Time Provider Shruti Moore   2023 11:00 AM Yasmeen White MD [facility]   2023 11:45 AM Yasmeen White MD [facility]     [facility] follow up appointment(s):         Елена Park LPN  Care Transition Nurse  [phone]

## 2023-05-23 ENCOUNTER — OFFICE VISIT (OUTPATIENT)
Dept: FAMILY MEDICINE CLINIC | Age: 53
End: 2023-05-23
Payer: MEDICARE

## 2023-05-23 VITALS
SYSTOLIC BLOOD PRESSURE: 120 MMHG | WEIGHT: 232 LBS | BODY MASS INDEX: 33.21 KG/M2 | HEART RATE: 78 BPM | DIASTOLIC BLOOD PRESSURE: 80 MMHG | HEIGHT: 70 IN | OXYGEN SATURATION: 100 %

## 2023-05-23 DIAGNOSIS — N45.1 LEFT EPIDIDYMITIS: Primary | ICD-10-CM

## 2023-05-23 DIAGNOSIS — K21.9 GASTROESOPHAGEAL REFLUX DISEASE WITHOUT ESOPHAGITIS: ICD-10-CM

## 2023-05-23 DIAGNOSIS — N43.1 INFECTED HYDROCELE: ICD-10-CM

## 2023-05-23 DIAGNOSIS — I10 ESSENTIAL HYPERTENSION: ICD-10-CM

## 2023-05-23 DIAGNOSIS — N50.89 SCROTAL SWELLING: ICD-10-CM

## 2023-05-23 PROCEDURE — 1036F TOBACCO NON-USER: CPT | Performed by: FAMILY MEDICINE

## 2023-05-23 PROCEDURE — 3017F COLORECTAL CA SCREEN DOC REV: CPT | Performed by: FAMILY MEDICINE

## 2023-05-23 PROCEDURE — 3074F SYST BP LT 130 MM HG: CPT | Performed by: FAMILY MEDICINE

## 2023-05-23 PROCEDURE — 99214 OFFICE O/P EST MOD 30 MIN: CPT | Performed by: FAMILY MEDICINE

## 2023-05-23 PROCEDURE — G8417 CALC BMI ABV UP PARAM F/U: HCPCS | Performed by: FAMILY MEDICINE

## 2023-05-23 PROCEDURE — G8427 DOCREV CUR MEDS BY ELIG CLIN: HCPCS | Performed by: FAMILY MEDICINE

## 2023-05-23 PROCEDURE — 3079F DIAST BP 80-89 MM HG: CPT | Performed by: FAMILY MEDICINE

## 2023-05-23 RX ORDER — AMLODIPINE BESYLATE 10 MG/1
TABLET ORAL
Qty: 90 TABLET | Refills: 5 | Status: SHIPPED | OUTPATIENT
Start: 2023-05-23

## 2023-05-23 RX ORDER — CIPROFLOXACIN 500 MG/1
500 TABLET, FILM COATED ORAL 2 TIMES DAILY
Qty: 20 TABLET | Refills: 0 | Status: SHIPPED | OUTPATIENT
Start: 2023-05-23 | End: 2023-06-02

## 2023-05-23 ASSESSMENT — ENCOUNTER SYMPTOMS
TROUBLE SWALLOWING: 0
WHEEZING: 0
RECTAL PAIN: 0
SINUS PRESSURE: 0
ABDOMINAL DISTENTION: 0
STRIDOR: 0
SORE THROAT: 0
COUGH: 0
BACK PAIN: 0
EYE REDNESS: 0
BLOOD IN STOOL: 0
RHINORRHEA: 0
VOMITING: 0
ABDOMINAL PAIN: 0
COLOR CHANGE: 0
DIARRHEA: 0
CONSTIPATION: 0
CHEST TIGHTNESS: 0
SHORTNESS OF BREATH: 0
NAUSEA: 0

## 2023-05-23 NOTE — PROGRESS NOTES
Chief Complaint   Patient presents with    Hypertension    Other     Right upper quad pain x 4 weeks   Seen his urologist was given an antibiotic     Groin Swelling     Has testicular swelling again     Referral - General     Would like to have a referral to an [address]  here today for follow up on chronic medical problems, go over labs and/or diagnostic studies, and medication refills. Hypertension, Other (Right upper quad pain x 4 weeks /Seen his urologist was given an antibiotic ), Groin Swelling (Has testicular swelling again ), and Referral - General (Would like to have a referral to an allergist )      HPI: Patient is scheduled for follow-up on right upper quadrant pain and was also referred to neurologist.    Patient was treated for left epididymitis and completed antibiotic. Patient was seen by urologist reports he gave another dose of antibiotic but he did not took it. His swelling has improved and has resolved. Patient reports nondistended came back again and he also has noticed mild inflammation. He had ultrasound done that showed scrotal wall thickening and also epididymitis. Patient has small hydrocele. Patient reports the swelling and pain has resolved before completely. Patient was negative for STDs. Patient is concerned if his partner needs to be checked. Findings suggestive of bilateral epididymitis. Clinical follow-up is   recommended. 2.  Mild scrotal wall thickening. Patient also had right upper quadrant pain which has resolved. He was started on PPI reports pain has improved. He denies any side effects from medication. Recurrence of scrotal swelling had ultrasound, patient reports urologist does not want any further testing. Hypertension controlled, patient is on hydrochlorothiazide and amlodipine reports compliant denies any side effects from medication.         /80   Pulse 78   Ht 5' 10\" (1.778 m)   Wt 232 lb (105.2 kg)

## 2023-05-23 NOTE — PROGRESS NOTES
Visit Information    Have you changed or started any medications since your last visit including any over-the-counter medicines, vitamins, or herbal medicines? no   Are you having any side effects from any of your medications? -  no  Have you stopped taking any of your medications? Is so, why? -  no    Have you seen any other physician or provider since your last visit? No  Have you had any other diagnostic tests since your last visit? No  Have you been seen in the emergency room and/or had an admission to a hospital since we last saw you? No  Have you had your routine dental cleaning in the past 6 months? yes     Have you activated your [employer] account? If not, what are your barriers?  Yes     Patient Care Team:  Sylwia Hoyos MD as PCP - General (Family Medicine)  Sylwia Hoyos MD as PCP - Empaneled Provider  Sanjay Esquivel MD as Consulting Physician (Hematology and Oncology)  HARRY Wadsworth CNP as Consulting Physician (Nurse Practitioner)    Medical History Review  Past Medical, Family, and Social History reviewed and does contribute to the patient presenting condition    Health Maintenance   Topic Date Due    Annual Wellness Visit (AWV)  06/21/2023    Depression Monitoring  03/15/2024    A1C test (Diabetic or Prediabetic)  04/26/2024    Lipids  11/18/2026    DTaP/Tdap/Td vaccine (2 - Td or Tdap) 05/29/2028    Colorectal Cancer Screen  04/24/2033    Flu vaccine  Completed    Shingles vaccine  Completed    COVID-19 Vaccine  Completed    Hepatitis C screen  Completed    HIV screen  Completed    Hepatitis A vaccine  Aged Out    Hib vaccine  Aged Out    Meningococcal (ACWY) vaccine  Aged Out    Pneumococcal 0-64 years Vaccine  Aged Out    Diabetes screen  Discontinued

## 2023-05-23 NOTE — PATIENT INSTRUCTIONS
Thank you for choosing [facility]) as your healthcare provider as your care is important to us. Please arrive at your appointment on time. If you are unable to make your appointment, please call our office as soon as possible so that we may reschedule your appointment. Missing 3 appointments in a calendar year without notifying the office may lead to dismissal from the practice. We appreciate you calling at least 24 hours in advance, when possible. Thank you. New Updates for [facility]    Thank you for choosing [facility] to give you the best care! [facility]) is always trying to think of new ways to help their patients. We are asking all patients to try out the new digital registration that is now available through your [facility] account Via the anya you're now able to update your personal and registration information prior to your upcoming appointment. This will save you time once you arrive at the office to check-in, not to mention your information remains safe!! Many other perks come from signing up for an account, such as:  Requesting refills  Scheduling an appointment  Completing an E-Visit  Sending a message to the office/provider  Having access to your medication list  Paying your bill/copay prior to your appointment  Scheduling your yearly mammogram  Review your test results    If you are not familiar with [facility] please ask one of us and we will be happy to answer any questions or help you set-up your account.       Your [employer],

## 2023-05-31 ENCOUNTER — TELEPHONE (OUTPATIENT)
Dept: FAMILY MEDICINE CLINIC | Age: 53
End: 2023-05-31

## 2023-09-15 ENCOUNTER — HOSPITAL ENCOUNTER (OUTPATIENT)
Age: 53
Discharge: HOME | End: 2023-09-15
Payer: MEDICARE

## 2023-09-15 DIAGNOSIS — R73.03 PREDIABETES: ICD-10-CM

## 2023-09-15 DIAGNOSIS — R53.83 OTHER FATIGUE: ICD-10-CM

## 2023-09-15 DIAGNOSIS — I10 ESSENTIAL (PRIMARY) HYPERTENSION: ICD-10-CM

## 2023-09-15 DIAGNOSIS — Z12.5 PROSTATE CANCER SCREENING: ICD-10-CM

## 2023-09-15 DIAGNOSIS — E55.9 VITAMIN D DEFICIENCY: ICD-10-CM

## 2023-09-15 DIAGNOSIS — I10 ESSENTIAL HYPERTENSION: ICD-10-CM

## 2023-09-15 LAB
BASOPHILS # BLD: <0.03 K/UL (ref 0–0.2)
BASOPHILS NFR BLD: 0 % (ref 0–2)
EOSINOPHIL # BLD: 0.38 K/UL (ref 0–0.44)
EOSINOPHILS RELATIVE PERCENT: 8 % (ref 1–4)
ERYTHROCYTE [DISTWIDTH] IN BLOOD BY AUTOMATED COUNT: 12.5 % (ref 11.8–14.4)
EST. AVERAGE GLUCOSE BLD GHB EST-MCNC: 108 MG/DL
FOLATE SERPL-MCNC: >20 NG/ML (ref 4.8–24.2)
HBA1C MFR BLD: 5.4 % (ref 4–6)
HCT VFR BLD AUTO: 44 % (ref 40.7–50.3)
HGB BLD-MCNC: 14.9 G/DL (ref 13–17)
IMM GRANULOCYTES # BLD AUTO: <0.03 K/UL (ref 0–0.3)
IMM GRANULOCYTES NFR BLD: 0 %
LYMPHOCYTES NFR BLD: 2.15 K/UL (ref 1.1–3.7)
LYMPHOCYTES RELATIVE PERCENT: 44 % (ref 24–43)
MCH RBC QN AUTO: 28.5 PG (ref 25.2–33.5)
MCHC RBC AUTO-ENTMCNC: 33.9 G/DL (ref 28.4–34.8)
MCV RBC AUTO: 84.1 FL (ref 82.6–102.9)
MONOCYTES NFR BLD: 0.5 K/UL (ref 0.1–1.2)
MONOCYTES NFR BLD: 10 % (ref 3–12)
NEUTROPHILS NFR BLD: 38 % (ref 36–65)
NEUTS SEG NFR BLD: 1.89 K/UL (ref 1.5–8.1)
NRBC BLD-RTO: 0 PER 100 WBC
PLATELET # BLD AUTO: 276 K/UL (ref 138–453)
PMV BLD AUTO: 9.3 FL (ref 8.1–13.5)
PSA SERPL-MCNC: 1.8 NG/ML (ref 0–4)
RBC # BLD AUTO: 5.23 M/UL (ref 4.21–5.77)
VIT B12 SERPL-MCNC: 508 PG/ML (ref 232–1245)
WBC OTHER # BLD: 5 K/UL (ref 3.5–11.3)

## 2023-09-15 PROCEDURE — G0103 PSA SCREENING: HCPCS

## 2023-09-15 PROCEDURE — 80061 LIPID PANEL: CPT

## 2023-09-15 PROCEDURE — 84550 ASSAY OF BLOOD/URIC ACID: CPT

## 2023-09-15 PROCEDURE — 83036 HEMOGLOBIN GLYCOSYLATED A1C: CPT

## 2023-09-15 PROCEDURE — 84443 ASSAY THYROID STIM HORMONE: CPT

## 2023-09-15 PROCEDURE — 85025 COMPLETE CBC W/AUTO DIFF WBC: CPT

## 2023-09-15 PROCEDURE — 82607 VITAMIN B-12: CPT

## 2023-09-15 PROCEDURE — 82306 VITAMIN D 25 HYDROXY: CPT

## 2023-09-15 PROCEDURE — 36415 COLL VENOUS BLD VENIPUNCTURE: CPT

## 2023-09-15 PROCEDURE — 82746 ASSAY OF FOLIC ACID SERUM: CPT

## 2023-09-16 LAB
25(OH)D3 SERPL-MCNC: 24.7 NG/ML
CHOLEST SERPL-MCNC: 158 MG/DL
CHOLESTEROL/HDL RATIO: 3.8
HDLC SERPL-MCNC: 42 MG/DL
LDLC SERPL CALC-MCNC: 100 MG/DL (ref 0–130)
TRIGL SERPL-MCNC: 79 MG/DL
TSH SERPL DL<=0.05 MIU/L-ACNC: 0.99 UIU/ML (ref 0.3–5)
URATE SERPL-MCNC: 5.9 MG/DL (ref 3.4–7)

## 2023-09-17 RX ORDER — MELATONIN
2000 DAILY
Qty: 90 TABLET | Refills: 1 | Status: SHIPPED | OUTPATIENT
Start: 2023-09-17

## 2023-09-19 ENCOUNTER — HOSPITAL ENCOUNTER (OUTPATIENT)
Facility: CLINIC | Age: 53
Discharge: HOME | End: 2023-09-21
Payer: MEDICARE

## 2023-09-19 ENCOUNTER — HOSPITAL ENCOUNTER (OUTPATIENT)
Dept: GENERAL RADIOLOGY | Facility: CLINIC | Age: 53
Discharge: HOME | End: 2023-09-21
Payer: MEDICARE

## 2023-09-19 ENCOUNTER — OFFICE VISIT (OUTPATIENT)
Dept: FAMILY MEDICINE CLINIC | Age: 53
End: 2023-09-19
Payer: MEDICARE

## 2023-09-19 VITALS
HEIGHT: 70 IN | BODY MASS INDEX: 34.79 KG/M2 | WEIGHT: 243 LBS | SYSTOLIC BLOOD PRESSURE: 138 MMHG | OXYGEN SATURATION: 98 % | DIASTOLIC BLOOD PRESSURE: 80 MMHG | HEART RATE: 80 BPM

## 2023-09-19 DIAGNOSIS — M25.561 PAIN AND SWELLING OF KNEE, RIGHT: ICD-10-CM

## 2023-09-19 DIAGNOSIS — F31.75 BIPOLAR DISORDER, IN PARTIAL REMISSION, MOST RECENT EPISODE DEPRESSED (HCC): ICD-10-CM

## 2023-09-19 DIAGNOSIS — G47.33 OSA ON CPAP: ICD-10-CM

## 2023-09-19 DIAGNOSIS — T78.40XA WHEEZING DUE TO ALLERGY: ICD-10-CM

## 2023-09-19 DIAGNOSIS — Z99.89 OSA ON CPAP: ICD-10-CM

## 2023-09-19 DIAGNOSIS — I10 ESSENTIAL HYPERTENSION: Primary | ICD-10-CM

## 2023-09-19 DIAGNOSIS — R73.03 PREDIABETES: ICD-10-CM

## 2023-09-19 DIAGNOSIS — R06.2 WHEEZING DUE TO ALLERGY: ICD-10-CM

## 2023-09-19 DIAGNOSIS — M25.461 PAIN AND SWELLING OF KNEE, RIGHT: ICD-10-CM

## 2023-09-19 DIAGNOSIS — E55.9 VITAMIN D DEFICIENCY: ICD-10-CM

## 2023-09-19 DIAGNOSIS — N18.2 STAGE 2 CHRONIC KIDNEY DISEASE: ICD-10-CM

## 2023-09-19 PROBLEM — N45.1 LEFT EPIDIDYMITIS: Status: RESOLVED | Noted: 2023-03-15 | Resolved: 2023-09-19

## 2023-09-19 PROBLEM — N28.9 RENAL INSUFFICIENCY: Status: RESOLVED | Noted: 2018-11-27 | Resolved: 2023-09-19

## 2023-09-19 PROBLEM — L50.9 URTICARIA: Status: RESOLVED | Noted: 2020-04-14 | Resolved: 2023-09-19

## 2023-09-19 PROCEDURE — 3079F DIAST BP 80-89 MM HG: CPT | Performed by: FAMILY MEDICINE

## 2023-09-19 PROCEDURE — G8427 DOCREV CUR MEDS BY ELIG CLIN: HCPCS | Performed by: FAMILY MEDICINE

## 2023-09-19 PROCEDURE — 73560 X-RAY EXAM OF KNEE 1 OR 2: CPT

## 2023-09-19 PROCEDURE — G8417 CALC BMI ABV UP PARAM F/U: HCPCS | Performed by: FAMILY MEDICINE

## 2023-09-19 PROCEDURE — 3017F COLORECTAL CA SCREEN DOC REV: CPT | Performed by: FAMILY MEDICINE

## 2023-09-19 PROCEDURE — 99214 OFFICE O/P EST MOD 30 MIN: CPT | Performed by: FAMILY MEDICINE

## 2023-09-19 PROCEDURE — 3075F SYST BP GE 130 - 139MM HG: CPT | Performed by: FAMILY MEDICINE

## 2023-09-19 PROCEDURE — 1036F TOBACCO NON-USER: CPT | Performed by: FAMILY MEDICINE

## 2023-09-19 RX ORDER — MONTELUKAST SODIUM 10 MG/1
TABLET ORAL
COMMUNITY
Start: 2023-09-18

## 2023-09-19 RX ORDER — DEXTROAMPHETAMINE SACCHARATE, AMPHETAMINE ASPARTATE MONOHYDRATE, DEXTROAMPHETAMINE SULFATE AND AMPHETAMINE SULFATE 7.5; 7.5; 7.5; 7.5 MG/1; MG/1; MG/1; MG/1
CAPSULE, EXTENDED RELEASE ORAL
COMMUNITY
Start: 2023-09-18

## 2023-09-19 RX ORDER — ALBUTEROL SULFATE 90 UG/1
2 AEROSOL, METERED RESPIRATORY (INHALATION) 4 TIMES DAILY PRN
Qty: 18 G | Refills: 0 | Status: SHIPPED | OUTPATIENT
Start: 2023-09-19

## 2023-09-19 ASSESSMENT — ENCOUNTER SYMPTOMS
TROUBLE SWALLOWING: 0
SHORTNESS OF BREATH: 0
SORE THROAT: 0
CONSTIPATION: 0
VOMITING: 0
RHINORRHEA: 0
BLOOD IN STOOL: 0
EYE REDNESS: 0
SINUS PRESSURE: 1
COUGH: 1
ABDOMINAL PAIN: 0
COLOR CHANGE: 0
WHEEZING: 1
DIARRHEA: 0
RECTAL PAIN: 0
ABDOMINAL DISTENTION: 0
STRIDOR: 0
NAUSEA: 0
CHEST TIGHTNESS: 0
BACK PAIN: 0

## 2023-09-19 NOTE — PATIENT INSTRUCTIONS
Thank you for choosing [facility]) as your healthcare provider as your care is important to us. Please arrive at your appointment on time. If you are unable to make your appointment, please call our office as soon as possible so that we may reschedule your appointment. Missing 3 appointments in a calendar year without notifying the office may lead to dismissal from the practice. We appreciate you calling at least 24 hours in advance, when possible. Thank you. New Updates for [facility]    Thank you for choosing [facility] to give you the best care! [facility]) is always trying to think of new ways to help their patients. We are asking all patients to try out the new digital registration that is now available through your [facility] account Via the anya you're now able to update your personal and registration information prior to your upcoming appointment. This will save you time once you arrive at the office to check-in, not to mention your information remains safe!! Many other perks come from signing up for an account, such as:  Requesting refills  Scheduling an appointment  Completing an E-Visit  Sending a message to the office/provider  Having access to your medication list  Paying your bill/copay prior to your appointment  Scheduling your yearly mammogram  Review your test results    If you are not familiar with [facility] please ask one of us and we will be happy to answer any questions or help you set-up your account.       Your [employer],

## 2023-09-29 ENCOUNTER — TELEPHONE (OUTPATIENT)
Dept: FAMILY MEDICINE CLINIC | Age: 53
End: 2023-09-29

## 2023-09-29 DIAGNOSIS — M25.361 INSTABILITY OF RIGHT KNEE JOINT: Primary | ICD-10-CM

## 2023-09-29 NOTE — TELEPHONE ENCOUNTER
Received a call from [address] [place] [address] that the diagnosis code for knee brace is not covered. The patients insurance is medicare based and so the code has to do with some sort of instability and has to have testing to prove this. Please advise if this is able to be changed?     Ghassan   [phone]

## 2023-10-06 NOTE — TELEPHONE ENCOUNTER
WADE FROM [facility] CALLED STATING CHART NOTES MUST STATED THAT PATIENT HAS INSTABILITY/ANY ISSUES/TESTS PREFORMED TO SHOW ISSUSES IN ORDER FOR THIS TO BE COVERED BY INSURANCE.

## 2023-11-14 DIAGNOSIS — N52.9 ERECTILE DYSFUNCTION, UNSPECIFIED ERECTILE DYSFUNCTION TYPE: ICD-10-CM

## 2023-11-14 RX ORDER — SILDENAFIL 100 MG/1
TABLET, FILM COATED ORAL
Qty: 60 TABLET | Refills: 0 | Status: SHIPPED | OUTPATIENT
Start: 2023-11-14 | End: 2023-11-15

## 2023-11-14 RX ORDER — SILDENAFIL 100 MG/1
TABLET, FILM COATED ORAL
Qty: 60 TABLET | Refills: 1 | OUTPATIENT
Start: 2023-11-14

## 2023-11-14 NOTE — TELEPHONE ENCOUNTER
Please Approve or Refuse.  Send to Pharmacy per Pt's Request:      Next Visit Date:  11/14/2023   Last Visit Date: 9/19/2023    Hemoglobin A1C (%)   Date Value   09/15/2023 5.4   04/26/2023 5.5   01/23/2023 5.9             ( goal A1C is < 7)   BP Readings from Last 3 Encounters:   09/19/23 138/80   06/16/23 134/82   05/23/23 120/80          (goal 120/80)  BUN   Date Value Ref Range Status   04/21/2023 9 6 - 20 mg/dL Final     Creatinine   Date Value Ref Range Status   04/21/2023 1.05 0.70 - 1.20 mg/dL Final     Potassium   Date Value Ref Range Status   04/21/2023 3.7 3.7 - 5.3 mmol/L Final

## 2023-11-14 NOTE — TELEPHONE ENCOUNTER
Please Approve or Refuse.   Send to Pharmacy per Pt's Request:      Next Visit Date:  12/18/2023   Last Visit Date: 9/19/2023    Hemoglobin A1C (%)   Date Value   09/15/2023 5.4   04/26/2023 5.5   01/23/2023 5.9             ( goal A1C is < 7)   BP Readings from Last 3 Encounters:   09/19/23 138/80   06/16/23 134/82   05/23/23 120/80          (goal 120/80)  BUN   Date Value Ref Range Status   04/21/2023 9 6 - 20 mg/dL Final     Creatinine   Date Value Ref Range Status   04/21/2023 1.05 0.70 - 1.20 mg/dL Final     Potassium   Date Value Ref Range Status   04/21/2023 3.7 3.7 - 5.3 mmol/L Final

## 2023-11-15 RX ORDER — SILDENAFIL 100 MG/1
TABLET, FILM COATED ORAL
Qty: 60 TABLET | Refills: 0 | Status: SHIPPED | OUTPATIENT
Start: 2023-11-15

## 2023-11-24 ENCOUNTER — HOSPITAL ENCOUNTER (EMERGENCY)
Age: 53
Discharge: HOME | End: 2023-11-24
Attending: EMERGENCY MEDICINE
Payer: MEDICARE

## 2023-11-24 ENCOUNTER — TELEPHONE (OUTPATIENT)
Dept: FAMILY MEDICINE CLINIC | Age: 53
End: 2023-11-24

## 2023-11-24 ENCOUNTER — APPOINTMENT (OUTPATIENT)
Dept: GENERAL RADIOLOGY | Age: 53
End: 2023-11-24
Payer: MEDICARE

## 2023-11-24 VITALS
DIASTOLIC BLOOD PRESSURE: 92 MMHG | TEMPERATURE: 99.5 F | OXYGEN SATURATION: 99 % | WEIGHT: 240 LBS | RESPIRATION RATE: 16 BRPM | HEART RATE: 99 BPM | BODY MASS INDEX: 34.36 KG/M2 | SYSTOLIC BLOOD PRESSURE: 147 MMHG | HEIGHT: 70 IN

## 2023-11-24 DIAGNOSIS — J10.1 INFLUENZA A: ICD-10-CM

## 2023-11-24 DIAGNOSIS — R05.1 ACUTE COUGH: ICD-10-CM

## 2023-11-24 DIAGNOSIS — R07.89 CHEST WALL PAIN: Primary | ICD-10-CM

## 2023-11-24 LAB
ANION GAP SERPL CALCULATED.3IONS-SCNC: 14 MMOL/L (ref 9–17)
BASOPHILS # BLD: 0.04 K/UL (ref 0–0.2)
BASOPHILS NFR BLD: 1 %
BUN SERPL-MCNC: 8 MG/DL (ref 6–20)
BUN/CREAT SERPL: 7 (ref 9–20)
CALCIUM SERPL-MCNC: 9.3 MG/DL (ref 8.6–10.4)
CHLORIDE SERPL-SCNC: 99 MMOL/L (ref 98–107)
CO2 SERPL-SCNC: 25 MMOL/L (ref 20–31)
CREAT SERPL-MCNC: 1.2 MG/DL (ref 0.7–1.2)
D DIMER PPP FEU-MCNC: 0.71 UG/ML FEU (ref 0–0.59)
EKG ATRIAL RATE: 106 BPM
EKG P AXIS: 60 DEGREES
EKG P-R INTERVAL: 150 MS
EKG Q-T INTERVAL: 336 MS
EKG QRS DURATION: 78 MS
EKG QTC CALCULATION (BAZETT): 446 MS
EKG R AXIS: 18 DEGREES
EKG T AXIS: -6 DEGREES
EKG VENTRICULAR RATE: 106 BPM
EOSINOPHIL # BLD: 0 K/UL (ref 0–0.4)
EOSINOPHILS RELATIVE PERCENT: 0 % (ref 1–4)
ERYTHROCYTE [DISTWIDTH] IN BLOOD BY AUTOMATED COUNT: 13.1 % (ref 11.8–14.4)
FLUAV RNA RESP QL NAA+PROBE: DETECTED
FLUBV RNA RESP QL NAA+PROBE: NOT DETECTED
GFR SERPL CREATININE-BSD FRML MDRD: >60 ML/MIN/1.73M2
GLUCOSE SERPL-MCNC: 117 MG/DL (ref 70–99)
HCT VFR BLD AUTO: 42.5 % (ref 40.7–50.3)
HGB BLD-MCNC: 14.8 G/DL (ref 13–17)
IMM GRANULOCYTES # BLD AUTO: 0 K/UL (ref 0–0.3)
IMM GRANULOCYTES NFR BLD: 0 %
LYMPHOCYTES NFR BLD: 0.53 K/UL (ref 1–4.8)
LYMPHOCYTES RELATIVE PERCENT: 14 % (ref 24–44)
MCH RBC QN AUTO: 29.2 PG (ref 25.2–33.5)
MCHC RBC AUTO-ENTMCNC: 34.8 G/DL (ref 28.4–34.8)
MCV RBC AUTO: 83.8 FL (ref 82.6–102.9)
MONOCYTES NFR BLD: 0.8 K/UL (ref 0.2–0.8)
MONOCYTES NFR BLD: 21 % (ref 1–7)
NEUTROPHILS NFR BLD: 64 % (ref 36–66)
NEUTS SEG NFR BLD: 2.43 K/UL (ref 1.8–7.7)
NRBC BLD-RTO: 0 PER 100 WBC
PLATELET # BLD AUTO: 214 K/UL (ref 138–453)
PMV BLD AUTO: 8.9 FL (ref 8.1–13.5)
POTASSIUM SERPL-SCNC: 4 MMOL/L (ref 3.7–5.3)
RBC # BLD AUTO: 5.07 M/UL (ref 4.21–5.77)
SARS-COV-2 RNA RESP QL NAA+PROBE: NOT DETECTED
SODIUM SERPL-SCNC: 138 MMOL/L (ref 135–144)
SOURCE: ABNORMAL
SPECIMEN DESCRIPTION: ABNORMAL
TROPONIN I SERPL HS-MCNC: 18 NG/L (ref 0–22)
WBC OTHER # BLD: 3.8 K/UL (ref 3.5–11.3)

## 2023-11-24 PROCEDURE — 80048 BASIC METABOLIC PNL TOTAL CA: CPT

## 2023-11-24 PROCEDURE — 6360000002 HC RX W HCPCS: Performed by: EMERGENCY MEDICINE

## 2023-11-24 PROCEDURE — 85025 COMPLETE CBC W/AUTO DIFF WBC: CPT

## 2023-11-24 PROCEDURE — 85379 FIBRIN DEGRADATION QUANT: CPT

## 2023-11-24 PROCEDURE — 99285 EMERGENCY DEPT VISIT HI MDM: CPT

## 2023-11-24 PROCEDURE — 71045 X-RAY EXAM CHEST 1 VIEW: CPT

## 2023-11-24 PROCEDURE — 93005 ELECTROCARDIOGRAM TRACING: CPT | Performed by: EMERGENCY MEDICINE

## 2023-11-24 PROCEDURE — 87636 SARSCOV2 & INF A&B AMP PRB: CPT

## 2023-11-24 PROCEDURE — 6370000000 HC RX 637 (ALT 250 FOR IP): Performed by: EMERGENCY MEDICINE

## 2023-11-24 PROCEDURE — 84484 ASSAY OF TROPONIN QUANT: CPT

## 2023-11-24 RX ORDER — BENZONATATE 100 MG/1
100 CAPSULE ORAL 2 TIMES DAILY PRN
Qty: 20 CAPSULE | Refills: 0 | Status: SHIPPED | OUTPATIENT
Start: 2023-11-24 | End: 2023-12-04

## 2023-11-24 RX ORDER — DEXAMETHASONE SODIUM PHOSPHATE 10 MG/ML
8 INJECTION, SOLUTION INTRAMUSCULAR; INTRAVENOUS ONCE
Status: COMPLETED | OUTPATIENT
Start: 2023-11-24 | End: 2023-11-24

## 2023-11-24 RX ORDER — GUAIFENESIN 600 MG/1
600 TABLET, EXTENDED RELEASE ORAL 2 TIMES DAILY
Qty: 30 TABLET | Refills: 0 | Status: SHIPPED | OUTPATIENT
Start: 2023-11-24 | End: 2023-12-09

## 2023-11-24 RX ORDER — DIPHENHYDRAMINE HYDROCHLORIDE 50 MG/ML
12.5 INJECTION INTRAMUSCULAR; INTRAVENOUS ONCE
Status: DISCONTINUED | OUTPATIENT
Start: 2023-11-24 | End: 2023-11-24

## 2023-11-24 RX ORDER — ASPIRIN 81 MG/1
324 TABLET, CHEWABLE ORAL ONCE
Status: COMPLETED | OUTPATIENT
Start: 2023-11-24 | End: 2023-11-24

## 2023-11-24 RX ORDER — BENZONATATE 100 MG/1
100 CAPSULE ORAL ONCE
Status: COMPLETED | OUTPATIENT
Start: 2023-11-24 | End: 2023-11-24

## 2023-11-24 RX ADMIN — BENZONATATE 100 MG: 100 CAPSULE ORAL at 04:51

## 2023-11-24 RX ADMIN — DEXAMETHASONE SODIUM PHOSPHATE 8 MG: 10 INJECTION, SOLUTION INTRAMUSCULAR; INTRAVENOUS at 06:11

## 2023-11-24 RX ADMIN — ASPIRIN 81 MG CHEWABLE TABLET 324 MG: 81 TABLET CHEWABLE at 04:51

## 2023-11-24 ASSESSMENT — PAIN DESCRIPTION - DESCRIPTORS: DESCRIPTORS: PRESSURE;BURNING

## 2023-11-24 ASSESSMENT — PAIN DESCRIPTION - LOCATION: LOCATION: CHEST

## 2023-11-24 ASSESSMENT — PAIN DESCRIPTION - PAIN TYPE: TYPE: ACUTE PAIN

## 2023-11-24 ASSESSMENT — PAIN DESCRIPTION - FREQUENCY: FREQUENCY: INTERMITTENT

## 2023-11-24 ASSESSMENT — PAIN - FUNCTIONAL ASSESSMENT: PAIN_FUNCTIONAL_ASSESSMENT: 0-10

## 2023-11-24 ASSESSMENT — PAIN DESCRIPTION - ORIENTATION: ORIENTATION: MID

## 2023-11-24 ASSESSMENT — ENCOUNTER SYMPTOMS
COUGH: 1
SHORTNESS OF BREATH: 1

## 2023-11-24 NOTE — ED NOTES
Pt presents to ED via private auto with c/o chest pain, Sob and cough, onset two days ago. Even, non-labored breathing. Pt afebrile, vitals stable.  Pt able to ambulate without assist.      Jeremiah Barger RN  11/24/23 0253

## 2023-11-24 NOTE — TELEPHONE ENCOUNTER
[facility]) ED Follow up Call    Reason for ED visit:  CHEST PAIN, SHORTNESS OF BREATH, COUGH  INFLUENZA A          FU appts/Provider:    Future Appointments   Date Time Provider [address]   12/18/2023 10:00 AM Angel Ferrell MD [facility]   1/10/2024  3:15 PM Danny Dias MD [facility] [place] [address] IF NOT USED  Hi, this message is for  ZULEYKA   This is Maliha Likes from [facility] office. Just calling to see how you are doing after your recent visit to the Emergency Room. [facility] wants to make sure you were able to fill any prescriptions and that you understand your discharge instructions. Please return our call if you need to make a follow up appointment with your provider or have any further needs. Our phone number is [phone]. Have a great day.

## 2023-11-24 NOTE — ED NOTES
Critical result received from Annetta, pt positive for flu A     Portia Barnett, [place]  11/24/23 [id number]

## 2023-11-24 NOTE — DISCHARGE INSTRUCTIONS
Influenza can cause cough sore throat congestion headaches fever nausea vomiting as well as other symptoms. Influenza usually lasts 5 to 10 days. You can take Tylenol or an anti-inflammatory to help with fever headaches or sore throat. Mucinex and Tessalon can help with cough. If you do have significant worsening of symptoms you may return to the emergency room at any time.

## 2023-11-27 ENCOUNTER — CARE COORDINATION (OUTPATIENT)
Dept: CARE COORDINATION | Age: 53
End: 2023-11-27

## 2023-11-27 NOTE — CARE COORDINATION
Ambulatory Care Coordination  ED Follow up Call    Reason for ED visit:  chest pain, cough, shortness of breath; + Flu A   Status:     improved    Did you call your PCP prior to going to the ED? No      Did you receive a discharge instructions from the Emergency Room? Yes  Review of Instructions:     Understands what to report/when to return?:  Yes   Understands discharge instructions?:  Yes   Following discharge instructions?:  Yes   If not why? Are there any new complaints of pain? No  New Pain Meds? No    Constipation prophylaxis needed? N/A    If you have a wound is the dressing clean, dry, and intact? N/A  Understands wound care regimen? N/A    Are there any other complaints/concerns that you wish to tell your provider? no    FU appts/Provider:    Future Appointments   Date Time Provider [address]   12/18/2023 10:00 AM [facility]   1/10/2024  3:15 PM [facility]           New Medications?:   Yes- Benzonatate, and guaifenesin     Medication Reconciliation by phone - Yes  Understands Medications? Yes  Taking Medications? Yes- taking the guaifenesin; will  the benzonatate   Can you swallow your pills? Yes    Any further needs in the home i.e. Equipment? No    Link to services in community?:  Yes   Which services:  Charity Luke states he is feeling a little better this morning. He said he is still coughing. Has a productive cough. Is taking Mucinex. Denies any chest pain or shortness of breath. States he will get the Fay from the pharmacy. He has been isolating himself from others in the home. Encouraged plenty of fluids and rest. He has a f/u with PCP on 12/18. Encouraged sooner appt if symptoms not improving. Instructed to return to the ED if any worsening symptoms, chest pain, dizziness, or shortness of breath.

## 2023-12-11 DIAGNOSIS — R35.1 BENIGN PROSTATIC HYPERPLASIA WITH NOCTURIA: ICD-10-CM

## 2023-12-11 DIAGNOSIS — N40.1 BENIGN PROSTATIC HYPERPLASIA WITH NOCTURIA: ICD-10-CM

## 2023-12-11 RX ORDER — TAMSULOSIN HYDROCHLORIDE 0.4 MG/1
CAPSULE ORAL
Qty: 90 CAPSULE | Refills: 1 | Status: SHIPPED | OUTPATIENT
Start: 2023-12-11

## 2023-12-11 NOTE — TELEPHONE ENCOUNTER
Please Approve or Refuse.   Send to Pharmacy per Pt's Request:      Next Visit Date:  1/8/2024   Last Visit Date: 9/19/2023    Hemoglobin A1C (%)   Date Value   09/15/2023 5.4   04/26/2023 5.5   01/23/2023 5.9             ( goal A1C is < 7)   BP Readings from Last 3 Encounters:   11/24/23 (!) 147/92   09/19/23 138/80   06/16/23 134/82          (goal 120/80)  BUN   Date Value Ref Range Status   11/24/2023 8 6 - 20 mg/dL Final     Creatinine   Date Value Ref Range Status   11/24/2023 1.2 0.7 - 1.2 mg/dL Final     Potassium   Date Value Ref Range Status   11/24/2023 4.0 3.7 - 5.3 mmol/L Final

## 2023-12-11 NOTE — TELEPHONE ENCOUNTER
----- Message from [address] sent at 12/11/2023  9:57 AM EST -----  Subject: Refill Request    QUESTIONS  Name of Medication? tamsulosin (FLOMAX) 0.4 MG capsule  Patient-reported dosage and instructions? .4mg 1 tablet a day  How many days do you have left? 0  Preferred Pharmacy? [address] [place] [address] [address]  Pharmacy phone number (if available)? [phone]  ---------------------------------------------------------------------------  --------------  CALL BACK INFO  What is the best way for the office to contact you? OK to leave message on   voicemail  Preferred Call Back Phone Number? [phone]  ---------------------------------------------------------------------------  --------------  SCRIPT ANSWERS  Relationship to Patient?  Self

## 2024-01-03 DIAGNOSIS — N52.9 ERECTILE DYSFUNCTION, UNSPECIFIED ERECTILE DYSFUNCTION TYPE: ICD-10-CM

## 2024-01-04 DIAGNOSIS — N52.9 ERECTILE DYSFUNCTION, UNSPECIFIED ERECTILE DYSFUNCTION TYPE: ICD-10-CM

## 2024-01-04 RX ORDER — SILDENAFIL 100 MG/1
TABLET, FILM COATED ORAL
Qty: 60 TABLET | Refills: 0 | Status: SHIPPED | OUTPATIENT
Start: 2024-01-04 | End: 2024-01-05

## 2024-01-04 RX ORDER — SILDENAFIL 100 MG/1
TABLET, FILM COATED ORAL
Qty: 60 TABLET | Refills: 0 | Status: SHIPPED | OUTPATIENT
Start: 2024-01-04 | End: 2024-01-04

## 2024-01-05 PROBLEM — R45.851 SUICIDAL IDEATION: Status: ACTIVE | Noted: 2023-07-18

## 2024-01-05 PROBLEM — F14.20 COCAINE USE DISORDER, SEVERE, DEPENDENCE (HCC): Status: ACTIVE | Noted: 2023-07-21

## 2024-01-05 PROBLEM — N13.8 BPH WITH OBSTRUCTION/LOWER URINARY TRACT SYMPTOMS: Status: ACTIVE | Noted: 2022-05-09

## 2024-01-05 PROBLEM — T78.40XA ALLERGIES: Status: ACTIVE | Noted: 2023-07-20

## 2024-01-05 PROBLEM — F10.20 ALCOHOL USE DISORDER, SEVERE, DEPENDENCE (HCC): Status: ACTIVE | Noted: 2023-07-21

## 2024-01-05 PROBLEM — N40.1 BPH WITH OBSTRUCTION/LOWER URINARY TRACT SYMPTOMS: Status: ACTIVE | Noted: 2022-05-09

## 2024-01-05 RX ORDER — SILDENAFIL 100 MG/1
TABLET, FILM COATED ORAL
Qty: 60 TABLET | Refills: 0 | Status: SHIPPED | OUTPATIENT
Start: 2024-01-05

## 2024-01-08 ENCOUNTER — OFFICE VISIT (OUTPATIENT)
Dept: FAMILY MEDICINE CLINIC | Age: 54
End: 2024-01-08
Payer: MEDICARE

## 2024-01-08 VITALS
SYSTOLIC BLOOD PRESSURE: 132 MMHG | BODY MASS INDEX: 34.7 KG/M2 | DIASTOLIC BLOOD PRESSURE: 78 MMHG | WEIGHT: 242.4 LBS | TEMPERATURE: 97.6 F | HEIGHT: 70 IN | OXYGEN SATURATION: 96 % | HEART RATE: 86 BPM

## 2024-01-08 DIAGNOSIS — K42.9 UMBILICAL HERNIA WITHOUT OBSTRUCTION AND WITHOUT GANGRENE: ICD-10-CM

## 2024-01-08 DIAGNOSIS — T78.2XXS ANAPHYLAXIS, SEQUELA: ICD-10-CM

## 2024-01-08 DIAGNOSIS — N18.2 STAGE 2 CHRONIC KIDNEY DISEASE: ICD-10-CM

## 2024-01-08 DIAGNOSIS — E55.9 VITAMIN D DEFICIENCY: ICD-10-CM

## 2024-01-08 DIAGNOSIS — N52.9 ERECTILE DYSFUNCTION, UNSPECIFIED ERECTILE DYSFUNCTION TYPE: ICD-10-CM

## 2024-01-08 DIAGNOSIS — F31.75 BIPOLAR DISORDER, IN PARTIAL REMISSION, MOST RECENT EPISODE DEPRESSED (HCC): ICD-10-CM

## 2024-01-08 DIAGNOSIS — R73.03 PREDIABETES: ICD-10-CM

## 2024-01-08 DIAGNOSIS — F10.20 ALCOHOL USE DISORDER, SEVERE, DEPENDENCE (HCC): ICD-10-CM

## 2024-01-08 DIAGNOSIS — Z23 NEED FOR INFLUENZA VACCINATION: ICD-10-CM

## 2024-01-08 DIAGNOSIS — F14.20 COCAINE USE DISORDER, SEVERE, DEPENDENCE (HCC): ICD-10-CM

## 2024-01-08 DIAGNOSIS — I10 ESSENTIAL HYPERTENSION: Primary | ICD-10-CM

## 2024-01-08 PROBLEM — K63.89 COLONIC MASS: Status: RESOLVED | Noted: 2023-04-20 | Resolved: 2024-01-08

## 2024-01-08 PROBLEM — N43.1 INFECTED HYDROCELE: Status: RESOLVED | Noted: 2023-03-15 | Resolved: 2024-01-08

## 2024-01-08 PROBLEM — R53.83 FATIGUE: Status: RESOLVED | Noted: 2021-03-17 | Resolved: 2024-01-08

## 2024-01-08 PROBLEM — T78.40XA WHEEZING DUE TO ALLERGY: Status: RESOLVED | Noted: 2023-09-19 | Resolved: 2024-01-08

## 2024-01-08 PROBLEM — R06.2 WHEEZING DUE TO ALLERGY: Status: RESOLVED | Noted: 2023-09-19 | Resolved: 2024-01-08

## 2024-01-08 PROBLEM — N50.89 SCROTAL SWELLING: Status: RESOLVED | Noted: 2023-03-15 | Resolved: 2024-01-08

## 2024-01-08 PROBLEM — R45.851 SUICIDAL IDEATION: Status: RESOLVED | Noted: 2023-07-18 | Resolved: 2024-01-08

## 2024-01-08 PROBLEM — T78.2XXA ANAPHYLACTIC SYNDROME: Status: ACTIVE | Noted: 2024-01-08

## 2024-01-08 PROCEDURE — 1036F TOBACCO NON-USER: CPT | Performed by: FAMILY MEDICINE

## 2024-01-08 PROCEDURE — 3017F COLORECTAL CA SCREEN DOC REV: CPT | Performed by: FAMILY MEDICINE

## 2024-01-08 PROCEDURE — 3075F SYST BP GE 130 - 139MM HG: CPT | Performed by: FAMILY MEDICINE

## 2024-01-08 PROCEDURE — G8427 DOCREV CUR MEDS BY ELIG CLIN: HCPCS | Performed by: FAMILY MEDICINE

## 2024-01-08 PROCEDURE — G0008 ADMIN INFLUENZA VIRUS VAC: HCPCS | Performed by: FAMILY MEDICINE

## 2024-01-08 PROCEDURE — 99214 OFFICE O/P EST MOD 30 MIN: CPT | Performed by: FAMILY MEDICINE

## 2024-01-08 PROCEDURE — 3078F DIAST BP <80 MM HG: CPT | Performed by: FAMILY MEDICINE

## 2024-01-08 PROCEDURE — G8482 FLU IMMUNIZE ORDER/ADMIN: HCPCS | Performed by: FAMILY MEDICINE

## 2024-01-08 PROCEDURE — G8417 CALC BMI ABV UP PARAM F/U: HCPCS | Performed by: FAMILY MEDICINE

## 2024-01-08 PROCEDURE — 90674 CCIIV4 VAC NO PRSV 0.5 ML IM: CPT | Performed by: FAMILY MEDICINE

## 2024-01-08 RX ORDER — ARIPIPRAZOLE 5 MG/1
TABLET ORAL
COMMUNITY
Start: 2023-12-11

## 2024-01-08 RX ORDER — SILDENAFIL 100 MG/1
TABLET, FILM COATED ORAL
Qty: 60 TABLET | Refills: 0 | Status: SHIPPED | OUTPATIENT
Start: 2024-01-08

## 2024-01-08 RX ORDER — EPINEPHRINE 0.3 MG/.3ML
INJECTION SUBCUTANEOUS
Qty: 2 EACH | Refills: 1 | Status: SHIPPED | OUTPATIENT
Start: 2024-01-08

## 2024-01-08 SDOH — ECONOMIC STABILITY: FOOD INSECURITY: WITHIN THE PAST 12 MONTHS, YOU WORRIED THAT YOUR FOOD WOULD RUN OUT BEFORE YOU GOT MONEY TO BUY MORE.: NEVER TRUE

## 2024-01-08 SDOH — ECONOMIC STABILITY: INCOME INSECURITY: HOW HARD IS IT FOR YOU TO PAY FOR THE VERY BASICS LIKE FOOD, HOUSING, MEDICAL CARE, AND HEATING?: NOT HARD AT ALL

## 2024-01-08 SDOH — ECONOMIC STABILITY: FOOD INSECURITY: WITHIN THE PAST 12 MONTHS, THE FOOD YOU BOUGHT JUST DIDN'T LAST AND YOU DIDN'T HAVE MONEY TO GET MORE.: NEVER TRUE

## 2024-01-08 ASSESSMENT — ENCOUNTER SYMPTOMS
SINUS PRESSURE: 0
TROUBLE SWALLOWING: 0
RECTAL PAIN: 0
COLOR CHANGE: 0
NAUSEA: 0
EYE REDNESS: 0
SORE THROAT: 0
ABDOMINAL DISTENTION: 1
BLOOD IN STOOL: 0
WHEEZING: 0
COUGH: 0
STRIDOR: 0
CHEST TIGHTNESS: 0
ABDOMINAL PAIN: 0
RHINORRHEA: 0
CONSTIPATION: 0
BACK PAIN: 0
VOMITING: 0
DIARRHEA: 0
SHORTNESS OF BREATH: 0

## 2024-01-08 ASSESSMENT — PATIENT HEALTH QUESTIONNAIRE - PHQ9
5. POOR APPETITE OR OVEREATING: 0
SUM OF ALL RESPONSES TO PHQ9 QUESTIONS 1 & 2: 0
10. IF YOU CHECKED OFF ANY PROBLEMS, HOW DIFFICULT HAVE THESE PROBLEMS MADE IT FOR YOU TO DO YOUR WORK, TAKE CARE OF THINGS AT HOME, OR GET ALONG WITH OTHER PEOPLE: 0
2. FEELING DOWN, DEPRESSED OR HOPELESS: 0
7. TROUBLE CONCENTRATING ON THINGS, SUCH AS READING THE NEWSPAPER OR WATCHING TELEVISION: 0
SUM OF ALL RESPONSES TO PHQ QUESTIONS 1-9: 0
1. LITTLE INTEREST OR PLEASURE IN DOING THINGS: 0
9. THOUGHTS THAT YOU WOULD BE BETTER OFF DEAD, OR OF HURTING YOURSELF: 0
4. FEELING TIRED OR HAVING LITTLE ENERGY: 0
SUM OF ALL RESPONSES TO PHQ QUESTIONS 1-9: 0
3. TROUBLE FALLING OR STAYING ASLEEP: 0
6. FEELING BAD ABOUT YOURSELF - OR THAT YOU ARE A FAILURE OR HAVE LET YOURSELF OR YOUR FAMILY DOWN: 0
8. MOVING OR SPEAKING SO SLOWLY THAT OTHER PEOPLE COULD HAVE NOTICED. OR THE OPPOSITE, BEING SO FIGETY OR RESTLESS THAT YOU HAVE BEEN MOVING AROUND A LOT MORE THAN USUAL: 0

## 2024-01-08 NOTE — PROGRESS NOTES
Chief Complaint   Patient presents with    Hypertension    Hernia     CONCERNS WITH POSSIBLE SIZE CHANGE IN HERNIA/NO PAIN         Chidi Yanez  here today for follow up on chronic medical problems, go over labs and/or diagnostic studies, and medication refills. Hypertension and Hernia (CONCERNS WITH POSSIBLE SIZE CHANGE IN HERNIA/NO PAIN)      HPI: Patient is scheduled for follow-up on chronic medical conditions.      Hypertension, controlled patient is currently on amlodipine 10 mg, reports compliant denies any side effects.    Patient also complains of umbilical hernia has CT abdomen done in April.  Patient reports he thinks it is getting bigger but does not have any pain.  He does not have any nausea vomiting or any bowel issues.  Patient has normal bowel movements.  Denies any constipation.      Erectile dysfunction takes Viagra as needed.    Patient has history of cocaine disorder and alcohol disorder.  Patient reports he was drinking when his mom passed away.  Patient reports he has been sober since last 2 months after he went to rehab.  No recent urine drug screen.  Patient follows with psychiatrist and is on multiple medications.  He was recently started on Abilify in the rehab reports he has not taking.    Patient also discussed with his psychiatrist.      CKD stage II stable on previous blood work.  Patient is not on any NSAIDs.      Vitamin D deficiency improving, is currently on supplements.      Bipolar disorder is on multiple medications including Adderall, and started on Abilify.  Patient reports his depression has been stable.        /78   Pulse 86   Temp 97.6 °F (36.4 °C)   Ht 1.778 m (5' 10\")   Wt 110 kg (242 lb 6.4 oz)   SpO2 96%   BMI 34.78 kg/m²    Body mass index is 34.78 kg/m².  Wt Readings from Last 3 Encounters:   01/08/24 110 kg (242 lb 6.4 oz)   11/24/23 108.9 kg (240 lb)   09/19/23 110.2 kg (243 lb)        [x]Negative depression screening.      1/8/2024    12:17 PM

## 2024-01-08 NOTE — PATIENT INSTRUCTIONS
Thank you for choosing [employer] as your healthcare provider as your care is important to us.  Please arrive at your appointment on time. If you are unable to make your appointment, please call our office as soon as possible so that we may reschedule your appointment.  Missing 3 appointments in a calendar year without notifying the office may lead to dismissal from the practice.  We appreciate you calling at least 24 hours in advance, when possible. Thank you.               New Updates for [employer]    Thank you for choosing [facility] to give you the best care! [facility] [facility] is always trying to think of new ways to help their patients. We are asking all patients to try out the new digital registration that is now available through your [employer] account Via the anya you're now able to update your personal and registration information prior to your upcoming appointment. This will save you time once you arrive at the office to check-in, not to mention your information remains safe!! Many other perks come from signing up for an account, such as:  Requesting refills  Scheduling an appointment  Completing an E-Visit  Sending a message to the office/provider  Having access to your medication list  Paying your bill/copay prior to your appointment  Scheduling your yearly mammogram  Review your test results    If you are not familiar with [facility] please ask one of us and we will be happy to answer any questions or help you set-up your account.      Your [employer] office,

## 2024-01-08 NOTE — PROGRESS NOTES
Visit Information    Have you changed or started any medications since your last visit including any over-the-counter medicines, vitamins, or herbal medicines? no   Have you stopped taking any of your medications? Is so, why? -  no  Are you having any side effects from any of your medications? - no    Have you seen any other physician or provider since your last visit?  yes -    Have you had any other diagnostic tests since your last visit?  YES  Have you been seen in the emergency room and/or had an admission in a hospital since we last saw you?  no   Have you had your routine dental cleaning in the past 6 months?  no     Do you have an active MyChart account? If no, what is the barrier?  Yes    Patient Care Team:  Dain Norwood MD as PCP - General (Family Medicine)  Dain Norwood MD as PCP - Empaneled Provider  Ankit Villatoro MD as Consulting Physician (Hematology and Oncology)  Denia Ricks APRN - CNP as Consulting Physician (Nurse Practitioner)    Medical History Review  Past Medical, Family, and Social History reviewed and does contribute to the patient presenting condition    Health Maintenance   Topic Date Due    Hepatitis B vaccine (1 of 3 - 3-dose series) Never done    Flu vaccine (1) 08/01/2023    COVID-19 Vaccine (6 - 2023-24 season) 09/01/2023    Annual Wellness Visit (Medicare Advantage)  01/01/2024    Depression Monitoring  06/16/2024    A1C test (Diabetic or Prediabetic)  09/15/2024    DTaP/Tdap/Td vaccine (2 - Td or Tdap) 05/29/2028    Lipids  09/15/2028    Colorectal Cancer Screen  04/24/2033    Shingles vaccine  Completed    Hepatitis C screen  Completed    HIV screen  Completed    Hepatitis A vaccine  Aged Out    Hib vaccine  Aged Out    Polio vaccine  Aged Out    Meningococcal (ACWY) vaccine  Aged Out    Pneumococcal 0-64 years Vaccine  Aged Out    Diabetes screen  Discontinued

## 2024-01-10 ENCOUNTER — OFFICE VISIT (OUTPATIENT)
Dept: PULMONOLOGY | Age: 54
End: 2024-01-10
Payer: MEDICARE

## 2024-01-10 VITALS
OXYGEN SATURATION: 98 % | WEIGHT: 244 LBS | HEART RATE: 86 BPM | TEMPERATURE: 98.4 F | DIASTOLIC BLOOD PRESSURE: 84 MMHG | BODY MASS INDEX: 34.93 KG/M2 | SYSTOLIC BLOOD PRESSURE: 130 MMHG | HEIGHT: 70 IN | RESPIRATION RATE: 18 BRPM

## 2024-01-10 DIAGNOSIS — E66.01 SEVERE OBESITY (BMI 35.0-39.9) WITH COMORBIDITY (HCC): ICD-10-CM

## 2024-01-10 DIAGNOSIS — G47.33 OSA ON CPAP: Primary | ICD-10-CM

## 2024-01-10 PROCEDURE — 3017F COLORECTAL CA SCREEN DOC REV: CPT | Performed by: INTERNAL MEDICINE

## 2024-01-10 PROCEDURE — 3079F DIAST BP 80-89 MM HG: CPT | Performed by: INTERNAL MEDICINE

## 2024-01-10 PROCEDURE — G8417 CALC BMI ABV UP PARAM F/U: HCPCS | Performed by: INTERNAL MEDICINE

## 2024-01-10 PROCEDURE — 3075F SYST BP GE 130 - 139MM HG: CPT | Performed by: INTERNAL MEDICINE

## 2024-01-10 PROCEDURE — G8482 FLU IMMUNIZE ORDER/ADMIN: HCPCS | Performed by: INTERNAL MEDICINE

## 2024-01-10 PROCEDURE — G8427 DOCREV CUR MEDS BY ELIG CLIN: HCPCS | Performed by: INTERNAL MEDICINE

## 2024-01-10 PROCEDURE — 1036F TOBACCO NON-USER: CPT | Performed by: INTERNAL MEDICINE

## 2024-01-10 PROCEDURE — 99214 OFFICE O/P EST MOD 30 MIN: CPT | Performed by: INTERNAL MEDICINE

## 2024-01-10 NOTE — PROGRESS NOTES
REASON FOR THE CONSULTATION:  sleep apnea syndrome  Obesity  And hypertension  HISTORY OF PRESENT ILLNESS:    Chidi Yanez has obstructive sleep apnea syndrome that did respond well to the use of CPAP.  Unfortunately has not been using the CPAP.  He needs all the supplies for the CPAP machine he continue to feel sleepy tired fatigue during the daytime.  He is sleeping not very refreshing.  He has gained weight and not been able to lose weight.  His blood pressure has been stable.  No diabetes no pedal edema no thromboembolic process no esophageal flux disease.  Does complain of nasal stuffiness no sinusitis.  No pedal edema no thromboembolic process.  He has systemic hypertension which is well-controlled.  History of depression which is well controlled no suicidal tendency  No diabetes no thyroid dysfunction or any gout.  LUNG CANCER SCREENING     CRITERIA MET    []     CT ORDERED  []      CRITERIA NOT MET   [x]      REFUSED                    []        REASON CRITERIA NOT MET     SMOKING LESS THAN 30 PY  []      AGE LESS THAN 55 or GREATER 77 YEARS  []      QUIT SMOKING 15 YEARS OR GREATER   []      RECENT CT WITH IN 11 MONTHS    []      LIFE EXPECTANCY < 5 YEARS   []      SIGNS  AND SYMPTOMS OF LUNG CANCER   []         Immunization   Immunization History   Administered Date(s) Administered    COVID-19, [facility], Primary or Immunocompromised, (age 12y+), IM, 100 mcg/0.5mL 03/29/2021, 04/26/2021, 11/16/2021    COVID-19,  Vaccine, Vaccine Unspecified 03/29/2021, 04/26/2021    Influenza Virus Vaccine 11/01/2011    Influenza, AFLURIA (age 3 yrs+), FLUZONE, (age 6 mo+), MDV, 0.5mL 11/01/2011, 09/18/2019    Influenza, FLUARIX, FLULAVAL, FLUZONE (age 6 mo+) AND AFLURIA, (age 3 y+), PF, 0.5mL 09/14/2018    Influenza, FLUCELVAX, (age 6 mo+), MDCK, PF, 0.5mL 02/14/2022, 09/19/2022, 01/08/2024    TDaP, ADACEL (age 10y-64y), BOOSTRIX (age 10y+), IM, 0.5mL 05/29/2018    Td vaccine (adult) 02/15/2003

## 2024-01-11 ENCOUNTER — CLINICAL DOCUMENTATION (OUTPATIENT)
Dept: PULMONOLOGY | Age: 54
End: 2024-01-11

## 2024-01-11 ENCOUNTER — CLINICAL DOCUMENTATION (OUTPATIENT)
Dept: INFECTIOUS DISEASES | Age: 54
End: 2024-01-11

## 2024-01-28 ENCOUNTER — E-VISIT (OUTPATIENT)
Dept: FAMILY MEDICINE CLINIC | Age: 54
End: 2024-01-28
Payer: MEDICARE

## 2024-01-28 DIAGNOSIS — J40 BRONCHITIS: Primary | ICD-10-CM

## 2024-01-28 PROCEDURE — 99423 OL DIG E/M SVC 21+ MIN: CPT | Performed by: FAMILY MEDICINE

## 2024-01-30 DIAGNOSIS — T78.40XA WHEEZING DUE TO ALLERGY: ICD-10-CM

## 2024-01-30 DIAGNOSIS — J40 BRONCHITIS: ICD-10-CM

## 2024-01-30 DIAGNOSIS — R06.2 WHEEZING DUE TO ALLERGY: ICD-10-CM

## 2024-01-30 RX ORDER — METHYLPREDNISOLONE 4 MG/1
TABLET ORAL
Qty: 1 KIT | Refills: 0 | Status: SHIPPED | OUTPATIENT
Start: 2024-01-30 | End: 2024-02-05

## 2024-01-30 RX ORDER — BUDESONIDE AND FORMOTEROL FUMARATE DIHYDRATE 80; 4.5 UG/1; UG/1
2 AEROSOL RESPIRATORY (INHALATION) 2 TIMES DAILY
Qty: 1 EACH | Refills: 0 | Status: SHIPPED | OUTPATIENT
Start: 2024-01-30

## 2024-01-30 RX ORDER — ALBUTEROL SULFATE 2.5 MG/3ML
2.5 SOLUTION RESPIRATORY (INHALATION) EVERY 6 HOURS PRN
Qty: 125 EACH | Refills: 0 | Status: SHIPPED | OUTPATIENT
Start: 2024-01-30

## 2024-01-30 ASSESSMENT — LIFESTYLE VARIABLES
SMOKING_STATUS: NO, BUT I USED TO SMOKE
SMOKING_YEARS: 20
PACKS_PER_DAY: 1

## 2024-01-30 NOTE — PROGRESS NOTES
Chidi Yanez (1970) initiated an asynchronous digital communication through [email or web address].  Date of service: 1/30/2024     HPI: per patient's questionnaire    EXAM: not applicable    Diagnoses and all orders for this visit:    1. Bronchitis    - Nebulizers (COMPRESSOR/NEBULIZER) MISC; Nebulizer with compressor. Disposable Med Nebs 2 /month. Reusable Med Nebs 1 per 6 months.Aerosol Mask 1 per month. Replacement Filters 2 per month. All other related supplies as needed per month. Medications being used:Albuterol .083 unit dose vial. Frequency: every 6 hrs x 4/day .Length of Need: 1  Dispense: 1 each; Refill: 3  - albuterol (PROVENTIL) (2.5 MG/3ML) 0.083% nebulizer solution; Take 3 mLs by nebulization every 6 hours as needed for Wheezing or Shortness of Breath  Dispense: 125 each; Refill: 0  - methylPREDNISolone (MEDROL DOSEPACK) 4 MG tablet; Take by mouth.  Dispense: 1 kit; Refill: 0  - budesonide-formoterol (SYMBICORT) 80-4.5 MCG/ACT AERO; Inhale 2 puffs into the lungs 2 times daily  Dispense: 1 each; Refill: 0      No orders of the defined types were placed in this encounter.        Patient was advised to contact PCP if symptoms worsen or failing to change as expected      Time: EV3 - 21 or more minutes were spent on the digital evaluation and management of this patient.    Electronically signed by Dain Norwood MD on 1/30/24 at 11:59 AM.

## 2024-01-31 RX ORDER — ALBUTEROL SULFATE 90 UG/1
2 AEROSOL, METERED RESPIRATORY (INHALATION) 4 TIMES DAILY PRN
Qty: 18 G | Refills: 0 | Status: SHIPPED | OUTPATIENT
Start: 2024-01-31

## 2024-01-31 NOTE — TELEPHONE ENCOUNTER
Please Approve or Refuse.  Send to Pharmacy per Pt's Request:      Next Visit Date:  1/30/2024   Last Visit Date: 1/28/2024    Hemoglobin A1C (%)   Date Value   09/15/2023 5.4   04/26/2023 5.5   01/23/2023 5.9             ( goal A1C is < 7)   BP Readings from Last 3 Encounters:   01/10/24 130/84   01/08/24 132/78   11/24/23 (!) 147/92          (goal 120/80)  BUN   Date Value Ref Range Status   11/24/2023 8 6 - 20 mg/dL Final     Creatinine   Date Value Ref Range Status   11/24/2023 1.2 0.7 - 1.2 mg/dL Final     Potassium   Date Value Ref Range Status   11/24/2023 4.0 3.7 - 5.3 mmol/L Final

## 2024-01-31 NOTE — TELEPHONE ENCOUNTER
Please Approve or Refuse.  Send to Pharmacy per Pt's Request:      Next Visit Date:  5/8/2024   Last Visit Date: 1/28/2024    Hemoglobin A1C (%)   Date Value   09/15/2023 5.4   04/26/2023 5.5   01/23/2023 5.9             ( goal A1C is < 7)   BP Readings from Last 3 Encounters:   01/10/24 130/84   01/08/24 132/78   11/24/23 (!) 147/92          (goal 120/80)  BUN   Date Value Ref Range Status   11/24/2023 8 6 - 20 mg/dL Final     Creatinine   Date Value Ref Range Status   11/24/2023 1.2 0.7 - 1.2 mg/dL Final     Potassium   Date Value Ref Range Status   11/24/2023 4.0 3.7 - 5.3 mmol/L Final

## 2024-02-01 RX ORDER — AZITHROMYCIN 250 MG/1
TABLET, FILM COATED ORAL
Qty: 6 TABLET | Refills: 0 | Status: SHIPPED | OUTPATIENT
Start: 2024-02-01 | End: 2024-02-06

## 2024-02-01 NOTE — PROGRESS NOTES
Asking for [facility] [place]

## 2024-02-19 DIAGNOSIS — J40 BRONCHITIS: ICD-10-CM

## 2024-02-19 RX ORDER — ALBUTEROL SULFATE 2.5 MG/3ML
SOLUTION RESPIRATORY (INHALATION)
Qty: 150 ML | Refills: 0 | Status: SHIPPED | OUTPATIENT
Start: 2024-02-19 | End: 2024-02-20

## 2024-02-19 NOTE — TELEPHONE ENCOUNTER
Please Approve or Refuse.  Send to Pharmacy per Pt's Request: [facility]     Next Visit Date:  5/8/2024   Last Visit Date: 1/28/2024    Hemoglobin A1C (%)   Date Value   09/15/2023 5.4   04/26/2023 5.5   01/23/2023 5.9             ( goal A1C is < 7)   BP Readings from Last 3 Encounters:   01/10/24 130/84   01/08/24 132/78   11/24/23 (!) 147/92          (goal 120/80)  BUN   Date Value Ref Range Status   11/24/2023 8 6 - 20 mg/dL Final     Creatinine   Date Value Ref Range Status   11/24/2023 1.2 0.7 - 1.2 mg/dL Final     Potassium   Date Value Ref Range Status   11/24/2023 4.0 3.7 - 5.3 mmol/L Final

## 2024-02-20 DIAGNOSIS — J40 BRONCHITIS: ICD-10-CM

## 2024-02-20 RX ORDER — ALBUTEROL SULFATE 2.5 MG/3ML
SOLUTION RESPIRATORY (INHALATION)
Qty: 150 ML | Refills: 0 | Status: SHIPPED | OUTPATIENT
Start: 2024-02-20

## 2024-02-20 RX ORDER — ALBUTEROL SULFATE 2.5 MG/3ML
SOLUTION RESPIRATORY (INHALATION)
Qty: 150 ML | Refills: 0 | OUTPATIENT
Start: 2024-02-20

## 2024-03-02 DIAGNOSIS — N52.9 ERECTILE DYSFUNCTION, UNSPECIFIED ERECTILE DYSFUNCTION TYPE: ICD-10-CM

## 2024-03-04 RX ORDER — SILDENAFIL 100 MG/1
TABLET, FILM COATED ORAL
Qty: 60 TABLET | Refills: 0 | Status: SHIPPED | OUTPATIENT
Start: 2024-03-04

## 2024-03-08 DIAGNOSIS — J40 BRONCHITIS: ICD-10-CM

## 2024-03-08 RX ORDER — DILTIAZEM HYDROCHLORIDE 60 MG/1
2 TABLET, FILM COATED ORAL 2 TIMES DAILY
Qty: 10.2 G | Refills: 0 | Status: SHIPPED | OUTPATIENT
Start: 2024-03-08 | End: 2024-03-08

## 2024-03-08 RX ORDER — DILTIAZEM HYDROCHLORIDE 60 MG/1
2 TABLET, FILM COATED ORAL 2 TIMES DAILY
Qty: 10.2 G | Refills: 0 | Status: SHIPPED | OUTPATIENT
Start: 2024-03-08

## 2024-03-11 RX ORDER — DILTIAZEM HYDROCHLORIDE 60 MG/1
2 TABLET, FILM COATED ORAL 2 TIMES DAILY
Qty: 10.2 G | Refills: 0 | Status: SHIPPED | OUTPATIENT
Start: 2024-03-11

## 2024-05-31 ENCOUNTER — OFFICE VISIT (OUTPATIENT)
Dept: FAMILY MEDICINE CLINIC | Age: 54
End: 2024-05-31

## 2024-05-31 VITALS
DIASTOLIC BLOOD PRESSURE: 88 MMHG | OXYGEN SATURATION: 98 % | BODY MASS INDEX: 36.65 KG/M2 | HEART RATE: 96 BPM | HEIGHT: 70 IN | WEIGHT: 256 LBS | SYSTOLIC BLOOD PRESSURE: 130 MMHG

## 2024-05-31 DIAGNOSIS — Z12.5 PROSTATE CANCER SCREENING: ICD-10-CM

## 2024-05-31 DIAGNOSIS — N50.89 TESTICLE SWELLING: ICD-10-CM

## 2024-05-31 DIAGNOSIS — R73.03 PREDIABETES: ICD-10-CM

## 2024-05-31 DIAGNOSIS — Z00.00 MEDICARE ANNUAL WELLNESS VISIT, SUBSEQUENT: Primary | ICD-10-CM

## 2024-05-31 DIAGNOSIS — E66.09 CLASS 1 OBESITY DUE TO EXCESS CALORIES WITH SERIOUS COMORBIDITY AND BODY MASS INDEX (BMI) OF 31.0 TO 31.9 IN ADULT: ICD-10-CM

## 2024-05-31 DIAGNOSIS — N45.1 ACUTE EPIDIDYMITIS: ICD-10-CM

## 2024-05-31 DIAGNOSIS — N18.2 STAGE 2 CHRONIC KIDNEY DISEASE: ICD-10-CM

## 2024-05-31 DIAGNOSIS — R35.1 BENIGN PROSTATIC HYPERPLASIA WITH NOCTURIA: ICD-10-CM

## 2024-05-31 DIAGNOSIS — N40.1 BENIGN PROSTATIC HYPERPLASIA WITH NOCTURIA: ICD-10-CM

## 2024-05-31 DIAGNOSIS — I10 ESSENTIAL HYPERTENSION: ICD-10-CM

## 2024-05-31 DIAGNOSIS — Z71.89 ACP (ADVANCE CARE PLANNING): ICD-10-CM

## 2024-05-31 DIAGNOSIS — N52.9 ERECTILE DYSFUNCTION, UNSPECIFIED ERECTILE DYSFUNCTION TYPE: ICD-10-CM

## 2024-05-31 DIAGNOSIS — E55.9 VITAMIN D DEFICIENCY: ICD-10-CM

## 2024-05-31 PROBLEM — R30.0 DYSURIA: Status: ACTIVE | Noted: 2024-05-31

## 2024-05-31 PROBLEM — J40 BRONCHITIS: Status: RESOLVED | Noted: 2024-01-30 | Resolved: 2024-05-31

## 2024-05-31 PROBLEM — F14.20 COCAINE USE DISORDER, SEVERE, DEPENDENCE (HCC): Status: RESOLVED | Noted: 2023-07-21 | Resolved: 2024-05-31

## 2024-05-31 PROBLEM — R30.0 DYSURIA: Status: RESOLVED | Noted: 2024-05-31 | Resolved: 2024-05-31

## 2024-05-31 RX ORDER — SILDENAFIL 100 MG/1
TABLET, FILM COATED ORAL
Qty: 60 TABLET | Refills: 0 | Status: SHIPPED | OUTPATIENT
Start: 2024-05-31

## 2024-05-31 RX ORDER — TAMSULOSIN HYDROCHLORIDE 0.4 MG/1
CAPSULE ORAL
Qty: 90 CAPSULE | Refills: 1 | Status: SHIPPED | OUTPATIENT
Start: 2024-05-31

## 2024-05-31 RX ORDER — AMLODIPINE BESYLATE 10 MG/1
TABLET ORAL
Qty: 90 TABLET | Refills: 5 | Status: SHIPPED | OUTPATIENT
Start: 2024-05-31

## 2024-05-31 SDOH — ECONOMIC STABILITY: FOOD INSECURITY: WITHIN THE PAST 12 MONTHS, YOU WORRIED THAT YOUR FOOD WOULD RUN OUT BEFORE YOU GOT MONEY TO BUY MORE.: NEVER TRUE

## 2024-05-31 SDOH — ECONOMIC STABILITY: INCOME INSECURITY: HOW HARD IS IT FOR YOU TO PAY FOR THE VERY BASICS LIKE FOOD, HOUSING, MEDICAL CARE, AND HEATING?: NOT HARD AT ALL

## 2024-05-31 SDOH — ECONOMIC STABILITY: FOOD INSECURITY: WITHIN THE PAST 12 MONTHS, THE FOOD YOU BOUGHT JUST DIDN'T LAST AND YOU DIDN'T HAVE MONEY TO GET MORE.: NEVER TRUE

## 2024-05-31 ASSESSMENT — ENCOUNTER SYMPTOMS
VOMITING: 0
RECTAL PAIN: 0
STRIDOR: 0
EYE REDNESS: 0
ABDOMINAL DISTENTION: 0
SHORTNESS OF BREATH: 0
BACK PAIN: 0
WHEEZING: 0
CONSTIPATION: 0
RHINORRHEA: 0
DIARRHEA: 0
COLOR CHANGE: 0
ABDOMINAL PAIN: 0
TROUBLE SWALLOWING: 0
NAUSEA: 0
CHEST TIGHTNESS: 0
SINUS PRESSURE: 0
SORE THROAT: 0
BLOOD IN STOOL: 0
COUGH: 0

## 2024-05-31 ASSESSMENT — PATIENT HEALTH QUESTIONNAIRE - PHQ9
SUM OF ALL RESPONSES TO PHQ QUESTIONS 1-9: 1
6. FEELING BAD ABOUT YOURSELF - OR THAT YOU ARE A FAILURE OR HAVE LET YOURSELF OR YOUR FAMILY DOWN: NOT AT ALL
SUM OF ALL RESPONSES TO PHQ QUESTIONS 1-9: 0
SUM OF ALL RESPONSES TO PHQ QUESTIONS 1-9: 0
SUM OF ALL RESPONSES TO PHQ9 QUESTIONS 1 & 2: 1
SUM OF ALL RESPONSES TO PHQ QUESTIONS 1-9: 0
5. POOR APPETITE OR OVEREATING: NOT AT ALL
SUM OF ALL RESPONSES TO PHQ QUESTIONS 1-9: 1
2. FEELING DOWN, DEPRESSED OR HOPELESS: NOT AT ALL
7. TROUBLE CONCENTRATING ON THINGS, SUCH AS READING THE NEWSPAPER OR WATCHING TELEVISION: NOT AT ALL
SUM OF ALL RESPONSES TO PHQ QUESTIONS 1-9: 0
9. THOUGHTS THAT YOU WOULD BE BETTER OFF DEAD, OR OF HURTING YOURSELF: NOT AT ALL
2. FEELING DOWN, DEPRESSED OR HOPELESS: SEVERAL DAYS
1. LITTLE INTEREST OR PLEASURE IN DOING THINGS: NOT AT ALL
SUM OF ALL RESPONSES TO PHQ QUESTIONS 1-9: 1
10. IF YOU CHECKED OFF ANY PROBLEMS, HOW DIFFICULT HAVE THESE PROBLEMS MADE IT FOR YOU TO DO YOUR WORK, TAKE CARE OF THINGS AT HOME, OR GET ALONG WITH OTHER PEOPLE: NOT DIFFICULT AT ALL
3. TROUBLE FALLING OR STAYING ASLEEP: NOT AT ALL
8. MOVING OR SPEAKING SO SLOWLY THAT OTHER PEOPLE COULD HAVE NOTICED. OR THE OPPOSITE, BEING SO FIGETY OR RESTLESS THAT YOU HAVE BEEN MOVING AROUND A LOT MORE THAN USUAL: NOT AT ALL
SUM OF ALL RESPONSES TO PHQ QUESTIONS 1-9: 1

## 2024-05-31 ASSESSMENT — LIFESTYLE VARIABLES
HOW OFTEN DO YOU HAVE A DRINK CONTAINING ALCOHOL: 2-4 TIMES A MONTH
HOW MANY STANDARD DRINKS CONTAINING ALCOHOL DO YOU HAVE ON A TYPICAL DAY: 1 OR 2

## 2024-05-31 NOTE — PROGRESS NOTES
Visit Information    Have you changed or started any medications since your last visit including any over-the-counter medicines, vitamins, or herbal medicines? no   Are you having any side effects from any of your medications? -  no  Have you stopped taking any of your medications? Is so, why? -  no    Have you seen any other physician or provider since your last visit? No  Have you had any other diagnostic tests since your last visit? No  Have you been seen in the emergency room and/or had an admission to a hospital since we last saw you? No  Have you had your routine dental cleaning in the past 6 months?     Have you activated your [employer] account? If not, what are your barriers? Yes     Patient Care Team:  Dain Norwood MD as PCP - General (Family Medicine)  Dain Norwood MD as PCP - Empaneled Provider  Ankit Villatoro MD as Consulting Physician (Hematology and Oncology)  Denia Ricks APRN - CNP as Consulting Physician (Nurse Practitioner)    Medical History Review  Past Medical, Family, and Social History reviewed and does contribute to the patient presenting condition    Health Maintenance   Topic Date Due    Hepatitis B vaccine (1 of 3 - 3-dose series) Never done    COVID-19 Vaccine (6 - 2023-24 season) 09/01/2023    Annual Wellness Visit (Medicare Advantage)  01/01/2024    A1C test (Diabetic or Prediabetic)  09/15/2024    Depression Monitoring  01/08/2025    DTaP/Tdap/Td vaccine (2 - Td or Tdap) 05/29/2028    Lipids  09/15/2028    Colorectal Cancer Screen  04/24/2033    Flu vaccine  Completed    Shingles vaccine  Completed    Hepatitis C screen  Completed    HIV screen  Completed    Hepatitis A vaccine  Aged Out    Hib vaccine  Aged Out    Polio vaccine  Aged Out    Meningococcal (ACWY) vaccine  Aged Out    Pneumococcal 0-64 years Vaccine  Aged Out    Diabetes screen  Discontinued     
(See Comments)     Wheezing       Lisinopril     Nsaids Itching     Tolerates baby asa    Pcn [Penicillins] Swelling     Swelling,hives, itching and shortness of breath    Shellfish-Derived Products     Shrimp Extract      Prior to Visit Medications    Medication Sig Taking? Authorizing Provider   sildenafil (VIAGRA) 100 MG tablet TAKE 1 TABLET BY MOUTH ONCE A DAY AS NEEDED FOR ERECTILE DYSFUNCTION Yes Dain Norwood MD   amLODIPine (NORVASC) 10 MG tablet TAKE 1 TABLET BY MOUTH EVERY DAY Yes Dain Norwood MD   tamsulosin (FLOMAX) 0.4 MG capsule TAKE 1 CAPSULE BY MOUTH EVERY DAY Yes Dain Norwood MD   albuterol (PROVENTIL) (2.5 MG/3ML) 0.083% nebulizer solution inhale contents of 1 vial by nebulization every 6 hours as needed for Wheezing or Shortness of Breath Yes Dain Norwood MD   albuterol sulfate HFA (VENTOLIN HFA) 108 (90 Base) MCG/ACT inhaler Inhale 2 puffs into the lungs 4 times daily as needed for Wheezing Yes Dain Norwood MD   Nebulizers (COMPRESSOR/NEBULIZER) MISC Nebulizer with compressor. Disposable Med Nebs 2 /month. Reusable Med Nebs 1 per 6 months.Aerosol Mask 1 per month. Replacement Filters 2 per month. All other related supplies as needed per month. Medications being used:Albuterol .083 unit dose vial. Frequency: every 6 hrs x 4/day .Length of Need: 1 Yes Dain Norwood MD   EPINEPHrine (EPIPEN 2-LAURA) 0.3 MG/0.3ML SOAJ injection Use only as needed during anaphylaxis Yes Dain Norwood MD   amphetamine-dextroamphetamine (ADDERALL XR) 30 MG extended release capsule  Yes ProviderHolger MD   valACYclovir (VALTREX) 500 MG tablet TAKE 1 TABLET BY MOUTH ONE TIME A DAY. start within 72 hours of symptom onset Yes Dain Norwood MD       [facility] (Including outside providers/suppliers regularly involved in providing care):   Patient Care Team:  Dain Norwood MD as PCP - General (Family Medicine)  Dain Norwood MD as PCP - Empaneled Provider  Ankit Villatoro MD as Consulting Physician

## 2024-09-23 DIAGNOSIS — N52.9 ERECTILE DYSFUNCTION, UNSPECIFIED ERECTILE DYSFUNCTION TYPE: ICD-10-CM

## 2024-09-23 RX ORDER — SILDENAFIL 100 MG/1
TABLET, FILM COATED ORAL
Qty: 60 TABLET | Refills: 0 | Status: SHIPPED | OUTPATIENT
Start: 2024-09-23

## 2024-11-04 ENCOUNTER — HOSPITAL ENCOUNTER (EMERGENCY)
Age: 54
Discharge: LEFT BEFORE DISCHARGE | End: 2024-11-04
Attending: EMERGENCY MEDICINE
Payer: MEDICARE

## 2024-11-04 VITALS
OXYGEN SATURATION: 96 % | HEART RATE: 98 BPM | TEMPERATURE: 98.8 F | RESPIRATION RATE: 16 BRPM | SYSTOLIC BLOOD PRESSURE: 158 MMHG | DIASTOLIC BLOOD PRESSURE: 79 MMHG

## 2024-11-04 DIAGNOSIS — A64 STI (SEXUALLY TRANSMITTED INFECTION): Primary | ICD-10-CM

## 2024-11-04 LAB
BACTERIA URNS QL MICRO: ABNORMAL
BILIRUB UR QL STRIP: NEGATIVE
CASTS #/AREA URNS LPF: ABNORMAL /LPF (ref 0–8)
CHLAMYDIA DNA UR QL NAA+PROBE: NEGATIVE
CLARITY UR: CLEAR
COLOR UR: YELLOW
EPI CELLS #/AREA URNS HPF: ABNORMAL /HPF (ref 0–5)
GLUCOSE UR STRIP-MCNC: NEGATIVE MG/DL
HGB UR QL STRIP.AUTO: ABNORMAL
KETONES UR STRIP-MCNC: NEGATIVE MG/DL
LEUKOCYTE ESTERASE UR QL STRIP: NEGATIVE
N GONORRHOEA DNA UR QL NAA+PROBE: NEGATIVE
NITRITE UR QL STRIP: NEGATIVE
PH UR STRIP: 5.5 [PH] (ref 5–8)
PROT UR STRIP-MCNC: NEGATIVE MG/DL
RBC #/AREA URNS HPF: ABNORMAL /HPF (ref 0–4)
SP GR UR STRIP: 1.01 (ref 1–1.03)
SPECIMEN DESCRIPTION: NORMAL
UROBILINOGEN UR STRIP-ACNC: NORMAL EU/DL (ref 0–1)
WBC #/AREA URNS HPF: ABNORMAL /HPF (ref 0–5)

## 2024-11-04 PROCEDURE — 87491 CHLMYD TRACH DNA AMP PROBE: CPT

## 2024-11-04 PROCEDURE — 99283 EMERGENCY DEPT VISIT LOW MDM: CPT | Performed by: EMERGENCY MEDICINE

## 2024-11-04 PROCEDURE — 87591 N.GONORRHOEAE DNA AMP PROB: CPT

## 2024-11-04 PROCEDURE — 81001 URINALYSIS AUTO W/SCOPE: CPT

## 2024-11-04 ASSESSMENT — PAIN - FUNCTIONAL ASSESSMENT: PAIN_FUNCTIONAL_ASSESSMENT: 0-10

## 2024-11-04 ASSESSMENT — PAIN SCALES - GENERAL: PAINLEVEL_OUTOF10: 0

## 2024-11-04 NOTE — ED NOTES
Pt arrived to ED for STD check  Pt states he was having intercourse with his girlfriend and there was a \"fishy odor\" so his girlfriend wanted to come get checked for STD's  Pt denies having any discharge, pain, or urinary issues  Pt has no other complaints at this time

## 2024-11-04 NOTE — ED PROVIDER NOTES
[facility] ED     Emergency Department     Faculty Attestation    I performed a history and physical examination of the patient and discussed management with the resident. I reviewed the resident’s note and agree with the documented findings and plan of care. Any areas of disagreement are noted on the chart. I was personally present for the key portions of any procedures. I have documented in the chart those procedures where I was not present during the key portions. I have reviewed the emergency nurses triage note. I agree with the chief complaint, past medical history, past surgical history, allergies, medications, social and family history as documented unless otherwise noted below. For Physician Assistant/ Nurse Practitioner cases/documentation I have personally evaluated this patient and have completed at least one if not all key elements of the E/M (history, physical exam, and MDM). Additional findings are as noted.    Note Started: 2:19 AM EST    STD concerns new sexual contacts at night unprotected states partner had \"fishy odor and discharge.  Patient has no symptoms no recent exposures.  No complaints.  Discussed empiric treatment versus waiting till results come back he does have MyChart I would prefer to wait we will follow-up      Critical Care     none    Bharath Montano MD, FACEP, FAAEM  Attending Emergency  Physician           Bharath Montaon MD  11/04/24 [id number]

## 2024-11-21 ENCOUNTER — TELEPHONE (OUTPATIENT)
Dept: FAMILY MEDICINE CLINIC | Age: 54
End: 2024-11-21

## 2024-11-21 NOTE — TELEPHONE ENCOUNTER
----- Message from Brisa SEYMOUR sent at 11/14/2024 10:37 AM EST -----  Regarding: ECC Escalation To Practice  ECC Escalation To Practice      Type of Escalation: Acute Care Symptom  --------------------------------------------------------------------------------------------------------------------------    Information for Provider:  Patient is looking for appointment for: Symptom: Cough/Cold/Flu  Reasons for Message: Unable to reach practice     Additional Information: The patient is experiencing Cough with a lot of phlegm and blurry vision for about a week  --------------------------------------------------------------------------------------------------------------------------    Relationship to Patient: Self     Call Back Info: OK to leave message on voicemail  Preferred Call Back Number: Phone [phone] (home)

## 2024-11-21 NOTE — TELEPHONE ENCOUNTER
11/21/2024  Patient is MY-CHART STATUS ACTIVE.    A My-Chart message has been sent out to patient. Per Provider request to deliver message to patient in regard to routine appt. .    Recent Visits  Date Type Provider Dept   05/31/24 Office Visit Dain Norwood MD [facility]   01/08/24 Office Visit Dain Norwood MD [facility]   09/19/23 Office Visit [facility] MD [facility]   06/16/23 Office Visit Raphael Liu APRN - CNP [facility]   Showing recent visits within past 540 days with a meds authorizing provider and meeting all other requirements  Future Appointments  No visits were found meeting these conditions.  Showing future appointments within next 150 days with a meds authorizing provider and meeting all other requirements

## 2024-12-20 ENCOUNTER — TELEMEDICINE (OUTPATIENT)
Dept: FAMILY MEDICINE CLINIC | Age: 54
End: 2024-12-20

## 2024-12-20 ENCOUNTER — HOSPITAL ENCOUNTER (OUTPATIENT)
Age: 54
Discharge: HOME | End: 2024-12-20
Payer: MEDICARE

## 2024-12-20 DIAGNOSIS — Z20.2 STD EXPOSURE: ICD-10-CM

## 2024-12-20 DIAGNOSIS — N18.2 STAGE 2 CHRONIC KIDNEY DISEASE: ICD-10-CM

## 2024-12-20 DIAGNOSIS — H53.9 VISION CHANGES: ICD-10-CM

## 2024-12-20 DIAGNOSIS — Z23 ENCOUNTER FOR IMMUNIZATION: ICD-10-CM

## 2024-12-20 DIAGNOSIS — Z11.59 ENCOUNTER FOR SCREENING FOR OTHER VIRAL DISEASES: ICD-10-CM

## 2024-12-20 DIAGNOSIS — G47.33 OSA ON CPAP: ICD-10-CM

## 2024-12-20 DIAGNOSIS — N40.1 BENIGN PROSTATIC HYPERPLASIA WITH NOCTURIA: ICD-10-CM

## 2024-12-20 DIAGNOSIS — R35.1 BENIGN PROSTATIC HYPERPLASIA WITH NOCTURIA: ICD-10-CM

## 2024-12-20 DIAGNOSIS — R73.03 PREDIABETES: ICD-10-CM

## 2024-12-20 DIAGNOSIS — R76.8 ANA POSITIVE: ICD-10-CM

## 2024-12-20 DIAGNOSIS — A60.02 HERPES GENITALIS IN MEN: ICD-10-CM

## 2024-12-20 DIAGNOSIS — I10 ESSENTIAL HYPERTENSION: Primary | ICD-10-CM

## 2024-12-20 LAB
HIV 1+2 AB+HIV1 P24 AG SERPL QL IA: NONREACTIVE
T PALLIDUM AB SER QL IA: NONREACTIVE

## 2024-12-20 PROCEDURE — 86225 DNA ANTIBODY NATIVE: CPT

## 2024-12-20 PROCEDURE — 86038 ANTINUCLEAR ANTIBODIES: CPT

## 2024-12-20 PROCEDURE — 86780 TREPONEMA PALLIDUM: CPT

## 2024-12-20 PROCEDURE — 36415 COLL VENOUS BLD VENIPUNCTURE: CPT

## 2024-12-20 PROCEDURE — 87389 HIV-1 AG W/HIV-1&-2 AB AG IA: CPT

## 2024-12-20 RX ORDER — CARVEDILOL 3.12 MG/1
3.12 TABLET ORAL 2 TIMES DAILY
Qty: 60 TABLET | Refills: 3 | Status: SHIPPED | OUTPATIENT
Start: 2024-12-20

## 2024-12-20 ASSESSMENT — ENCOUNTER SYMPTOMS
VOMITING: 0
BLOOD IN STOOL: 0
SHORTNESS OF BREATH: 0
WHEEZING: 0
COLOR CHANGE: 0
DIARRHEA: 0
BACK PAIN: 0
CHEST TIGHTNESS: 0
RHINORRHEA: 0
SINUS PRESSURE: 0
COUGH: 0
TROUBLE SWALLOWING: 0
RECTAL PAIN: 0
NAUSEA: 0
STRIDOR: 0
SORE THROAT: 0
ABDOMINAL PAIN: 0
ABDOMINAL DISTENTION: 0
EYE REDNESS: 0
CONSTIPATION: 0

## 2024-12-20 NOTE — ASSESSMENT & PLAN NOTE
Chronic, not at goal (unstable), not under control, continue to be started on Coreg 3.125 dose.  Follow-up in office in 2 months.  Labs reprinted discussed with patient to do blood work.    Orders:    carvedilol (COREG) 3.125 MG tablet; Take 1 tablet by mouth 2 times daily

## 2024-12-20 NOTE — PROGRESS NOTES
Chidi Yanez, was evaluated through a synchronous (real-time) audio-video encounter. The patient (or guardian if applicable) is aware that this is a billable service, which includes applicable co-pays. This Virtual Visit was conducted with patient's (and/or legal guardian's) consent. Patient identification was verified, and a caregiver was present when appropriate.   The patient was located at Home: [address] [place] [id number]  Provider was located at Home (Appt Dept State): [place]  Confirm you are appropriately licensed, registered, or certified to deliver care in the state where the patient is located as indicated above. If you are not or unsure, please re-schedule the visit: Yes, I confirm.     Chidi Yanez (:  1970) is a Established patient, presenting virtually for evaluation of the following:      Below is the assessment and plan developed based on review of pertinent history, physical exam, labs, studies, and medications.     Assessment & Plan  Essential hypertension   Chronic, not at goal (unstable), not under control, continue to be started on Coreg 3.125 dose.  Follow-up in office in 2 months.  Labs reprinted discussed with patient to do blood work.    Orders:    carvedilol (COREG) 3.125 MG tablet; Take 1 tablet by mouth 2 times daily    Herpes genitalis in men   Chronic, at goal (stable), continue current treatment plan         Benign prostatic hyperplasia with nocturia   Chronic, at goal (stable), continue current treatment plan         TATE on CPAP    Stable, continues in CPAP continue weight reduction.         KRISTI positive    Previously positive repeat KRISTI    Orders:    KRISTI Screen With Reflex; Future    Prediabetes    Patient is currently on diet control recheck A1c labs are ordered         Stage 2 chronic kidney disease    Fairly stable, recheck blood work discussed with patient to do blood work         Vision changes    New changes, patient has appointment scheduled with

## 2024-12-23 LAB
ANA SER QL IA: NEGATIVE
DSDNA IGG SER QL IA: 2.4 IU/ML
NUCLEAR IGG SER IA-RTO: 0.3 U/ML

## 2025-01-23 DIAGNOSIS — R35.1 BENIGN PROSTATIC HYPERPLASIA WITH NOCTURIA: ICD-10-CM

## 2025-01-23 DIAGNOSIS — N40.1 BENIGN PROSTATIC HYPERPLASIA WITH NOCTURIA: ICD-10-CM

## 2025-01-23 RX ORDER — TAMSULOSIN HYDROCHLORIDE 0.4 MG/1
CAPSULE ORAL
Qty: 90 CAPSULE | Refills: 0 | Status: SHIPPED | OUTPATIENT
Start: 2025-01-23

## 2025-02-12 ENCOUNTER — TELEPHONE (OUTPATIENT)
Dept: FAMILY MEDICINE CLINIC | Age: 55
End: 2025-02-12

## 2025-02-12 NOTE — TELEPHONE ENCOUNTER
Pt called in requesting medication for a Bacterial infection , pt stated his testicle is swollen and he wants some antibiotics called in if possible

## 2025-02-12 NOTE — TELEPHONE ENCOUNTER
Please notify patient to submit e-visit.  Would not prescribe any antibiotics without a visit.  He has to write down the details of the infection.

## 2025-02-15 DIAGNOSIS — N40.1 BENIGN PROSTATIC HYPERPLASIA WITH NOCTURIA: ICD-10-CM

## 2025-02-15 DIAGNOSIS — R35.1 BENIGN PROSTATIC HYPERPLASIA WITH NOCTURIA: ICD-10-CM

## 2025-02-17 RX ORDER — TAMSULOSIN HYDROCHLORIDE 0.4 MG/1
CAPSULE ORAL
Qty: 90 CAPSULE | Refills: 0 | Status: SHIPPED | OUTPATIENT
Start: 2025-02-17

## 2025-02-17 NOTE — TELEPHONE ENCOUNTER
Please Approve or Refuse.  Send to Pharmacy per Pt's Request: [facility]      Next Visit Date:  2/20/2025   Last Visit Date: 12/20/2024    Hemoglobin A1C (%)   Date Value   09/15/2023 5.4   04/26/2023 5.5   01/23/2023 5.9             ( goal A1C is < 7)   BP Readings from Last 3 Encounters:   11/04/24 (!) 158/79   05/31/24 130/88   01/10/24 130/84          (goal 120/80)  BUN   Date Value Ref Range Status   11/24/2023 8 6 - 20 mg/dL Final     Creatinine   Date Value Ref Range Status   11/24/2023 1.2 0.7 - 1.2 mg/dL Final     Potassium   Date Value Ref Range Status   11/24/2023 4.0 3.7 - 5.3 mmol/L Final

## 2025-03-17 DIAGNOSIS — N52.9 ERECTILE DYSFUNCTION, UNSPECIFIED ERECTILE DYSFUNCTION TYPE: ICD-10-CM

## 2025-03-17 RX ORDER — SILDENAFIL 100 MG/1
TABLET, FILM COATED ORAL
Qty: 60 TABLET | Refills: 0 | Status: SHIPPED | OUTPATIENT
Start: 2025-03-17

## 2025-03-17 NOTE — TELEPHONE ENCOUNTER
Please Approve or Refuse.  Send to Pharmacy per Pt's Request: [facility]      Next Visit Date:  4/16/2025   Last Visit Date: 12/20/2024    Hemoglobin A1C (%)   Date Value   09/15/2023 5.4   04/26/2023 5.5   01/23/2023 5.9             ( goal A1C is < 7)   BP Readings from Last 3 Encounters:   11/04/24 (!) 158/79   05/31/24 130/88   01/10/24 130/84          (goal 120/80)  BUN   Date Value Ref Range Status   11/24/2023 8 6 - 20 mg/dL Final     Creatinine   Date Value Ref Range Status   11/24/2023 1.2 0.7 - 1.2 mg/dL Final     Potassium   Date Value Ref Range Status   11/24/2023 4.0 3.7 - 5.3 mmol/L Final

## 2025-04-16 ENCOUNTER — OFFICE VISIT (OUTPATIENT)
Dept: FAMILY MEDICINE CLINIC | Age: 55
End: 2025-04-16
Payer: MEDICARE

## 2025-04-16 VITALS
BODY MASS INDEX: 37.25 KG/M2 | HEIGHT: 70 IN | SYSTOLIC BLOOD PRESSURE: 138 MMHG | WEIGHT: 260.2 LBS | HEART RATE: 92 BPM | OXYGEN SATURATION: 97 % | DIASTOLIC BLOOD PRESSURE: 100 MMHG

## 2025-04-16 DIAGNOSIS — N18.2 STAGE 2 CHRONIC KIDNEY DISEASE: ICD-10-CM

## 2025-04-16 DIAGNOSIS — E55.9 VITAMIN D DEFICIENCY: ICD-10-CM

## 2025-04-16 DIAGNOSIS — F31.9 BIPOLAR 1 DISORDER (HCC): ICD-10-CM

## 2025-04-16 DIAGNOSIS — Z11.59 ENCOUNTER FOR SCREENING FOR OTHER VIRAL DISEASES: ICD-10-CM

## 2025-04-16 DIAGNOSIS — Z00.00 MEDICARE ANNUAL WELLNESS VISIT, SUBSEQUENT: Primary | ICD-10-CM

## 2025-04-16 DIAGNOSIS — I10 ESSENTIAL HYPERTENSION: ICD-10-CM

## 2025-04-16 DIAGNOSIS — E66.01 CLASS 2 SEVERE OBESITY WITH SERIOUS COMORBIDITY AND BODY MASS INDEX (BMI) OF 37.0 TO 37.9 IN ADULT, UNSPECIFIED OBESITY TYPE: ICD-10-CM

## 2025-04-16 DIAGNOSIS — E66.812 CLASS 2 SEVERE OBESITY WITH SERIOUS COMORBIDITY AND BODY MASS INDEX (BMI) OF 37.0 TO 37.9 IN ADULT, UNSPECIFIED OBESITY TYPE: ICD-10-CM

## 2025-04-16 DIAGNOSIS — Z12.5 PROSTATE CANCER SCREENING: ICD-10-CM

## 2025-04-16 DIAGNOSIS — F31.75 BIPOLAR DISORDER, IN PARTIAL REMISSION, MOST RECENT EPISODE DEPRESSED (HCC): ICD-10-CM

## 2025-04-16 DIAGNOSIS — E11.9 TYPE 2 DIABETES MELLITUS WITHOUT COMPLICATION, WITHOUT LONG-TERM CURRENT USE OF INSULIN: ICD-10-CM

## 2025-04-16 PROBLEM — T78.2XXA ANAPHYLACTIC SYNDROME: Status: RESOLVED | Noted: 2024-01-08 | Resolved: 2025-04-16

## 2025-04-16 PROBLEM — S03.40XA SPRAIN OF TEMPOROMANDIBULAR JOINT OR LIGAMENT: Status: RESOLVED | Noted: 2022-05-25 | Resolved: 2025-04-16

## 2025-04-16 PROBLEM — R76.8 ANA POSITIVE: Status: RESOLVED | Noted: 2019-03-18 | Resolved: 2025-04-16

## 2025-04-16 PROBLEM — F10.20 ALCOHOL USE DISORDER, SEVERE, DEPENDENCE (HCC): Status: RESOLVED | Noted: 2023-07-21 | Resolved: 2025-04-16

## 2025-04-16 LAB — HBA1C MFR BLD: 6.5 %

## 2025-04-16 PROCEDURE — G8417 CALC BMI ABV UP PARAM F/U: HCPCS | Performed by: FAMILY MEDICINE

## 2025-04-16 PROCEDURE — G8427 DOCREV CUR MEDS BY ELIG CLIN: HCPCS | Performed by: FAMILY MEDICINE

## 2025-04-16 PROCEDURE — 3044F HG A1C LEVEL LT 7.0%: CPT | Performed by: FAMILY MEDICINE

## 2025-04-16 PROCEDURE — 83036 HEMOGLOBIN GLYCOSYLATED A1C: CPT | Performed by: FAMILY MEDICINE

## 2025-04-16 PROCEDURE — 3080F DIAST BP >= 90 MM HG: CPT | Performed by: FAMILY MEDICINE

## 2025-04-16 PROCEDURE — 3017F COLORECTAL CA SCREEN DOC REV: CPT | Performed by: FAMILY MEDICINE

## 2025-04-16 PROCEDURE — 99214 OFFICE O/P EST MOD 30 MIN: CPT | Performed by: FAMILY MEDICINE

## 2025-04-16 PROCEDURE — 2022F DILAT RTA XM EVC RTNOPTHY: CPT | Performed by: FAMILY MEDICINE

## 2025-04-16 PROCEDURE — G0439 PPPS, SUBSEQ VISIT: HCPCS | Performed by: FAMILY MEDICINE

## 2025-04-16 PROCEDURE — 1036F TOBACCO NON-USER: CPT | Performed by: FAMILY MEDICINE

## 2025-04-16 PROCEDURE — 3075F SYST BP GE 130 - 139MM HG: CPT | Performed by: FAMILY MEDICINE

## 2025-04-16 RX ORDER — AVOBENZONE, HOMOSALATE, OCTISALATE, OCTOCRYLENE 30; 40; 45; 26 MG/ML; MG/ML; MG/ML; MG/ML
CREAM TOPICAL
Qty: 100 EACH | Refills: 3 | Status: SHIPPED | OUTPATIENT
Start: 2025-04-16

## 2025-04-16 RX ORDER — GLUCOSAMINE HCL/CHONDROITIN SU 500-400 MG
CAPSULE ORAL
Qty: 100 STRIP | Refills: 3 | Status: SHIPPED | OUTPATIENT
Start: 2025-04-16

## 2025-04-16 SDOH — ECONOMIC STABILITY: FOOD INSECURITY: WITHIN THE PAST 12 MONTHS, THE FOOD YOU BOUGHT JUST DIDN'T LAST AND YOU DIDN'T HAVE MONEY TO GET MORE.: NEVER TRUE

## 2025-04-16 SDOH — ECONOMIC STABILITY: FOOD INSECURITY: WITHIN THE PAST 12 MONTHS, YOU WORRIED THAT YOUR FOOD WOULD RUN OUT BEFORE YOU GOT MONEY TO BUY MORE.: NEVER TRUE

## 2025-04-16 ASSESSMENT — PATIENT HEALTH QUESTIONNAIRE - PHQ9
SUM OF ALL RESPONSES TO PHQ QUESTIONS 1-9: 8
3. TROUBLE FALLING OR STAYING ASLEEP: NOT AT ALL
8. MOVING OR SPEAKING SO SLOWLY THAT OTHER PEOPLE COULD HAVE NOTICED. OR THE OPPOSITE, BEING SO FIGETY OR RESTLESS THAT YOU HAVE BEEN MOVING AROUND A LOT MORE THAN USUAL: NOT AT ALL
10. IF YOU CHECKED OFF ANY PROBLEMS, HOW DIFFICULT HAVE THESE PROBLEMS MADE IT FOR YOU TO DO YOUR WORK, TAKE CARE OF THINGS AT HOME, OR GET ALONG WITH OTHER PEOPLE: SOMEWHAT DIFFICULT
7. TROUBLE CONCENTRATING ON THINGS, SUCH AS READING THE NEWSPAPER OR WATCHING TELEVISION: NEARLY EVERY DAY
SUM OF ALL RESPONSES TO PHQ QUESTIONS 1-9: 8
6. FEELING BAD ABOUT YOURSELF - OR THAT YOU ARE A FAILURE OR HAVE LET YOURSELF OR YOUR FAMILY DOWN: SEVERAL DAYS
4. FEELING TIRED OR HAVING LITTLE ENERGY: SEVERAL DAYS
SUM OF ALL RESPONSES TO PHQ QUESTIONS 1-9: 8
5. POOR APPETITE OR OVEREATING: SEVERAL DAYS
1. LITTLE INTEREST OR PLEASURE IN DOING THINGS: SEVERAL DAYS
9. THOUGHTS THAT YOU WOULD BE BETTER OFF DEAD, OR OF HURTING YOURSELF: NOT AT ALL
2. FEELING DOWN, DEPRESSED OR HOPELESS: SEVERAL DAYS
SUM OF ALL RESPONSES TO PHQ QUESTIONS 1-9: 8

## 2025-04-16 ASSESSMENT — ENCOUNTER SYMPTOMS
TROUBLE SWALLOWING: 0
BLOOD IN STOOL: 0
BACK PAIN: 0
SHORTNESS OF BREATH: 0
EYE REDNESS: 0
RECTAL PAIN: 0
ABDOMINAL PAIN: 0
WHEEZING: 0
VOMITING: 0
ABDOMINAL DISTENTION: 0
SINUS PRESSURE: 0
COUGH: 0
COLOR CHANGE: 0
NAUSEA: 0
STRIDOR: 0
SORE THROAT: 0
RHINORRHEA: 0
CONSTIPATION: 0
CHEST TIGHTNESS: 0
DIARRHEA: 0

## 2025-04-16 ASSESSMENT — LIFESTYLE VARIABLES
HOW MANY STANDARD DRINKS CONTAINING ALCOHOL DO YOU HAVE ON A TYPICAL DAY: PATIENT DOES NOT DRINK
HOW OFTEN DO YOU HAVE A DRINK CONTAINING ALCOHOL: NEVER

## 2025-04-16 NOTE — PATIENT INSTRUCTIONS
[facility] Utility - Financial Resources*  (Call [employer]/211 if need more resources).       UTILITY ASSISTANCE:  [facility]  What the offer: Emergency Shelters, Overnight Shelters, Employment, Meals, Mental Health Support, Clothing, Utility Assistance, Rent Assistance  Address: [address] [place] [address]  Phone Number: [phone]) [phone]  Website:  [email or web address] [email or web address].[employer]    [facility]  What they offer: Meals, Clothing, Utility Assistance, Physical Health, Rent Assistance,   Emergency Shelters, Substance Abuse  Address: [address] [place], [place] [address] [place] [address]  Phone Number: (864) 673-3378  Website: https://[facility].org/[facility]/    [employer]  What they offer: Meals, Spiritual support, Utility Assistance, Transportation  Address: [address] [place] [address]  Phone Number: (515) 668-2990  Website: http://www.[facility].[employer]/Outreach  [facility]  What they offer: Family Shelters, Emergency Shelters, Clothing, Meals, Employment, Utility   Assistance, Rent Assistance, Physical Health  Address: [address] [place] [address]  Phone Number: (281) 141-5238  Website: http://[facility].org      [facility] Office on Aging of [facility] ([place] and surrounding [facility]):  What they offer: Assisted Living Waiver Program, Medicare benefits counseling, and referral to community resources.  Phone Number: 125.690.9631   [facility] ([place] and counties to the east):  What they offer: Assistance with utility expenses.  Phone Number: 808.634.7008   [facility] Service [facility]:  What they offer:  Assistance with rent or mortgage payments, utilities, auto payments or repair, food, medical prescriptions, transportation to VA medical facilities for veterans and their widows who qualify.  Phone Number: [phone]   [facility]

## 2025-04-16 NOTE — PROGRESS NOTES
Visit Information    Have you changed or started any medications since your last visit including any over-the-counter medicines, vitamins, or herbal medicines? no   Have you stopped taking any of your medications? Is so, why? -  no  Are you having any side effects from any of your medications? - no    Have you seen any other physician or provider since your last visit?  no   Have you had any other diagnostic tests since your last visit?  no   Have you been seen in the emergency room and/or had an admission in a hospital since we last saw you?  no   Have you had your routine dental cleaning in the past 6 months?  no     Do you have an active MyChart account? If no, what is the barrier?  Yes    Patient Care Team:  Dain Norwood MD as PCP - General (Family Medicine)  Dain Norwood MD as PCP - Empaneled Provider  Ankit Villatoro MD as Consulting Physician (Hematology and Oncology)  Denia Ricks APRN - CNP as Consulting Physician (Nurse Practitioner)    Medical History Review  Past Medical, Family, and Social History reviewed and does contribute to the patient presenting condition    Health Maintenance   Topic Date Due    Hepatitis B vaccine (1 of 3 - 19+ 3-dose series) Never done    Pneumococcal 50+ years Vaccine (1 of 1 - PCV) Never done    A1C test (Diabetic or Prediabetic)  09/15/2024    Annual Wellness Visit (Medicare Advantage)  01/01/2025    COVID-19 Vaccine (6 - 2024-25 season) 04/15/2026 (Originally 9/1/2024)    Depression Monitoring  05/31/2025    Flu vaccine (Season Ended) 08/01/2025    DTaP/Tdap/Td vaccine (2 - Td or Tdap) 05/29/2028    Lipids  09/15/2028    Colorectal Cancer Screen  04/24/2033    Shingles vaccine  Completed    Hepatitis C screen  Completed    HIV screen  Completed    Hepatitis A vaccine  Aged Out    Hib vaccine  Aged Out    Polio vaccine  Aged Out    Meningococcal (ACWY) vaccine  Aged Out    Meningococcal B vaccine  Aged Out    Diabetes screen  Discontinued              
Behavior: Behavior normal.         Thought Content: Thought content normal.              Allergies   Allergen Reactions    Latex Hives    Cat Dander Other (See Comments)     Wheezing       Lisinopril     Nsaids Itching     Tolerates baby asa    Pcn [Penicillins] Swelling     Swelling,hives, itching and shortness of breath    Shellfish-Derived Products     Shrimp Extract      Prior to Visit Medications    Medication Sig Taking? Authorizing Provider   sertraline (ZOLOFT) 50 MG tablet Take 1 tablet by mouth daily Yes Dain Norwood MD   SITagliptin (JANUVIA) 50 MG tablet Take 1 tablet by mouth daily Yes Dain Norwood MD   blood glucose monitor kit and supplies Dispense sufficient amount for indicated testing frequency plus additional to accommodate PRN testing needs. Dispense all needed supplies to include: monitor, strips, lancing device, lancets, control solutions, alcohol swabs. Yes Dain Norwood MD   Lancets MISC Testing once a day.  Please dispense according to patients insurance. Yes Dain Norwood MD   blood glucose monitor strips Testing once a day.  Please dispense according to patients insurance. Yes Dain Norwood MD   sildenafil (VIAGRA) 100 MG tablet TAKE 1 TABLET BY MOUTH EVERY DAY AS NEEDED FOR ERETILE DYSFUNCTION Yes Dain Norwood MD   tamsulosin (FLOMAX) 0.4 MG capsule TAKE 1 CAPSULE BY MOUTH EVERY DAY Yes Dain Norwood MD   carvedilol (COREG) 3.125 MG tablet Take 1 tablet by mouth 2 times daily Yes Dain Norwood MD   amLODIPine (NORVASC) 10 MG tablet TAKE 1 TABLET BY MOUTH EVERY DAY Yes Dain Norwood MD   Nebulizers (COMPRESSOR/NEBULIZER) MISC Nebulizer with compressor. Disposable Med Nebs 2 /month. Reusable Med Nebs 1 per 6 months.Aerosol Mask 1 per month. Replacement Filters 2 per month. All other related supplies as needed per month. Medications being used:Albuterol .083 unit dose vial. Frequency: every 6 hrs x 4/day .Length of Need: 1 Yes Dain Norwood MD   EPINEPHrine (EPIPEN

## 2025-04-18 ENCOUNTER — TELEPHONE (OUTPATIENT)
Dept: FAMILY MEDICINE CLINIC | Age: 55
End: 2025-04-18

## 2025-04-18 NOTE — TELEPHONE ENCOUNTER
INCOMING FAX CAME FROM PHARMACY , SEE ATTACHMENT BELOW STATED THAT PT DOES NOT HAVE A METER WILL NEED A RX FOR GLUCOMETER CALLED IN.    Please Approve or Refuse.  Send to Pharmacy per Pt's Request: [facility]      Next Visit Date:  7/16/2025   Last Visit Date: 4/16/2025    Hemoglobin A1C (%)   Date Value   04/16/2025 6.5   09/15/2023 5.4   04/26/2023 5.5             ( goal A1C is < 7)   BP Readings from Last 3 Encounters:   04/16/25 (!) 138/100   11/04/24 (!) 158/79   05/31/24 130/88          (goal 120/80)  BUN   Date Value Ref Range Status   11/24/2023 8 6 - 20 mg/dL Final     Creatinine   Date Value Ref Range Status   11/24/2023 1.2 0.7 - 1.2 mg/dL Final     Potassium   Date Value Ref Range Status   11/24/2023 4.0 3.7 - 5.3 mmol/L Final

## 2025-04-23 ENCOUNTER — TELEPHONE (OUTPATIENT)
Dept: FAMILY MEDICINE CLINIC | Age: 55
End: 2025-04-23

## 2025-04-23 DIAGNOSIS — E11.9 TYPE 2 DIABETES MELLITUS WITHOUT COMPLICATION, WITHOUT LONG-TERM CURRENT USE OF INSULIN (HCC): ICD-10-CM

## 2025-06-02 DIAGNOSIS — I10 ESSENTIAL HYPERTENSION: ICD-10-CM

## 2025-06-02 DIAGNOSIS — N52.9 ERECTILE DYSFUNCTION, UNSPECIFIED ERECTILE DYSFUNCTION TYPE: ICD-10-CM

## 2025-06-02 RX ORDER — SILDENAFIL 100 MG/1
TABLET, FILM COATED ORAL
Qty: 60 TABLET | Refills: 0 | Status: SHIPPED | OUTPATIENT
Start: 2025-06-02

## 2025-06-02 RX ORDER — AMLODIPINE BESYLATE 10 MG/1
10 TABLET ORAL DAILY
Qty: 90 TABLET | Refills: 0 | Status: SHIPPED | OUTPATIENT
Start: 2025-06-02

## 2025-06-28 DIAGNOSIS — R35.1 BENIGN PROSTATIC HYPERPLASIA WITH NOCTURIA: ICD-10-CM

## 2025-06-28 DIAGNOSIS — N40.1 BENIGN PROSTATIC HYPERPLASIA WITH NOCTURIA: ICD-10-CM

## 2025-06-29 NOTE — TELEPHONE ENCOUNTER
Please Approve or Refuse.  Send to Pharmacy per Pt's Request:      Next Visit Date:  7/16/2025   Last Visit Date: 4/16/2025    Hemoglobin A1C (%)   Date Value   04/16/2025 6.5   09/15/2023 5.4   04/26/2023 5.5             ( goal A1C is < 7)   BP Readings from Last 3 Encounters:   04/16/25 (!) 138/100   11/04/24 (!) 158/79   05/31/24 130/88          (goal 120/80)  BUN   Date Value Ref Range Status   11/24/2023 8 6 - 20 mg/dL Final     Creatinine   Date Value Ref Range Status   11/24/2023 1.2 0.7 - 1.2 mg/dL Final     Potassium   Date Value Ref Range Status   11/24/2023 4.0 3.7 - 5.3 mmol/L Final

## 2025-06-30 RX ORDER — TAMSULOSIN HYDROCHLORIDE 0.4 MG/1
CAPSULE ORAL DAILY
Qty: 90 CAPSULE | Refills: 0 | Status: SHIPPED | OUTPATIENT
Start: 2025-06-30

## (undated) DEVICE — ERBE NESSY® OMEGA PLATE USA (85+23)CM² , WITH CABLE 3 M: Brand: ERBE

## (undated) DEVICE — POLYP TRAP: Brand: TRAPEASE®

## (undated) DEVICE — SOLUTION IRRIG 1000ML STRL H2O USP PLAS POUR BTL

## (undated) DEVICE — GLOVE SURG SZ 7 CRM LTX FREE POLYISOPRENE POLYMER BEAD ANTI

## (undated) DEVICE — SNARE ENDOSCP L240CM LOOP W13MM DIA2.4MM SHT THROW SM OVL

## (undated) DEVICE — DEFENDO AIR WATER SUCTION AND BIOPSY VALVE KIT FOR  OLYMPUS: Brand: DEFENDO AIR/WATER/SUCTION AND BIOPSY VALVE

## (undated) DEVICE — CLIP LIG L235CM RESOL 360 BX/20

## (undated) DEVICE — ENDO KIT W/SYRINGE: Brand: MEDLINE INDUSTRIES, INC.

## (undated) DEVICE — FORCEPS BX L240CM WRK CHN 2.8MM STD CAP W/ NDL MIC MESH